# Patient Record
Sex: FEMALE | Race: WHITE | NOT HISPANIC OR LATINO | Employment: OTHER | URBAN - METROPOLITAN AREA
[De-identification: names, ages, dates, MRNs, and addresses within clinical notes are randomized per-mention and may not be internally consistent; named-entity substitution may affect disease eponyms.]

---

## 2017-01-04 ENCOUNTER — INFUSION (OUTPATIENT)
Dept: ONCOLOGY | Facility: HOSPITAL | Age: 82
End: 2017-01-04

## 2017-01-04 VITALS
SYSTOLIC BLOOD PRESSURE: 114 MMHG | WEIGHT: 144 LBS | RESPIRATION RATE: 16 BRPM | BODY MASS INDEX: 24.72 KG/M2 | DIASTOLIC BLOOD PRESSURE: 42 MMHG | HEART RATE: 79 BPM | TEMPERATURE: 98.1 F

## 2017-01-04 DIAGNOSIS — Z51.11 ENCOUNTER FOR ANTINEOPLASTIC CHEMOTHERAPY: ICD-10-CM

## 2017-01-04 PROCEDURE — 36591 DRAW BLOOD OFF VENOUS DEVICE: CPT

## 2017-01-04 PROCEDURE — G0463 HOSPITAL OUTPT CLINIC VISIT: HCPCS

## 2017-01-11 ENCOUNTER — INFUSION (OUTPATIENT)
Dept: ONCOLOGY | Facility: HOSPITAL | Age: 82
End: 2017-01-11

## 2017-01-11 VITALS
BODY MASS INDEX: 23.69 KG/M2 | SYSTOLIC BLOOD PRESSURE: 121 MMHG | WEIGHT: 138 LBS | HEART RATE: 75 BPM | TEMPERATURE: 98.9 F | DIASTOLIC BLOOD PRESSURE: 49 MMHG | RESPIRATION RATE: 16 BRPM

## 2017-01-11 DIAGNOSIS — C83.38 DIFFUSE LARGE B-CELL LYMPHOMA OF LYMPH NODES OF MULTIPLE REGIONS (HCC): ICD-10-CM

## 2017-01-11 DIAGNOSIS — Z51.11 ENCOUNTER FOR ANTINEOPLASTIC CHEMOTHERAPY: ICD-10-CM

## 2017-01-11 PROCEDURE — G0463 HOSPITAL OUTPT CLINIC VISIT: HCPCS

## 2017-01-18 ENCOUNTER — INFUSION (OUTPATIENT)
Dept: ONCOLOGY | Facility: HOSPITAL | Age: 82
End: 2017-01-18

## 2017-01-18 ENCOUNTER — OFFICE VISIT (OUTPATIENT)
Dept: ONCOLOGY | Facility: CLINIC | Age: 82
End: 2017-01-18

## 2017-01-18 VITALS
RESPIRATION RATE: 14 BRPM | WEIGHT: 137 LBS | SYSTOLIC BLOOD PRESSURE: 126 MMHG | BODY MASS INDEX: 23.39 KG/M2 | HEIGHT: 64 IN | TEMPERATURE: 98.2 F | HEART RATE: 88 BPM | DIASTOLIC BLOOD PRESSURE: 59 MMHG

## 2017-01-18 DIAGNOSIS — Z51.11 ENCOUNTER FOR ANTINEOPLASTIC CHEMOTHERAPY: Primary | ICD-10-CM

## 2017-01-18 DIAGNOSIS — Z51.11 ENCOUNTER FOR ANTINEOPLASTIC CHEMOTHERAPY: ICD-10-CM

## 2017-01-18 DIAGNOSIS — C83.38 DIFFUSE LARGE B-CELL LYMPHOMA OF LYMPH NODES OF MULTIPLE REGIONS (HCC): ICD-10-CM

## 2017-01-18 PROCEDURE — 36592 COLLECT BLOOD FROM PICC: CPT

## 2017-01-18 PROCEDURE — 99214 OFFICE O/P EST MOD 30 MIN: CPT | Performed by: INTERNAL MEDICINE

## 2017-01-18 RX ORDER — DIPHENHYDRAMINE HYDROCHLORIDE 50 MG/ML
50 INJECTION INTRAMUSCULAR; INTRAVENOUS AS NEEDED
Status: CANCELLED | OUTPATIENT
Start: 2017-02-09

## 2017-01-18 RX ORDER — PALONOSETRON 0.05 MG/ML
0.25 INJECTION, SOLUTION INTRAVENOUS ONCE
Status: CANCELLED | OUTPATIENT
Start: 2017-02-09

## 2017-01-18 RX ORDER — SODIUM CHLORIDE 9 MG/ML
250 INJECTION, SOLUTION INTRAVENOUS ONCE
Status: CANCELLED | OUTPATIENT
Start: 2017-02-09

## 2017-01-18 RX ORDER — SODIUM CHLORIDE 0.9 % (FLUSH) 0.9 %
10 SYRINGE (ML) INJECTION AS NEEDED
Status: DISCONTINUED | OUTPATIENT
Start: 2017-01-18 | End: 2017-01-18 | Stop reason: HOSPADM

## 2017-01-18 RX ORDER — MEPERIDINE HYDROCHLORIDE 50 MG/ML
25 INJECTION INTRAMUSCULAR; INTRAVENOUS; SUBCUTANEOUS
Status: CANCELLED | OUTPATIENT
Start: 2017-02-09

## 2017-01-18 RX ORDER — SODIUM CHLORIDE 0.9 % (FLUSH) 0.9 %
10 SYRINGE (ML) INJECTION AS NEEDED
Status: CANCELLED | OUTPATIENT
Start: 2017-01-18

## 2017-01-18 RX ORDER — FAMOTIDINE 10 MG/ML
20 INJECTION, SOLUTION INTRAVENOUS AS NEEDED
Status: CANCELLED | OUTPATIENT
Start: 2017-02-09

## 2017-01-18 RX ORDER — DOXORUBICIN HYDROCHLORIDE 2 MG/ML
25 INJECTION, SOLUTION INTRAVENOUS ONCE
Status: CANCELLED | OUTPATIENT
Start: 2017-02-09

## 2017-01-18 RX ORDER — ACETAMINOPHEN 325 MG/1
650 TABLET ORAL ONCE
Status: CANCELLED | OUTPATIENT
Start: 2017-02-09

## 2017-01-18 RX ADMIN — SODIUM CHLORIDE, PRESERVATIVE FREE 10 ML: 5 INJECTION INTRAVENOUS at 11:42

## 2017-01-18 NOTE — PROGRESS NOTES
"PROBLEM LIST:  1. 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 Initiated  CHOP plus Rituxan 10/27/2016  3. Ejection fraction normal prior to his initiating treatment         REASON FOR VISIT: Cycle#5    HISTORY OF PRESENT ILLNESS:   80-year-old lady with stage III diffuse large B cell lymphoma undergoing chemotherapy with CHOP plus Rituxan presents today for cycle #5 .  Doing well currently.  She has minimal nausea.  She does have fatigue midcycle.  She is otherwise is not having any significant issues of infections.  She cycles to diarrhea and constipation.  Otherwise no issues ongoing.    Past medical history, social history and family history was reviewed and unchanged from prior visit.    Review of Systems:    Review of Systems   Constitutional: Negative.    HENT:  Negative.    Eyes: Negative.    Respiratory: Negative.    Cardiovascular: Negative.    Gastrointestinal: Negative.    Endocrine: Negative.    Genitourinary: Negative.     Musculoskeletal: Negative.    Skin: Negative.    Neurological: Negative.    Hematological: Negative.    Psychiatric/Behavioral: Negative.       A comprehensive 14 point review of systems was performed and was negative except as mentioned.      Medications:  The current medication list was reviewed in the EMR    ALLERGIES:    Allergies   Allergen Reactions   • Latex Itching, Dermatitis and Rash   • Penicillins Rash         Physical Exam    VITAL SIGNS:  Visit Vitals   • /59   • Pulse 88   • Temp 98.2 °F (36.8 °C)   • Resp 14   • Ht 64\" (162.6 cm)   • Wt 137 lb (62.1 kg)   • BMI 23.52 kg/m2        Performance Status: 0    General: well appearing, in no acute distress  HEENT: sclera anicteric, oropharynx clear, neck is supple  Lymphatics: no cervical, supraclavicular, or axillary adenopathy  Cardiovascular: regular rate and rhythm, no murmurs, rubs or gallops  Lungs: clear to auscultation bilaterally  Abdomen: soft, nontender, nondistended.  No palpable organomegaly  Extremities: " no lower extremity edema  Skin: no rashes, lesions, bruising, or petechiae  Msk:  Shows no weakness of the large muscle groups  Psych: Mood is stable          Assessment/Plan    Stage III diffuse large B-cell lymphoma.  Near complete response after 2 cycles of chemotherapy.  Continue cycle #5 today with no dose reduction.  She is tolerating CHOP plus Rituxan with minimal side effects.  My plan is to complete 6 cycles after which her PICC line can be removed and plan for PET scan 1 month later.  Return to clinic in 3 weeks for last cycle of chemotherapy.    Mike Dale MD  [facility] Hematology and Oncology    1/18/2017         Please note that portions of this note may have been completed with a voice recognition program. Efforts were made to edit the dictations, but occasionally words are mistranscribed.

## 2017-01-18 NOTE — MR AVS SNAPSHOT
Katie Vaughn   1/18/2017 11:30 AM   Office Visit    Dept Phone:  [phone]   Encounter #:  [id number]    Provider:  Mike Dale MD   Department:  [facility] HEMATOLOGY  AND ONCOLOGY                Your Full Care Plan              Your Updated Medication List          This list is accurate as of: 1/18/17 11:55 AM.  Always use your most recent med list.                dicyclomine 10 MG capsule   Commonly known as:  BENTYL       diphenhydrAMINE 25 mg capsule   Commonly known as:  BENADRYL       doxycycline 100 MG tablet   Commonly known as:  VIBRAMYICN   Take 1 tablet by mouth 2 (Two) Times a Day. For 7 days       HYDROcodone-acetaminophen 5-325 MG per tablet   Commonly known as:  NORCO       levothyroxine 112 MCG tablet   Commonly known as:  SYNTHROID, LEVOTHROID       LORazepam 0.5 MG tablet   Commonly known as:  ATIVAN       metoprolol succinate XL 50 MG 24 hr tablet   Commonly known as:  TOPROL-XL       ondansetron 8 MG tablet   Commonly known as:  ZOFRAN   Take 1 tablet by mouth 3 (Three) Times a Day As Needed for nausea or vomiting.       prochlorperazine 10 MG tablet   Commonly known as:  COMPAZINE   Take 1 tablet by mouth Every 6 (Six) Hours As Needed for nausea or vomiting.       temazepam 15 MG capsule   Commonly known as:  RESTORIL   Take 1 capsule by mouth At Night As Needed for sleep. Pt may increase to 2 caps PO at night if not effective after 2 nights       vitamin B-12 1000 MCG tablet   Commonly known as:  CYANOCOBALAMIN               Instructions     None    Patient Instructions History      MyChart Signup     Our records indicate that you have declined [facility] signup. If you would like to sign up for [facility], please email [email or web address] or call [phone] to obtain an activation code.             Other Info from Your Visit           Your appointments     Date & Time Provider Appointment Department    Jan 19, 2017   "9:30 AM EST  JOSE LUIS FRKFT CHAIR 4 INFUSION [facility] OUTPATIENT ONCOLOGY AT [place]    Jan 25, 2017 11:30 AM EST  JOSE LUIS FRKFT CHAIR 3 DRESSING CHANGE - CENTRAL LINE [facility] OUTPATIENT ONCOLOGY AT [place]    Feb 01, 2017 11:30 AM EST  JOSE LUIS FRKFT CHAIR 2 DRESSING CHANGE - CENTRAL LINE [facility] OUTPATIENT ONCOLOGY AT [place]    Feb 08, 2017 11:45 AM EST Mike Dale MD FOLLOW UP [facility] HEMATOLOGY  AND ONCOLOGY    Feb 08, 2017 12:30 PM EST  JOSE LUIS FRKFT CHAIR 6 DRESSING CHANGE - CENTRAL LINE [facility] OUTPATIENT ONCOLOGY AT [place]    Feb 09, 2017  9:30 AM EST  JOSE LUIS FRKFT CHAIR 4 INFUSION [facility] OUTPATIENT ONCOLOGY AT [facility] OUTPATIENT ONCOLOGY AT [facility]  [address] [place] [id number]  [phone] [facility] HEMATOLOGY  AND ONCOLOGY  [address] [place] [id number]  [phone]          Vital Signs     Blood Pressure Pulse Temperature Respirations Height Weight    126/59 88 98.2 °F (36.8 °C) 14 64\" (162.6 cm) 137 lb (62.1 kg)    Body Mass Index Smoking Status                23.52 kg/m2 Former Smoker          Problems and Diagnoses Noted     Diffuse large B-cell lymphoma of lymph nodes of multiple regions    Patient on antineoplastic chemotherapy regimen        "

## 2017-01-19 ENCOUNTER — INFUSION (OUTPATIENT)
Dept: ONCOLOGY | Facility: HOSPITAL | Age: 82
End: 2017-01-19

## 2017-01-19 VITALS — SYSTOLIC BLOOD PRESSURE: 98 MMHG | DIASTOLIC BLOOD PRESSURE: 44 MMHG | HEART RATE: 74 BPM

## 2017-01-19 DIAGNOSIS — Z51.11 ENCOUNTER FOR ANTINEOPLASTIC CHEMOTHERAPY: Primary | ICD-10-CM

## 2017-01-19 DIAGNOSIS — C83.38 DIFFUSE LARGE B-CELL LYMPHOMA OF LYMPH NODES OF MULTIPLE REGIONS (HCC): ICD-10-CM

## 2017-01-19 PROCEDURE — 25010000002 DEXAMETHASONE PER 1 MG: Performed by: INTERNAL MEDICINE

## 2017-01-19 PROCEDURE — 25010000002 DIPHENHYDRAMINE PER 50 MG: Performed by: INTERNAL MEDICINE

## 2017-01-19 PROCEDURE — 96376 TX/PRO/DX INJ SAME DRUG ADON: CPT

## 2017-01-19 PROCEDURE — 25010000002 DOXORUBICIN PER 10 MG: Performed by: INTERNAL MEDICINE

## 2017-01-19 PROCEDURE — 96413 CHEMO IV INFUSION 1 HR: CPT

## 2017-01-19 PROCEDURE — 25010000002 VINCRISTINE PER 1 MG: Performed by: INTERNAL MEDICINE

## 2017-01-19 PROCEDURE — 96415 CHEMO IV INFUSION ADDL HR: CPT

## 2017-01-19 PROCEDURE — 96417 CHEMO IV INFUS EACH ADDL SEQ: CPT

## 2017-01-19 PROCEDURE — 25010000002 PEGFILGRASTIM 6 MG/0.6ML PREFILLED SYRINGE KIT: Performed by: INTERNAL MEDICINE

## 2017-01-19 PROCEDURE — 25010000002 PALONOSETRON PER 25 MCG: Performed by: INTERNAL MEDICINE

## 2017-01-19 PROCEDURE — 96375 TX/PRO/DX INJ NEW DRUG ADDON: CPT

## 2017-01-19 PROCEDURE — 25010000002 FOSAPREPITANT PER 1 MG: Performed by: INTERNAL MEDICINE

## 2017-01-19 PROCEDURE — 25010000002 CYCLOPHOSPHAMIDE PER 100 MG: Performed by: INTERNAL MEDICINE

## 2017-01-19 PROCEDURE — 25010000002 RITUXIMAB 10 MG/ML SOLUTION 50 ML VIAL: Performed by: INTERNAL MEDICINE

## 2017-01-19 PROCEDURE — 96411 CHEMO IV PUSH ADDL DRUG: CPT

## 2017-01-19 PROCEDURE — 96372 THER/PROPH/DIAG INJ SC/IM: CPT

## 2017-01-19 PROCEDURE — 96377 APPLICATON ON-BODY INJECTOR: CPT

## 2017-01-19 PROCEDURE — 25010000002 RITUXIMAB 10 MG/ML SOLUTION 10 ML VIAL: Performed by: INTERNAL MEDICINE

## 2017-01-19 PROCEDURE — 96367 TX/PROPH/DG ADDL SEQ IV INF: CPT

## 2017-01-19 RX ORDER — SODIUM CHLORIDE 0.9 % (FLUSH) 0.9 %
10 SYRINGE (ML) INJECTION AS NEEDED
Status: DISCONTINUED | OUTPATIENT
Start: 2017-01-19 | End: 2017-01-19 | Stop reason: HOSPADM

## 2017-01-19 RX ORDER — SODIUM CHLORIDE 0.9 % (FLUSH) 0.9 %
10 SYRINGE (ML) INJECTION AS NEEDED
Status: CANCELLED | OUTPATIENT
Start: 2017-01-19

## 2017-01-19 RX ORDER — ACETAMINOPHEN 325 MG/1
650 TABLET ORAL ONCE
Status: COMPLETED | OUTPATIENT
Start: 2017-01-19 | End: 2017-01-19

## 2017-01-19 RX ORDER — DOXORUBICIN HYDROCHLORIDE 2 MG/ML
25 INJECTION, SOLUTION INTRAVENOUS ONCE
Status: COMPLETED | OUTPATIENT
Start: 2017-01-19 | End: 2017-01-19

## 2017-01-19 RX ORDER — SODIUM CHLORIDE 9 MG/ML
250 INJECTION, SOLUTION INTRAVENOUS ONCE
Status: COMPLETED | OUTPATIENT
Start: 2017-01-19 | End: 2017-01-19

## 2017-01-19 RX ORDER — PALONOSETRON 0.05 MG/ML
0.25 INJECTION, SOLUTION INTRAVENOUS ONCE
Status: COMPLETED | OUTPATIENT
Start: 2017-01-19 | End: 2017-01-19

## 2017-01-19 RX ADMIN — PEGFILGRASTIM 6 MG: KIT SUBCUTANEOUS at 13:24

## 2017-01-19 RX ADMIN — SODIUM CHLORIDE, PRESERVATIVE FREE 10 ML: 5 INJECTION INTRAVENOUS at 13:16

## 2017-01-19 RX ADMIN — SODIUM CHLORIDE 250 ML: 9 INJECTION, SOLUTION INTRAVENOUS at 09:18

## 2017-01-19 RX ADMIN — VINCRISTINE SULFATE 1 MG: 1 INJECTION, SOLUTION INTRAVENOUS at 12:32

## 2017-01-19 RX ADMIN — RITUXIMAB 630 MG: 10 INJECTION, SOLUTION INTRAVENOUS at 10:08

## 2017-01-19 RX ADMIN — CYCLOPHOSPHAMIDE 670 MG: 1 INJECTION, POWDER, FOR SOLUTION INTRAVENOUS; ORAL at 12:47

## 2017-01-19 RX ADMIN — PALONOSETRON HYDROCHLORIDE 0.25 MG: 0.25 INJECTION INTRAVENOUS at 09:22

## 2017-01-19 RX ADMIN — DIPHENHYDRAMINE HYDROCHLORIDE 50 MG: 50 INJECTION INTRAMUSCULAR; INTRAVENOUS at 09:23

## 2017-01-19 RX ADMIN — SODIUM CHLORIDE 150 MG: 9 INJECTION, SOLUTION INTRAVENOUS at 09:45

## 2017-01-19 RX ADMIN — DOXORUBICIN HYDROCHLORIDE 42 MG: 2 INJECTION, SOLUTION INTRAVENOUS at 12:24

## 2017-01-19 RX ADMIN — DEXAMETHASONE SODIUM PHOSPHATE 12 MG: 4 INJECTION, SOLUTION INTRA-ARTICULAR; INTRALESIONAL; INTRAMUSCULAR; INTRAVENOUS; SOFT TISSUE at 09:47

## 2017-01-19 RX ADMIN — ACETAMINOPHEN 650 MG: 325 TABLET ORAL at 09:19

## 2017-01-25 ENCOUNTER — INFUSION (OUTPATIENT)
Dept: ONCOLOGY | Facility: HOSPITAL | Age: 82
End: 2017-01-25

## 2017-01-25 DIAGNOSIS — C83.38 DIFFUSE LARGE B-CELL LYMPHOMA OF LYMPH NODES OF MULTIPLE REGIONS (HCC): Primary | ICD-10-CM

## 2017-01-25 PROCEDURE — 36591 DRAW BLOOD OFF VENOUS DEVICE: CPT

## 2017-01-25 PROCEDURE — 25010000002 HEPARIN FLUSH (PORCINE) 100 UNIT/ML SOLUTION: Performed by: INTERNAL MEDICINE

## 2017-01-25 PROCEDURE — G0463 HOSPITAL OUTPT CLINIC VISIT: HCPCS

## 2017-01-25 RX ORDER — SODIUM CHLORIDE 0.9 % (FLUSH) 0.9 %
10 SYRINGE (ML) INJECTION AS NEEDED
Status: CANCELLED | OUTPATIENT
Start: 2017-01-25

## 2017-01-25 RX ADMIN — HEPARIN 500 UNITS: 100 SYRINGE at 11:56

## 2017-02-01 ENCOUNTER — INFUSION (OUTPATIENT)
Dept: ONCOLOGY | Facility: HOSPITAL | Age: 82
End: 2017-02-01

## 2017-02-01 VITALS
WEIGHT: 137.2 LBS | BODY MASS INDEX: 23.55 KG/M2 | SYSTOLIC BLOOD PRESSURE: 123 MMHG | DIASTOLIC BLOOD PRESSURE: 50 MMHG | HEART RATE: 87 BPM | TEMPERATURE: 98.6 F

## 2017-02-01 DIAGNOSIS — Z51.11 ENCOUNTER FOR ANTINEOPLASTIC CHEMOTHERAPY: ICD-10-CM

## 2017-02-01 DIAGNOSIS — C83.38 DIFFUSE LARGE B-CELL LYMPHOMA OF LYMPH NODES OF MULTIPLE REGIONS (HCC): Primary | ICD-10-CM

## 2017-02-01 PROCEDURE — 25010000002 HEPARIN FLUSH (PORCINE) 100 UNIT/ML SOLUTION: Performed by: INTERNAL MEDICINE

## 2017-02-01 PROCEDURE — 96523 IRRIG DRUG DELIVERY DEVICE: CPT

## 2017-02-01 PROCEDURE — G0463 HOSPITAL OUTPT CLINIC VISIT: HCPCS

## 2017-02-01 RX ORDER — SODIUM CHLORIDE 0.9 % (FLUSH) 0.9 %
10 SYRINGE (ML) INJECTION AS NEEDED
Status: CANCELLED | OUTPATIENT
Start: 2017-02-01

## 2017-02-01 RX ORDER — SODIUM CHLORIDE 0.9 % (FLUSH) 0.9 %
10 SYRINGE (ML) INJECTION AS NEEDED
Status: DISCONTINUED | OUTPATIENT
Start: 2017-02-01 | End: 2017-02-01 | Stop reason: HOSPADM

## 2017-02-01 RX ADMIN — HEPARIN 500 UNITS: 100 SYRINGE at 11:40

## 2017-02-01 RX ADMIN — Medication 10 ML: at 11:40

## 2017-02-08 ENCOUNTER — INFUSION (OUTPATIENT)
Dept: ONCOLOGY | Facility: HOSPITAL | Age: 82
End: 2017-02-08

## 2017-02-08 ENCOUNTER — OFFICE VISIT (OUTPATIENT)
Dept: ONCOLOGY | Facility: CLINIC | Age: 82
End: 2017-02-08

## 2017-02-08 VITALS
SYSTOLIC BLOOD PRESSURE: 120 MMHG | RESPIRATION RATE: 16 BRPM | WEIGHT: 136 LBS | HEIGHT: 64 IN | HEART RATE: 79 BPM | TEMPERATURE: 98.4 F | DIASTOLIC BLOOD PRESSURE: 63 MMHG | BODY MASS INDEX: 23.22 KG/M2

## 2017-02-08 DIAGNOSIS — Z51.11 ENCOUNTER FOR ANTINEOPLASTIC CHEMOTHERAPY: ICD-10-CM

## 2017-02-08 DIAGNOSIS — C83.38 DIFFUSE LARGE B-CELL LYMPHOMA OF LYMPH NODES OF MULTIPLE REGIONS (HCC): Primary | ICD-10-CM

## 2017-02-08 PROCEDURE — 99214 OFFICE O/P EST MOD 30 MIN: CPT | Performed by: INTERNAL MEDICINE

## 2017-02-08 PROCEDURE — 36591 DRAW BLOOD OFF VENOUS DEVICE: CPT

## 2017-02-08 PROCEDURE — 25010000002 HEPARIN FLUSH (PORCINE) 100 UNIT/ML SOLUTION: Performed by: INTERNAL MEDICINE

## 2017-02-08 PROCEDURE — 36592 COLLECT BLOOD FROM PICC: CPT

## 2017-02-08 RX ORDER — SODIUM CHLORIDE 0.9 % (FLUSH) 0.9 %
10 SYRINGE (ML) INJECTION AS NEEDED
OUTPATIENT
Start: 2017-02-08

## 2017-02-08 RX ORDER — SODIUM CHLORIDE 0.9 % (FLUSH) 0.9 %
10 SYRINGE (ML) INJECTION AS NEEDED
Status: DISCONTINUED | OUTPATIENT
Start: 2017-02-08 | End: 2017-02-08 | Stop reason: HOSPADM

## 2017-02-08 RX ORDER — HEPARIN SODIUM (PORCINE) LOCK FLUSH IV SOLN 100 UNIT/ML 100 UNIT/ML
500 SOLUTION INTRAVENOUS AS NEEDED
OUTPATIENT
Start: 2017-02-08

## 2017-02-08 RX ADMIN — Medication 10 ML: at 12:00

## 2017-02-08 RX ADMIN — HEPARIN 500 UNITS: 100 SYRINGE at 12:00

## 2017-02-08 NOTE — PROGRESS NOTES
"PROBLEM LIST:  1. 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 Initiated  CHOP plus Rituxan 10/27/2016  3. Ejection fraction normal prior to his initiating treatment  4. Completed 6 cycles of CHOP-R on 2/9/17         REASON FOR VISIT: Cycle#5    HISTORY OF PRESENT ILLNESS:   80-year-old lady with stage III diffuse large B cell lymphoma undergoing chemotherapy with CHOP plus Rituxan presents today for cycle #6 .  Doing well currently.  She has minimal nausea.   She is otherwise is not having any significant issues of infections.    Otherwise no issues ongoing.    Past medical history, social history and family history was reviewed and unchanged from prior visit.    Review of Systems:    Review of Systems   Constitutional: Negative.    HENT:  Negative.    Eyes: Negative.    Respiratory: Negative.    Cardiovascular: Negative.    Gastrointestinal: Negative.    Endocrine: Negative.    Genitourinary: Negative.     Musculoskeletal: Negative.    Skin: Negative.    Neurological: Negative.    Hematological: Negative.    Psychiatric/Behavioral: Negative.       A comprehensive 14 point review of systems was performed and was negative except as mentioned.      Medications:  The current medication list was reviewed in the EMR    ALLERGIES:    Allergies   Allergen Reactions   • Latex Itching, Dermatitis and Rash   • Penicillins Rash         Physical Exam    VITAL SIGNS:  Visit Vitals   • /63   • Pulse 79   • Temp 98.4 °F (36.9 °C)   • Resp 16   • Ht 64\" (162.6 cm)   • Wt 136 lb (61.7 kg)   • BMI 23.34 kg/m2        Performance Status: 0    General: well appearing, in no acute distress  HEENT: sclera anicteric, oropharynx clear, neck is supple  Lymphatics: no cervical, supraclavicular, or axillary adenopathy  Cardiovascular: regular rate and rhythm, no murmurs, rubs or gallops  Lungs: clear to auscultation bilaterally  Abdomen: soft, nontender, nondistended.  No palpable organomegaly  Extremities: no lower extremity " edema  Skin: no rashes, lesions, bruising, or petechiae  Msk:  Shows no weakness of the large muscle groups  Psych: Mood is stable          Assessment/Plan    Stage III diffuse large B-cell lymphoma.  Near complete response after 2 cycles of chemotherapy.  Continue cycle #6 today with no dose reduction.  This will be her last cycle of chemotherapy.  I will have her PICC line removed after this treatment.  Plan to PET scan her in 1 month to see disease response.  Due to her age she would not be a candidate for salvage autologous stem cell transplant if necessary.  So I'm hopeful that this treatment has cured her.  We'll see her back in my clinic in 1 month and prior to that PET scan.      Mike Dale MD  [facility] Hematology and Oncology    2/8/2017         Please note that portions of this note may have been completed with a voice recognition program. Efforts were made to edit the dictations, but occasionally words are mistranscribed.

## 2017-02-09 ENCOUNTER — INFUSION (OUTPATIENT)
Dept: ONCOLOGY | Facility: HOSPITAL | Age: 82
End: 2017-02-09

## 2017-02-09 VITALS
WEIGHT: 136.8 LBS | BODY MASS INDEX: 23.48 KG/M2 | DIASTOLIC BLOOD PRESSURE: 46 MMHG | TEMPERATURE: 98.6 F | HEART RATE: 75 BPM | RESPIRATION RATE: 14 BRPM | SYSTOLIC BLOOD PRESSURE: 100 MMHG

## 2017-02-09 DIAGNOSIS — Z51.11 ENCOUNTER FOR ANTINEOPLASTIC CHEMOTHERAPY: Primary | ICD-10-CM

## 2017-02-09 DIAGNOSIS — C83.38 DIFFUSE LARGE B-CELL LYMPHOMA OF LYMPH NODES OF MULTIPLE REGIONS (HCC): ICD-10-CM

## 2017-02-09 PROCEDURE — 25010000002 RITUXIMAB 10 MG/ML SOLUTION 50 ML VIAL: Performed by: INTERNAL MEDICINE

## 2017-02-09 PROCEDURE — 96417 CHEMO IV INFUS EACH ADDL SEQ: CPT

## 2017-02-09 PROCEDURE — 96413 CHEMO IV INFUSION 1 HR: CPT

## 2017-02-09 PROCEDURE — 25010000002 PEGFILGRASTIM 6 MG/0.6ML PREFILLED SYRINGE KIT: Performed by: INTERNAL MEDICINE

## 2017-02-09 PROCEDURE — 25010000002 VINCRISTINE PER 1 MG: Performed by: INTERNAL MEDICINE

## 2017-02-09 PROCEDURE — 25010000002 DEXAMETHASONE PER 1 MG: Performed by: INTERNAL MEDICINE

## 2017-02-09 PROCEDURE — 96377 APPLICATON ON-BODY INJECTOR: CPT

## 2017-02-09 PROCEDURE — 25010000002 CYCLOPHOSPHAMIDE PER 100 MG: Performed by: INTERNAL MEDICINE

## 2017-02-09 PROCEDURE — 25010000002 RITUXIMAB 10 MG/ML SOLUTION 10 ML VIAL: Performed by: INTERNAL MEDICINE

## 2017-02-09 PROCEDURE — 25010000002 PALONOSETRON PER 25 MCG: Performed by: INTERNAL MEDICINE

## 2017-02-09 PROCEDURE — 96415 CHEMO IV INFUSION ADDL HR: CPT

## 2017-02-09 PROCEDURE — 96375 TX/PRO/DX INJ NEW DRUG ADDON: CPT

## 2017-02-09 PROCEDURE — 25010000002 DOXORUBICIN PER 10 MG: Performed by: INTERNAL MEDICINE

## 2017-02-09 PROCEDURE — 25010000002 FOSAPREPITANT PER 1 MG: Performed by: INTERNAL MEDICINE

## 2017-02-09 PROCEDURE — 96367 TX/PROPH/DG ADDL SEQ IV INF: CPT

## 2017-02-09 PROCEDURE — 25010000002 DIPHENHYDRAMINE PER 50 MG: Performed by: INTERNAL MEDICINE

## 2017-02-09 PROCEDURE — 96411 CHEMO IV PUSH ADDL DRUG: CPT

## 2017-02-09 RX ORDER — SODIUM CHLORIDE 9 MG/ML
250 INJECTION, SOLUTION INTRAVENOUS ONCE
Status: COMPLETED | OUTPATIENT
Start: 2017-02-09 | End: 2017-02-09

## 2017-02-09 RX ORDER — PALONOSETRON 0.05 MG/ML
0.25 INJECTION, SOLUTION INTRAVENOUS ONCE
Status: COMPLETED | OUTPATIENT
Start: 2017-02-09 | End: 2017-02-09

## 2017-02-09 RX ORDER — DOXORUBICIN HYDROCHLORIDE 2 MG/ML
25 INJECTION, SOLUTION INTRAVENOUS ONCE
Status: COMPLETED | OUTPATIENT
Start: 2017-02-09 | End: 2017-02-09

## 2017-02-09 RX ORDER — ACETAMINOPHEN 325 MG/1
650 TABLET ORAL ONCE
Status: COMPLETED | OUTPATIENT
Start: 2017-02-09 | End: 2017-02-09

## 2017-02-09 RX ADMIN — DIPHENHYDRAMINE HYDROCHLORIDE 50 MG: 50 INJECTION INTRAMUSCULAR; INTRAVENOUS at 09:37

## 2017-02-09 RX ADMIN — ACETAMINOPHEN 650 MG: 325 TABLET ORAL at 09:32

## 2017-02-09 RX ADMIN — DEXAMETHASONE SODIUM PHOSPHATE 12 MG: 4 INJECTION, SOLUTION INTRA-ARTICULAR; INTRALESIONAL; INTRAMUSCULAR; INTRAVENOUS; SOFT TISSUE at 09:53

## 2017-02-09 RX ADMIN — SODIUM CHLORIDE 150 MG: 9 INJECTION, SOLUTION INTRAVENOUS at 09:53

## 2017-02-09 RX ADMIN — PALONOSETRON HYDROCHLORIDE 0.25 MG: 0.25 INJECTION INTRAVENOUS at 09:34

## 2017-02-09 RX ADMIN — PEGFILGRASTIM 6 MG: KIT SUBCUTANEOUS at 13:45

## 2017-02-09 RX ADMIN — VINCRISTINE SULFATE 1 MG: 1 INJECTION, SOLUTION INTRAVENOUS at 12:48

## 2017-02-09 RX ADMIN — RITUXIMAB 630 MG: 10 INJECTION, SOLUTION INTRAVENOUS at 10:15

## 2017-02-09 RX ADMIN — CYCLOPHOSPHAMIDE 670 MG: 1 INJECTION, POWDER, FOR SOLUTION INTRAVENOUS; ORAL at 13:01

## 2017-02-09 RX ADMIN — DOXORUBICIN HYDROCHLORIDE 42 MG: 2 INJECTION, SOLUTION INTRAVENOUS at 12:42

## 2017-02-09 RX ADMIN — SODIUM CHLORIDE 250 ML: 9 INJECTION, SOLUTION INTRAVENOUS at 09:31

## 2017-02-17 ENCOUNTER — TELEPHONE (OUTPATIENT)
Dept: ONCOLOGY | Facility: CLINIC | Age: 82
End: 2017-02-17

## 2017-02-17 DIAGNOSIS — C83.38 DIFFUSE LARGE B-CELL LYMPHOMA OF LYMPH NODES OF MULTIPLE REGIONS (HCC): Primary | ICD-10-CM

## 2017-02-17 DIAGNOSIS — Z51.11 ENCOUNTER FOR ANTINEOPLASTIC CHEMOTHERAPY: ICD-10-CM

## 2017-02-17 RX ORDER — FLUCONAZOLE 200 MG/1
200 TABLET ORAL DAILY
Qty: 3 TABLET | Refills: 1 | Status: SHIPPED | OUTPATIENT
Start: 2017-02-17 | End: 2019-04-22 | Stop reason: HOSPADM

## 2017-02-17 NOTE — TELEPHONE ENCOUNTER
----- Message from Alix Salvador sent at 2/17/2017 10:50 AM EST -----  Regarding: kumar/ rash pelvic area  Contact: [phone]  Rash in pelvic area.  Had last chemo.  Please call to discuss.

## 2017-02-20 ENCOUNTER — TELEPHONE (OUTPATIENT)
Dept: ONCOLOGY | Facility: CLINIC | Age: 82
End: 2017-02-20

## 2017-02-20 DIAGNOSIS — Z51.11 ENCOUNTER FOR ANTINEOPLASTIC CHEMOTHERAPY: ICD-10-CM

## 2017-02-20 DIAGNOSIS — C83.38 DIFFUSE LARGE B-CELL LYMPHOMA OF LYMPH NODES OF MULTIPLE REGIONS (HCC): Primary | ICD-10-CM

## 2017-02-20 RX ORDER — NYSTATIN 100000 [USP'U]/G
POWDER TOPICAL 3 TIMES DAILY
Qty: 30 G | Refills: 3 | Status: SHIPPED | OUTPATIENT
Start: 2017-02-20 | End: 2019-04-22 | Stop reason: HOSPADM

## 2017-02-20 NOTE — TELEPHONE ENCOUNTER
----- Message from Fiorella Milton sent at 2/20/2017  9:04 AM EST -----  Regarding: EMANUEL-RASH  Contact: [phone]  Patient still has the rash after finishing medication and it is still spreading please advise.

## 2017-02-21 ENCOUNTER — TELEPHONE (OUTPATIENT)
Dept: NUTRITION | Facility: HOSPITAL | Age: 82
End: 2017-02-21

## 2017-02-21 NOTE — PROGRESS NOTES
Oncology Nutrition Screening    Patient Name:  Katie Vaughn  YOB: 1935  MRN: [id number]  Date:  02/21/17  Physician:  Dr. Pal    Type of Cancer Treatment:   Chemotherapy:  R-CHOP 6/6 cycles completed 2/8/17    Patient Active Problem List   Diagnosis   • Diffuse large B-cell lymphoma of lymph nodes of multiple regions   • Encounter for antineoplastic chemotherapy       Current Outpatient Prescriptions   Medication Sig Dispense Refill   • dicyclomine (BENTYL) 10 MG capsule Take 10 mg by mouth As Needed.     • diphenhydrAMINE (BENADRYL) 25 mg capsule Take 25 mg by mouth Every 6 (Six) Hours As Needed for itching.     • doxycycline (VIBRAMYICN) 100 MG tablet Take 1 tablet by mouth 2 (Two) Times a Day. For 7 days 7 tablet 0   • fluconazole (DIFLUCAN) 200 MG tablet Take 1 tablet by mouth Daily. For 3 days 3 tablet 1   • HYDROcodone-acetaminophen (NORCO) 5-325 MG per tablet      • levothyroxine (SYNTHROID, LEVOTHROID) 112 MCG tablet Take 112 mcg by mouth Daily.     • LORazepam (ATIVAN) 0.5 MG tablet Take 0.5 mg by mouth Daily.     • metoprolol succinate XL (TOPROL-XL) 50 MG 24 hr tablet      • nystatin (MYCOSTATIN) 309206 UNIT/GM powder Apply  topically 3 (Three) Times a Day. 30 g 3   • ondansetron (ZOFRAN) 8 MG tablet Take 1 tablet by mouth 3 (Three) Times a Day As Needed for nausea or vomiting. 30 tablet 5   • prochlorperazine (COMPAZINE) 10 MG tablet Take 1 tablet by mouth Every 6 (Six) Hours As Needed for nausea or vomiting. 90 tablet 5   • temazepam (RESTORIL) 15 MG capsule Take 1 capsule by mouth At Night As Needed for sleep. Pt may increase to 2 caps PO at night if not effective after 2 nights 60 capsule 5   • vitamin B-12 (CYANOCOBALAMIN) 1000 MCG tablet Inject 1,000 mcg as directed Every 30 (Thirty) Days.       No current facility-administered medications for this visit.        Glycemic Risk:   Steroids with treatment     Weight:   Height: 64 inches  Weight: 136 lbs.  Usual Body Weight: ~141 lbs.    BMI: 23.3  Normal  Weight -no significant changes    Oral Food Intake:  Regular Diet - No Restrictions    Hydration Status:   How many 8 ounce glass of water of fluid do you drink per day?  Not assessed    Enteral Feeding:   n/a    Nutrition Symptoms:   Nausea  Fatigue    Activity:   Normal with no limitations     reports that she has quit smoking. She does not have any smokeless tobacco history on file.    Evaluation of Nutritional Risk:   Patient has been identified at mild nutritional risk due to diagnosis and treatment plan.  Spoke with patient via phone call for nutritional assessment.  She reports tolerating her chemotherapy well with her only nutritional complaint being mild nausea.  She does state that her appetite has been slightly decreased but she ate throughout treatment because she knew she needed the nourishment.    She denies nutritional questions at this time and denied the need for written diet materials for cancer survivors.  Encouraged her to continue with her good oral intake.  Also encouraged her to call RD if nutritional questions or concerns arise; she stated she would.  Will follow up as needed.  RD available to assist prn.  Thanks,     Electronically signed by:  Aleisha Dodd RD  1:19 PM

## 2017-03-02 ENCOUNTER — TELEPHONE (OUTPATIENT)
Dept: ONCOLOGY | Facility: CLINIC | Age: 82
End: 2017-03-02

## 2017-03-02 DIAGNOSIS — C83.38 DIFFUSE LARGE B-CELL LYMPHOMA OF LYMPH NODES OF MULTIPLE REGIONS (HCC): Primary | ICD-10-CM

## 2017-03-02 NOTE — TELEPHONE ENCOUNTER
Patient called and reported that she had been to see her PCP, and was given simethacone for gas but hasn't taken medication yet, and feels that her abdomen swelling was related to gas.  Educated to take medication for gas and if no relief to call back on Monday if needed can try to move up PET scan earlier.  Pt was concerned about moving PET because her Son was teaching a class next week and had taken off on Thursday and didn't know that he could take off another day and he was the only one the was able to drive her to appointment.  Pt to call back on Monday is abd swelling continues and would like to move PET up sooner.

## 2017-03-09 ENCOUNTER — HOSPITAL ENCOUNTER (OUTPATIENT)
Dept: PET IMAGING | Facility: HOSPITAL | Age: 82
Discharge: HOME | End: 2017-03-09
Attending: INTERNAL MEDICINE

## 2017-03-09 ENCOUNTER — OFFICE VISIT (OUTPATIENT)
Dept: ONCOLOGY | Facility: CLINIC | Age: 82
End: 2017-03-09

## 2017-03-09 ENCOUNTER — HOSPITAL ENCOUNTER (OUTPATIENT)
Dept: PET IMAGING | Facility: HOSPITAL | Age: 82
Discharge: HOME | End: 2017-03-09
Attending: INTERNAL MEDICINE | Admitting: INTERNAL MEDICINE

## 2017-03-09 VITALS
SYSTOLIC BLOOD PRESSURE: 146 MMHG | HEART RATE: 102 BPM | DIASTOLIC BLOOD PRESSURE: 72 MMHG | TEMPERATURE: 98.1 F | WEIGHT: 134 LBS | RESPIRATION RATE: 16 BRPM | BODY MASS INDEX: 22.88 KG/M2 | HEIGHT: 64 IN

## 2017-03-09 DIAGNOSIS — Z51.11 ENCOUNTER FOR ANTINEOPLASTIC CHEMOTHERAPY: ICD-10-CM

## 2017-03-09 DIAGNOSIS — C83.38 DIFFUSE LARGE B-CELL LYMPHOMA OF LYMPH NODES OF MULTIPLE REGIONS (HCC): Primary | ICD-10-CM

## 2017-03-09 DIAGNOSIS — C83.38 DIFFUSE LARGE B-CELL LYMPHOMA OF LYMPH NODES OF MULTIPLE REGIONS (HCC): ICD-10-CM

## 2017-03-09 LAB — GLUCOSE BLDC GLUCOMTR-MCNC: 87 MG/DL (ref 70–130)

## 2017-03-09 PROCEDURE — 78815 PET IMAGE W/CT SKULL-THIGH: CPT

## 2017-03-09 PROCEDURE — 82962 GLUCOSE BLOOD TEST: CPT

## 2017-03-09 PROCEDURE — A9552 F18 FDG: HCPCS | Performed by: INTERNAL MEDICINE

## 2017-03-09 PROCEDURE — 0 FLUDEOXYGLUCOSE F18 SOLUTION: Performed by: INTERNAL MEDICINE

## 2017-03-09 PROCEDURE — 99214 OFFICE O/P EST MOD 30 MIN: CPT | Performed by: INTERNAL MEDICINE

## 2017-03-09 RX ADMIN — FLUDEOXYGLUCOSE F18 1 DOSE: 300 INJECTION INTRAVENOUS at 10:35

## 2017-03-09 NOTE — PROGRESS NOTES
"PROBLEM LIST:  1. 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 Initiated  CHOP plus Rituxan 10/27/2016  3. Ejection fraction normal prior to his initiating treatment  4. Completed 6 cycles of CHOP-R on 2/9/17         REASON FOR VISIT: Lymphoma    HISTORY OF PRESENT ILLNESS:   80-year-old lady with stage III diffuse large B cell lymphoma recently completed 6 cycles of CHOP plus Rituxan.  Her sixth cycle was complicated by a episode of zoster.  Otherwise doing reasonably well.  She underwent a PET scan to see if she has any recurrent disease.  Clinically otherwise doing well.        Past medical history, social history and family history was reviewed and unchanged from prior visit.    Review of Systems:    Review of Systems   Constitutional: Negative.    HENT:  Negative.    Eyes: Negative.    Respiratory: Negative.    Cardiovascular: Negative.    Gastrointestinal: Negative.    Endocrine: Negative.    Genitourinary: Negative.     Musculoskeletal: Negative.    Skin: Negative.    Neurological: Negative.    Hematological: Negative.    Psychiatric/Behavioral: Negative.       A comprehensive 14 point review of systems was performed and was negative except as mentioned.      Medications:  The current medication list was reviewed in the EMR    ALLERGIES:    Allergies   Allergen Reactions   • Latex Itching, Dermatitis and Rash   • Betadine [Povidone Iodine] Itching     Pt says her skin also blistered   • Penicillins Rash         Physical Exam    VITAL SIGNS:  Visit Vitals   • /72   • Pulse 102   • Temp 98.1 °F (36.7 °C) (Temporal Artery )   • Resp 16   • Ht 64\" (162.6 cm)   • Wt 134 lb (60.8 kg)   • BMI 23 kg/m2        Performance Status: 0    General: well appearing, in no acute distress  HEENT: sclera anicteric, oropharynx clear, neck is supple  Lymphatics: no cervical, supraclavicular, or axillary adenopathy  Cardiovascular: regular rate and rhythm, no murmurs, rubs or gallops  Lungs: clear to auscultation " bilaterally  Abdomen: soft, nontender, nondistended.  No palpable organomegaly  Extremities: no lower extremity edema  Skin: no rashes, lesions, bruising, or petechiae  Msk:  Shows no weakness of the large muscle groups  Psych: Mood is stable          Assessment/Plan    Stage III diffuse large B-cell lymphoma.  I reviewed her PET scan from today which shows complete resolution of her disease.  Clinically otherwise doing well.  My plan is to do only observation at this point.  I can see her back in my clinic in 6 months prior to which we'll do a repeat CAT scans.  I reviewed the imaging with her and her family today and answered all her questions that she had for me today.        Mike Dale MD  [facility] Hematology and Oncology    3/9/2017         Please note that portions of this note may have been completed with a voice recognition program. Efforts were made to edit the dictations, but occasionally words are mistranscribed.

## 2017-03-21 DIAGNOSIS — C83.38 DIFFUSE LARGE B-CELL LYMPHOMA OF LYMPH NODES OF MULTIPLE REGIONS (HCC): Primary | ICD-10-CM

## 2017-03-21 RX ORDER — GABAPENTIN 300 MG/1
300 CAPSULE ORAL 3 TIMES DAILY
Qty: 90 CAPSULE | Refills: 3 | Status: SHIPPED | OUTPATIENT
Start: 2017-03-21 | End: 2019-04-22 | Stop reason: HOSPADM

## 2017-03-21 NOTE — PROGRESS NOTES
Patient called and reported that she continues with burning and tingling in area of shingles.  Dr Demarco woods order for Gabapentin 300 mg TID, Rx sent to pharmacy. Patient educated.

## 2017-09-18 ENCOUNTER — HOSPITAL ENCOUNTER (OUTPATIENT)
Dept: CT IMAGING | Facility: HOSPITAL | Age: 82
Discharge: HOME | End: 2017-09-18
Attending: INTERNAL MEDICINE | Admitting: INTERNAL MEDICINE

## 2017-09-18 ENCOUNTER — OFFICE VISIT (OUTPATIENT)
Dept: ONCOLOGY | Facility: CLINIC | Age: 82
End: 2017-09-18

## 2017-09-18 VITALS
RESPIRATION RATE: 16 BRPM | WEIGHT: 127.9 LBS | SYSTOLIC BLOOD PRESSURE: 134 MMHG | DIASTOLIC BLOOD PRESSURE: 64 MMHG | HEIGHT: 64 IN | TEMPERATURE: 97.8 F | BODY MASS INDEX: 21.83 KG/M2 | HEART RATE: 67 BPM

## 2017-09-18 DIAGNOSIS — C83.38 DIFFUSE LARGE B-CELL LYMPHOMA OF LYMPH NODES OF MULTIPLE REGIONS (HCC): ICD-10-CM

## 2017-09-18 DIAGNOSIS — C83.38 DIFFUSE LARGE B-CELL LYMPHOMA OF LYMPH NODES OF MULTIPLE REGIONS (HCC): Primary | ICD-10-CM

## 2017-09-18 PROCEDURE — 74177 CT ABD & PELVIS W/CONTRAST: CPT

## 2017-09-18 PROCEDURE — 71260 CT THORAX DX C+: CPT

## 2017-09-18 PROCEDURE — A9270 NON-COVERED ITEM OR SERVICE: HCPCS | Performed by: INTERNAL MEDICINE

## 2017-09-18 PROCEDURE — 82565 ASSAY OF CREATININE: CPT

## 2017-09-18 PROCEDURE — 63710000001 BARIUM 2 % SUSPENSION: Performed by: INTERNAL MEDICINE

## 2017-09-18 PROCEDURE — 99213 OFFICE O/P EST LOW 20 MIN: CPT | Performed by: INTERNAL MEDICINE

## 2017-09-18 PROCEDURE — 70491 CT SOFT TISSUE NECK W/DYE: CPT

## 2017-09-18 PROCEDURE — 0 IOPAMIDOL 61 % SOLUTION: Performed by: INTERNAL MEDICINE

## 2017-09-18 RX ADMIN — IOPAMIDOL 95 ML: 612 INJECTION, SOLUTION INTRAVENOUS at 12:55

## 2017-09-18 RX ADMIN — BARIUM SULFATE 450 ML: 21 SUSPENSION ORAL at 12:00

## 2017-09-22 LAB — CREAT BLDA-MCNC: 0.9 MG/DL (ref 0.6–1.3)

## 2018-03-19 ENCOUNTER — OFFICE VISIT (OUTPATIENT)
Dept: ONCOLOGY | Facility: CLINIC | Age: 83
End: 2018-03-19

## 2018-03-19 ENCOUNTER — HOSPITAL ENCOUNTER (OUTPATIENT)
Dept: CT IMAGING | Facility: HOSPITAL | Age: 83
Discharge: HOME | End: 2018-03-19
Attending: INTERNAL MEDICINE | Admitting: INTERNAL MEDICINE

## 2018-03-19 VITALS
TEMPERATURE: 97.5 F | WEIGHT: 125 LBS | SYSTOLIC BLOOD PRESSURE: 134 MMHG | HEART RATE: 76 BPM | DIASTOLIC BLOOD PRESSURE: 64 MMHG | RESPIRATION RATE: 16 BRPM | HEIGHT: 64 IN | BODY MASS INDEX: 21.34 KG/M2

## 2018-03-19 DIAGNOSIS — C83.38 DIFFUSE LARGE B-CELL LYMPHOMA OF LYMPH NODES OF MULTIPLE REGIONS (HCC): ICD-10-CM

## 2018-03-19 DIAGNOSIS — C83.38 DIFFUSE LARGE B-CELL LYMPHOMA OF LYMPH NODES OF MULTIPLE REGIONS (HCC): Primary | ICD-10-CM

## 2018-03-19 PROCEDURE — 71260 CT THORAX DX C+: CPT

## 2018-03-19 PROCEDURE — 82565 ASSAY OF CREATININE: CPT

## 2018-03-19 PROCEDURE — 74177 CT ABD & PELVIS W/CONTRAST: CPT

## 2018-03-19 PROCEDURE — 99214 OFFICE O/P EST MOD 30 MIN: CPT | Performed by: INTERNAL MEDICINE

## 2018-03-19 PROCEDURE — 25010000002 IOPAMIDOL 61 % SOLUTION: Performed by: INTERNAL MEDICINE

## 2018-03-19 PROCEDURE — A9270 NON-COVERED ITEM OR SERVICE: HCPCS | Performed by: INTERNAL MEDICINE

## 2018-03-19 PROCEDURE — 63710000001 BARIUM 2 % SUSPENSION: Performed by: INTERNAL MEDICINE

## 2018-03-19 RX ADMIN — BARIUM SULFATE 450 ML: 21 SUSPENSION ORAL at 11:30

## 2018-03-19 RX ADMIN — IOPAMIDOL 95 ML: 612 INJECTION, SOLUTION INTRAVENOUS at 13:41

## 2018-03-20 LAB — CREAT BLDA-MCNC: 1 MG/DL (ref 0.6–1.3)

## 2018-03-20 NOTE — PROGRESS NOTES
"PROBLEM LIST:  1. 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 Initiated  CHOP plus Rituxan 10/27/2016  3. Ejection fraction normal prior to his initiating treatment  4. Completed 6 cycles of CHOP-R on 2/9/17         REASON FOR VISIT: Lymphoma    HISTORY OF PRESENT ILLNESS:   82-year-old lady with stage III diffuse large B cell lymphoma completed 6 cycles of CHOP plus Rituxan.  She did remarkably well from her treatment.  Clinically otherwise doing well.  No night sweats or fevers no abdominal discomfort.  Presents today after CAT scans.      Past medical history, social history and family history was reviewed and unchanged from prior visit.    Review of Systems:    Review of Systems   Constitutional: Negative.    HENT:  Negative.    Eyes: Negative.    Respiratory: Negative.    Cardiovascular: Negative.    Gastrointestinal: Negative.    Endocrine: Negative.    Genitourinary: Negative.     Musculoskeletal: Negative.    Skin: Negative.    Neurological: Negative.    Hematological: Negative.    Psychiatric/Behavioral: Negative.       A comprehensive 14 point review of systems was performed and was negative except as mentioned.      Medications:  The current medication list was reviewed in the EMR    ALLERGIES:    Allergies   Allergen Reactions   • Latex Itching, Dermatitis and Rash   • Betadine [Povidone Iodine] Itching     Pt says her skin also blistered   • Penicillins Rash         Physical Exam    VITAL SIGNS:  /64 Comment: LUE  Pulse 76   Temp 97.5 °F (36.4 °C) (Oral)   Resp 16   Ht 162.6 cm (64\")   Wt 56.7 kg (125 lb)   BMI 21.46 kg/m²      Performance Status: 0    General: well appearing, in no acute distress  HEENT: sclera anicteric, oropharynx clear, neck is supple  Lymphatics: no cervical, supraclavicular, or axillary adenopathy  Cardiovascular: regular rate and rhythm, no murmurs, rubs or gallops  Lungs: clear to auscultation bilaterally  Abdomen: soft, nontender, nondistended.  No palpable " organomegaly  Extremities: no lower extremity edema  Skin: no rashes, lesions, bruising, or petechiae  Msk:  Shows no weakness of the large muscle groups  Psych: Mood is stable      EXAMINATION: CT CHEST W CONTRAST-, CT ABDOMEN AND PELVIS W CONTRAST-      INDICATION: C83.38-Diffuse large B-cell lymphoma, lymph nodes of  multiple sites; followup.     TECHNIQUE: Multiple axial CT imaging was obtained of the chest, abdomen  and pelvis following the administration of oral and intravenous  contrast.     The radiation dose reduction device was turned on for each scan per the  ALARA (As Low as Reasonably Achievable) protocol.     COMPARISON: 09/18/2017.     FINDINGS: CHEST: Thyroid is homogeneous in appearance. There is wall  thickening identified in the esophagus in which upper endoscopy is  recommended for further evaluation. This overall has not significantly  changed when compared to the prior study. Cardiac chambers are within  normal limits.  No pericardial effusion. No bulky hilar or axillary  lymphadenopathy. No abnormal mass or fluid collection is identified.  There is no parenchymal consolidation, no pulmonary mass or nodule. No  pleural effusion. Some mild nodular density is seen within the right  middle lobe which are stable and unchanged when compared to the prior  study. Continued followup is recommended. There is also some stable mild  bronchiectatic changes centrally extending to the mid and lower lung  fields bilaterally. No pleural effusion or pneumothorax. Degenerative  change is seen within the spine.     Degenerative changes seen within the spine.     ABDOMEN: Liver is homogeneous. Gallbladder has been surgically removed.  The spleen is unremarkable. Kidneys and adrenal glands are within normal  limits. No abdominal or retroperitoneal lymphadenopathy. No free fluid  or free air. No abnormal mass or fluid collection is identified.   Pancreas is homogeneous.     PELVIS: The pelvic organs are  unremarkable. The pelvic portion of the  gastrointestinal tract is within normal limits. No pelvic adenopathy. No  free fluid or free air. No abnormal mass or fluid collection is  identified. The bladder is unremarkable. Degenerative change is seen  within the spine.     IMPRESSION:  There is stable fibronodular densities identified within the  right middle lobe. Findings are stable. No acute intrathoracic  abnormality identified. No evidence of active or recurrent lymphoma. No  acute abdominal or pelvic abnormality present.     D:  03/19/2018  E:  03/20/2018             Assessment/Plan    1.Stage III diffuse large B-cell lymphoma.  I reviewed the CAT scans that she had performed today with her and compared them to the Cat scans from 6 months ago.  She has no sign of recurrence.  At this time watchful waiting is  recommended.  I will see her back my clinic in 6 months repeat CAT scans prior to that.    Spent 25 minutes on the patient's plan and care with more than 50% of the time spent counseling the patient and reviewing her scans with her and her family.    Mike Dale MD  [facility] Hematology and Oncology    3/20/2018         Please note that portions of this note may have been completed with a voice recognition program. Efforts were made to edit the dictations, but occasionally words are mistranscribed.

## 2018-05-07 ENCOUNTER — TELEPHONE (OUTPATIENT)
Dept: ONCOLOGY | Facility: CLINIC | Age: 83
End: 2018-05-07

## 2018-05-07 NOTE — TELEPHONE ENCOUNTER
----- Message from eLelee Culver sent at 5/7/2018  1:03 PM EDT -----  Regarding: EMANUEL - PLACE ON COLLAR   Contact: [phone]  PATIENT CALLED AND SAID SHE HAS A PLACE ON HER RIGHT COLLAR BONE BELOW HER GLASER APPLE.

## 2018-05-10 ENCOUNTER — OFFICE VISIT (OUTPATIENT)
Dept: ONCOLOGY | Facility: CLINIC | Age: 83
End: 2018-05-10

## 2018-05-10 VITALS
WEIGHT: 123 LBS | DIASTOLIC BLOOD PRESSURE: 49 MMHG | HEIGHT: 64 IN | RESPIRATION RATE: 16 BRPM | HEART RATE: 75 BPM | SYSTOLIC BLOOD PRESSURE: 119 MMHG | BODY MASS INDEX: 21 KG/M2 | TEMPERATURE: 98.8 F

## 2018-05-10 DIAGNOSIS — C83.38 DIFFUSE LARGE B-CELL LYMPHOMA OF LYMPH NODES OF MULTIPLE REGIONS (HCC): Primary | ICD-10-CM

## 2018-05-10 PROCEDURE — 99213 OFFICE O/P EST LOW 20 MIN: CPT | Performed by: INTERNAL MEDICINE

## 2018-05-10 NOTE — PROGRESS NOTES
"PROBLEM LIST:  1. 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 Initiated  CHOP plus Rituxan 10/27/2016  3. Ejection fraction normal prior to his initiating treatment  4. Completed 6 cycles of CHOP-R on 2/9/17         REASON FOR VISIT: Thickening on the right clavicular head.    HISTORY OF PRESENT ILLNESS:   82-year-old lady with stage III diffuse large B cell lymphoma.  She presents today because she is concerned about the thickening she noticed on the right clavicular head.  Denies any pain.  She is trying to wear necklaces and she noticed this.      Past medical history, social history and family history was reviewed and unchanged from prior visit.    Review of Systems:    Review of Systems   Constitutional: Negative.    HENT:  Negative.    Eyes: Negative.    Respiratory: Negative.    Cardiovascular: Negative.    Gastrointestinal: Negative.    Endocrine: Negative.    Genitourinary: Negative.     Musculoskeletal: Negative.    Skin: Negative.    Neurological: Negative.    Hematological: Negative.    Psychiatric/Behavioral: Negative.       A comprehensive 14 point review of systems was performed and was negative except as mentioned.      Medications:  The current medication list was reviewed in the EMR    ALLERGIES:    Allergies   Allergen Reactions   • Latex Itching, Dermatitis and Rash   • Betadine [Povidone Iodine] Itching     Pt says her skin also blistered   • Penicillins Rash         Physical Exam    VITAL SIGNS:  /49   Pulse 75   Temp 98.8 °F (37.1 °C)   Resp 16   Ht 162.6 cm (64\")   Wt 55.8 kg (123 lb)   BMI 21.11 kg/m²      Performance Status: 0    General: well appearing, in no acute distress  HEENT: sclera anicteric, oropharynx clear, neck is supple  Lymphatics: no cervical, supraclavicular, or axillary adenopathy.  Mild thickening of the right clavicular head compared to the left  Cardiovascular: regular rate and rhythm, no murmurs, rubs or gallops  Lungs: clear to auscultation " bilaterally  Abdomen: soft, nontender, nondistended.  No palpable organomegaly  Extremities: no lower extremity edema  Skin: no rashes, lesions, bruising, or petechiae  Msk:  Shows no weakness of the large muscle groups  Psych: Mood is stable      EXAMINATION: CT CHEST W CONTRAST-, CT ABDOMEN AND PELVIS W CONTRAST-      INDICATION: C83.38-Diffuse large B-cell lymphoma, lymph nodes of  multiple sites; followup.     TECHNIQUE: Multiple axial CT imaging was obtained of the chest, abdomen  and pelvis following the administration of oral and intravenous  contrast.     The radiation dose reduction device was turned on for each scan per the  ALARA (As Low as Reasonably Achievable) protocol.     COMPARISON: 09/18/2017.     FINDINGS: CHEST: Thyroid is homogeneous in appearance. There is wall  thickening identified in the esophagus in which upper endoscopy is  recommended for further evaluation. This overall has not significantly  changed when compared to the prior study. Cardiac chambers are within  normal limits.  No pericardial effusion. No bulky hilar or axillary  lymphadenopathy. No abnormal mass or fluid collection is identified.  There is no parenchymal consolidation, no pulmonary mass or nodule. No  pleural effusion. Some mild nodular density is seen within the right  middle lobe which are stable and unchanged when compared to the prior  study. Continued followup is recommended. There is also some stable mild  bronchiectatic changes centrally extending to the mid and lower lung  fields bilaterally. No pleural effusion or pneumothorax. Degenerative  change is seen within the spine.     Degenerative changes seen within the spine.     ABDOMEN: Liver is homogeneous. Gallbladder has been surgically removed.  The spleen is unremarkable. Kidneys and adrenal glands are within normal  limits. No abdominal or retroperitoneal lymphadenopathy. No free fluid  or free air. No abnormal mass or fluid collection is identified.    Pancreas is homogeneous.     PELVIS: The pelvic organs are unremarkable. The pelvic portion of the  gastrointestinal tract is within normal limits. No pelvic adenopathy. No  free fluid or free air. No abnormal mass or fluid collection is  identified. The bladder is unremarkable. Degenerative change is seen  within the spine.     IMPRESSION:  There is stable fibronodular densities identified within the  right middle lobe. Findings are stable. No acute intrathoracic  abnormality identified. No evidence of active or recurrent lymphoma. No  acute abdominal or pelvic abnormality present.     D:  03/19/2018  E:  03/20/2018             Assessment/Plan    1.Stage III diffuse large B-cell lymphoma.    I reviewed her original scans and she did have some lymphadenopathy around the region of the right clavicular head.  I suspect she just has some thickening related to treated disease.  Regardless I think it's reasonable to just watch her closely.  I like to keep her schedule of CT scan in September.  However if there is any worsening of her thickening she can keep me a call and I'll see her sooner.    Spent 15 minutes on the patient's plan and care with more than 50% of the time spent counseling the patient and reviewing her scans with her and her family.    Mike Dale MD  [facility] Hematology and Oncology    5/10/2018         Please note that portions of this note may have been completed with a voice recognition program. Efforts were made to edit the dictations, but occasionally words are mistranscribed.

## 2018-09-24 ENCOUNTER — LAB (OUTPATIENT)
Dept: LAB | Facility: HOSPITAL | Age: 83
End: 2018-09-24

## 2018-09-24 ENCOUNTER — HOSPITAL ENCOUNTER (OUTPATIENT)
Dept: CT IMAGING | Facility: HOSPITAL | Age: 83
Discharge: HOME | End: 2018-09-24
Attending: INTERNAL MEDICINE | Admitting: INTERNAL MEDICINE

## 2018-09-24 DIAGNOSIS — C83.38 DIFFUSE LARGE B-CELL LYMPHOMA OF LYMPH NODES OF MULTIPLE REGIONS (HCC): ICD-10-CM

## 2018-09-24 LAB
ALBUMIN SERPL-MCNC: 4.21 G/DL (ref 3.2–4.8)
ALBUMIN/GLOB SERPL: 2.6 G/DL (ref 1.5–2.5)
ALP SERPL-CCNC: 52 U/L (ref 25–100)
ALT SERPL W P-5'-P-CCNC: 18 U/L (ref 7–40)
ANION GAP SERPL CALCULATED.3IONS-SCNC: 5 MMOL/L (ref 3–11)
AST SERPL-CCNC: 25 U/L (ref 0–33)
BASOPHILS # BLD AUTO: 0.03 10*3/MM3 (ref 0–0.2)
BASOPHILS NFR BLD AUTO: 0.4 % (ref 0–1)
BILIRUB SERPL-MCNC: 0.6 MG/DL (ref 0.3–1.2)
BUN BLD-MCNC: 13 MG/DL (ref 9–23)
BUN/CREAT SERPL: 14.6 (ref 7–25)
CALCIUM SPEC-SCNC: 9.2 MG/DL (ref 8.7–10.4)
CHLORIDE SERPL-SCNC: 102 MMOL/L (ref 99–109)
CO2 SERPL-SCNC: 32 MMOL/L (ref 20–31)
CREAT BLD-MCNC: 0.89 MG/DL (ref 0.6–1.3)
DEPRECATED RDW RBC AUTO: 45 FL (ref 37–54)
EOSINOPHIL # BLD AUTO: 0.15 10*3/MM3 (ref 0–0.3)
EOSINOPHIL NFR BLD AUTO: 2.2 % (ref 0–3)
ERYTHROCYTE [DISTWIDTH] IN BLOOD BY AUTOMATED COUNT: 13 % (ref 11.3–14.5)
GFR SERPL CREATININE-BSD FRML MDRD: 61 ML/MIN/1.73
GLOBULIN UR ELPH-MCNC: 1.6 GM/DL
GLUCOSE BLD-MCNC: 83 MG/DL (ref 70–100)
HCT VFR BLD AUTO: 43.2 % (ref 34.5–44)
HGB BLD-MCNC: 14 G/DL (ref 11.5–15.5)
IMM GRANULOCYTES # BLD: 0.01 10*3/MM3 (ref 0–0.03)
IMM GRANULOCYTES NFR BLD: 0.1 % (ref 0–0.6)
LYMPHOCYTES # BLD AUTO: 1.64 10*3/MM3 (ref 0.6–4.8)
LYMPHOCYTES NFR BLD AUTO: 24.4 % (ref 24–44)
MCH RBC QN AUTO: 30.5 PG (ref 27–31)
MCHC RBC AUTO-ENTMCNC: 32.4 G/DL (ref 32–36)
MCV RBC AUTO: 94.1 FL (ref 80–99)
MONOCYTES # BLD AUTO: 0.49 10*3/MM3 (ref 0–1)
MONOCYTES NFR BLD AUTO: 7.3 % (ref 0–12)
NEUTROPHILS # BLD AUTO: 4.4 10*3/MM3 (ref 1.5–8.3)
NEUTROPHILS NFR BLD AUTO: 65.7 % (ref 41–71)
PLATELET # BLD AUTO: 177 10*3/MM3 (ref 150–450)
PMV BLD AUTO: 10.6 FL (ref 6–12)
POTASSIUM BLD-SCNC: 4.7 MMOL/L (ref 3.5–5.5)
PROT SERPL-MCNC: 5.8 G/DL (ref 5.7–8.2)
RBC # BLD AUTO: 4.59 10*6/MM3 (ref 3.89–5.14)
SODIUM BLD-SCNC: 139 MMOL/L (ref 132–146)
WBC NRBC COR # BLD: 6.71 10*3/MM3 (ref 3.5–10.8)

## 2018-09-24 PROCEDURE — 71260 CT THORAX DX C+: CPT

## 2018-09-24 PROCEDURE — 85025 COMPLETE CBC W/AUTO DIFF WBC: CPT

## 2018-09-24 PROCEDURE — 80053 COMPREHEN METABOLIC PANEL: CPT

## 2018-09-24 PROCEDURE — 82565 ASSAY OF CREATININE: CPT

## 2018-09-24 PROCEDURE — 74177 CT ABD & PELVIS W/CONTRAST: CPT

## 2018-09-24 PROCEDURE — 25010000002 IOPAMIDOL 61 % SOLUTION: Performed by: INTERNAL MEDICINE

## 2018-09-24 PROCEDURE — 63710000001 BARIUM 2 % SUSPENSION: Performed by: INTERNAL MEDICINE

## 2018-09-24 PROCEDURE — 36415 COLL VENOUS BLD VENIPUNCTURE: CPT

## 2018-09-24 PROCEDURE — A9270 NON-COVERED ITEM OR SERVICE: HCPCS | Performed by: INTERNAL MEDICINE

## 2018-09-24 RX ADMIN — IOPAMIDOL 60 ML: 612 INJECTION, SOLUTION INTRAVENOUS at 11:40

## 2018-09-24 RX ADMIN — BARIUM SULFATE 450 ML: 21 SUSPENSION ORAL at 10:30

## 2018-09-25 LAB — CREAT BLDA-MCNC: 1 MG/DL (ref 0.6–1.3)

## 2018-09-27 ENCOUNTER — OFFICE VISIT (OUTPATIENT)
Dept: ONCOLOGY | Facility: CLINIC | Age: 83
End: 2018-09-27

## 2018-09-27 VITALS
BODY MASS INDEX: 22.02 KG/M2 | DIASTOLIC BLOOD PRESSURE: 55 MMHG | WEIGHT: 129 LBS | HEART RATE: 75 BPM | SYSTOLIC BLOOD PRESSURE: 113 MMHG | HEIGHT: 64 IN | RESPIRATION RATE: 16 BRPM | TEMPERATURE: 97.8 F | OXYGEN SATURATION: 98 %

## 2018-09-27 DIAGNOSIS — C83.38 DIFFUSE LARGE B-CELL LYMPHOMA OF LYMPH NODES OF MULTIPLE REGIONS (HCC): Primary | ICD-10-CM

## 2018-09-27 PROCEDURE — 99213 OFFICE O/P EST LOW 20 MIN: CPT | Performed by: INTERNAL MEDICINE

## 2018-09-27 NOTE — PROGRESS NOTES
"PROBLEM LIST:  1. 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 Initiated  CHOP plus Rituxan 10/27/2016  3. Ejection fraction normal prior to his initiating treatment  4. Completed 6 cycles of CHOP-R on 2/9/17         REASON FOR VISIT: Thickening on the right clavicular head.    HISTORY OF PRESENT ILLNESS:   82-year-old lady with stage III diffuse large B cell lymphoma.  No major issue since I last saw her.  Her thickening right clavicular head still is present.  No significant changes there.  No night sweats.  No infections.      Past medical history, social history and family history was reviewed and unchanged from prior visit.    Review of Systems:    Review of Systems   Constitutional: Negative.    HENT:  Negative.    Eyes: Negative.    Respiratory: Negative.    Cardiovascular: Negative.    Gastrointestinal: Negative.    Endocrine: Negative.    Genitourinary: Negative.     Musculoskeletal: Negative.    Skin: Negative.    Neurological: Negative.    Hematological: Negative.    Psychiatric/Behavioral: Negative.       A comprehensive 14 point review of systems was performed and was negative except as mentioned.      Medications:  The current medication list was reviewed in the EMR    ALLERGIES:    Allergies   Allergen Reactions   • Latex Itching, Dermatitis and Rash   • Betadine [Povidone Iodine] Itching     Pt says her skin also blistered   • Penicillins Rash         Physical Exam    VITAL SIGNS:  /55   Pulse 75   Temp 97.8 °F (36.6 °C)   Resp 16   Ht 162.6 cm (64\")   Wt 58.5 kg (129 lb)   SpO2 98%   BMI 22.14 kg/m²      Performance Status: 0    General: well appearing, in no acute distress  HEENT: sclera anicteric, oropharynx clear, neck is supple  Lymphatics: no cervical, supraclavicular, or axillary adenopathy.  Mild thickening of the right clavicular head compared to the left  Cardiovascular: regular rate and rhythm, no murmurs, rubs or gallops  Lungs: clear to auscultation bilaterally  Abdomen: " soft, nontender, nondistended.  No palpable organomegaly  Extremities: no lower extremity edema  Skin: no rashes, lesions, bruising, or petechiae  Msk:  Shows no weakness of the large muscle groups  Psych: Mood is stable    Ct Chest W Contrast    Result Date: 9/24/2018  Stable examination with no evidence of metastatic or recurrent disease. There is a fibronodular density again seen within the right middle lobe measuring 1 cm in size which is stable. No new findings.  D:  09/24/2018 E:  09/24/2018    This report was finalized on 9/24/2018 3:43 PM by Dr. Aleisha Cabrera MD.      Ct Abdomen Pelvis W Contrast    Result Date: 9/24/2018  Stable examination with no evidence of metastatic or recurrent disease. There is a fibronodular density again seen within the right middle lobe measuring 1 cm in size which is stable. No new findings.  D:  09/24/2018 E:  09/24/2018    This report was finalized on 9/24/2018 3:43 PM by Dr. Aleisha Cabrera MD.      Lab Results   Component Value Date    HGB 14.0 09/24/2018    HCT 43.2 09/24/2018    MCV 94.1 09/24/2018     09/24/2018    WBC 6.71 09/24/2018    NEUTROABS 4.40 09/24/2018    LYMPHSABS 1.64 09/24/2018    MONOSABS 0.49 09/24/2018    EOSABS 0.15 09/24/2018    BASOSABS 0.03 09/24/2018       Lab Results   Component Value Date    GLUCOSE 83 09/24/2018    BUN 13 09/24/2018    CREATININE 0.89 09/24/2018     09/24/2018    K 4.7 09/24/2018     09/24/2018    CO2 32.0 (H) 09/24/2018    CALCIUM 9.2 09/24/2018    PROTEINTOT 5.8 09/24/2018    ALBUMIN 4.21 09/24/2018    BILITOT 0.6 09/24/2018    ALKPHOS 52 09/24/2018    AST 25 09/24/2018    ALT 18 09/24/2018             Assessment/Plan    1.Stage III diffuse large B-cell lymphoma.  No sign of recurrence.  She is almost 2 years out from her diagnosis.  Her next scan will be in 6 months which will be her last one.  If she has any symptoms in the near future she can always give me a call.        Spent 15 minutes on the  patient's plan and care with more than 50% of the time spent counseling the patient and reviewing her scans with her and her     Mike Dale MD  [facility] Hematology and Oncology    9/27/2018         Please note that portions of this note may have been completed with a voice recognition program. Efforts were made to edit the dictations, but occasionally words are mistranscribed.

## 2019-04-22 ENCOUNTER — LAB (OUTPATIENT)
Dept: LAB | Facility: HOSPITAL | Age: 84
End: 2019-04-22

## 2019-04-22 ENCOUNTER — HOSPITAL ENCOUNTER (OUTPATIENT)
Dept: CT IMAGING | Facility: HOSPITAL | Age: 84
Discharge: HOME | End: 2019-04-22
Attending: INTERNAL MEDICINE | Admitting: INTERNAL MEDICINE

## 2019-04-22 ENCOUNTER — OFFICE VISIT (OUTPATIENT)
Dept: ONCOLOGY | Facility: CLINIC | Age: 84
End: 2019-04-22

## 2019-04-22 VITALS
DIASTOLIC BLOOD PRESSURE: 58 MMHG | WEIGHT: 137 LBS | BODY MASS INDEX: 23.39 KG/M2 | HEIGHT: 64 IN | TEMPERATURE: 97.8 F | HEART RATE: 80 BPM | RESPIRATION RATE: 16 BRPM | SYSTOLIC BLOOD PRESSURE: 125 MMHG

## 2019-04-22 DIAGNOSIS — C83.38 DIFFUSE LARGE B-CELL LYMPHOMA OF LYMPH NODES OF MULTIPLE REGIONS (HCC): ICD-10-CM

## 2019-04-22 DIAGNOSIS — C83.38 DIFFUSE LARGE B-CELL LYMPHOMA OF LYMPH NODES OF MULTIPLE REGIONS (HCC): Primary | ICD-10-CM

## 2019-04-22 LAB
ALBUMIN SERPL-MCNC: 4.2 G/DL (ref 3.5–5.2)
ALBUMIN/GLOB SERPL: 2.1 G/DL
ALP SERPL-CCNC: 50 U/L (ref 39–117)
ALT SERPL W P-5'-P-CCNC: 12 U/L (ref 1–33)
ANION GAP SERPL CALCULATED.3IONS-SCNC: 12 MMOL/L
AST SERPL-CCNC: 17 U/L (ref 1–32)
BASOPHILS # BLD AUTO: 0.02 10*3/MM3 (ref 0–0.2)
BASOPHILS NFR BLD AUTO: 0.4 % (ref 0–1.5)
BILIRUB SERPL-MCNC: 0.5 MG/DL (ref 0.2–1.2)
BUN BLD-MCNC: 10 MG/DL (ref 8–23)
BUN/CREAT SERPL: 10.5 (ref 7–25)
CALCIUM SPEC-SCNC: 9.2 MG/DL (ref 8.6–10.5)
CHLORIDE SERPL-SCNC: 101 MMOL/L (ref 98–107)
CO2 SERPL-SCNC: 26 MMOL/L (ref 22–29)
CREAT BLD-MCNC: 0.95 MG/DL (ref 0.57–1)
DEPRECATED RDW RBC AUTO: 45.6 FL (ref 37–54)
EOSINOPHIL # BLD AUTO: 0.14 10*3/MM3 (ref 0–0.4)
EOSINOPHIL NFR BLD AUTO: 2.6 % (ref 0.3–6.2)
ERYTHROCYTE [DISTWIDTH] IN BLOOD BY AUTOMATED COUNT: 13.2 % (ref 12.3–15.4)
GFR SERPL CREATININE-BSD FRML MDRD: 56 ML/MIN/1.73
GLOBULIN UR ELPH-MCNC: 2 GM/DL
GLUCOSE BLD-MCNC: 107 MG/DL (ref 65–99)
HCT VFR BLD AUTO: 40.3 % (ref 34–46.6)
HGB BLD-MCNC: 13 G/DL (ref 12–15.9)
IMM GRANULOCYTES # BLD AUTO: 0.01 10*3/MM3 (ref 0–0.05)
IMM GRANULOCYTES NFR BLD AUTO: 0.2 % (ref 0–0.5)
LDH SERPL-CCNC: 126 U/L (ref 135–214)
LYMPHOCYTES # BLD AUTO: 1.48 10*3/MM3 (ref 0.7–3.1)
LYMPHOCYTES NFR BLD AUTO: 27.6 % (ref 19.6–45.3)
MCH RBC QN AUTO: 30.7 PG (ref 26.6–33)
MCHC RBC AUTO-ENTMCNC: 32.3 G/DL (ref 31.5–35.7)
MCV RBC AUTO: 95.3 FL (ref 79–97)
MONOCYTES # BLD AUTO: 0.43 10*3/MM3 (ref 0.1–0.9)
MONOCYTES NFR BLD AUTO: 8 % (ref 5–12)
NEUTROPHILS # BLD AUTO: 3.3 10*3/MM3 (ref 1.7–7)
NEUTROPHILS NFR BLD AUTO: 61.4 % (ref 42.7–76)
PLATELET # BLD AUTO: 178 10*3/MM3 (ref 140–450)
PMV BLD AUTO: 10.4 FL (ref 6–12)
POTASSIUM BLD-SCNC: 4.1 MMOL/L (ref 3.5–5.2)
PROT SERPL-MCNC: 6.2 G/DL (ref 6–8.5)
RBC # BLD AUTO: 4.23 10*6/MM3 (ref 3.77–5.28)
SODIUM BLD-SCNC: 139 MMOL/L (ref 136–145)
WBC NRBC COR # BLD: 5.37 10*3/MM3 (ref 3.4–10.8)

## 2019-04-22 PROCEDURE — 80053 COMPREHEN METABOLIC PANEL: CPT

## 2019-04-22 PROCEDURE — 71260 CT THORAX DX C+: CPT

## 2019-04-22 PROCEDURE — 82565 ASSAY OF CREATININE: CPT

## 2019-04-22 PROCEDURE — 74177 CT ABD & PELVIS W/CONTRAST: CPT

## 2019-04-22 PROCEDURE — 25010000002 IOPAMIDOL 61 % SOLUTION: Performed by: INTERNAL MEDICINE

## 2019-04-22 PROCEDURE — A9270 NON-COVERED ITEM OR SERVICE: HCPCS | Performed by: INTERNAL MEDICINE

## 2019-04-22 PROCEDURE — 85025 COMPLETE CBC W/AUTO DIFF WBC: CPT

## 2019-04-22 PROCEDURE — 36415 COLL VENOUS BLD VENIPUNCTURE: CPT

## 2019-04-22 PROCEDURE — 99213 OFFICE O/P EST LOW 20 MIN: CPT | Performed by: INTERNAL MEDICINE

## 2019-04-22 PROCEDURE — 63710000001 BARIUM 2 % SUSPENSION: Performed by: INTERNAL MEDICINE

## 2019-04-22 PROCEDURE — 83615 LACTATE (LD) (LDH) ENZYME: CPT

## 2019-04-22 RX ADMIN — BARIUM SULFATE 450 ML: 21 SUSPENSION ORAL at 10:30

## 2019-04-22 RX ADMIN — IOPAMIDOL 95 ML: 612 INJECTION, SOLUTION INTRAVENOUS at 11:36

## 2019-04-22 NOTE — PROGRESS NOTES
"PROBLEM LIST:  1. 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 Initiated  CHOP plus Rituxan 10/27/2016  3. Ejection fraction normal prior to his initiating treatment  4. Completed 6 cycles of CHOP-R on 2/9/17         REASON FOR VISIT: Thickening on the right clavicular head.    HISTORY OF PRESENT ILLNESS:   83 y.o.  lady with stage III diffuse large B cell lymphoma.  No major issue since I last saw her.  Her thickening right clavicular head still is present though better.  No significant changes there.  No night sweats.  No infections.      Past medical history, social history and family history was reviewed and unchanged from prior visit.    Review of Systems:    Review of Systems   Constitutional: Negative.    HENT:  Negative.    Eyes: Negative.    Respiratory: Negative.    Cardiovascular: Negative.    Gastrointestinal: Negative.    Endocrine: Negative.    Genitourinary: Negative.     Musculoskeletal: Negative.    Skin: Negative.    Neurological: Negative.    Hematological: Negative.    Psychiatric/Behavioral: Negative.       A comprehensive 14 point review of systems was performed and was negative except as mentioned.      Medications:  The current medication list was reviewed in the EMR    ALLERGIES:    Allergies   Allergen Reactions   • Latex Itching, Dermatitis and Rash   • Betadine [Povidone Iodine] Itching     Pt says her skin also blistered   • Penicillins Rash         Physical Exam    VITAL SIGNS:  /58   Pulse 80   Temp 97.8 °F (36.6 °C) (Temporal)   Resp 16   Ht 162.6 cm (64\")   Wt 62.1 kg (137 lb)   BMI 23.52 kg/m²      Performance Status: 0    General: well appearing, in no acute distress  HEENT: sclera anicteric, oropharynx clear, neck is supple  Lymphatics: no cervical, supraclavicular, or axillary adenopathy.  Mild thickening of the right clavicular head compared to the left  Cardiovascular: regular rate and rhythm, no murmurs, rubs or gallops  Lungs: clear to auscultation " bilaterally  Abdomen: soft, nontender, nondistended.  No palpable organomegaly  Extremities: no lower extremity edema  Skin: no rashes, lesions, bruising, or petechiae  Msk:  Shows no weakness of the large muscle groups  Psych: Mood is stable    Ct Chest W Contrast    Result Date: 9/24/2018  Stable examination with no evidence of metastatic or recurrent disease. There is a fibronodular density again seen within the right middle lobe measuring 1 cm in size which is stable. No new findings.  D:  09/24/2018 E:  09/24/2018    This report was finalized on 9/24/2018 3:43 PM by Dr. Aleisha Cabrera MD.      Ct Abdomen Pelvis W Contrast    Result Date: 9/24/2018  Stable examination with no evidence of metastatic or recurrent disease. There is a fibronodular density again seen within the right middle lobe measuring 1 cm in size which is stable. No new findings.  D:  09/24/2018 E:  09/24/2018    This report was finalized on 9/24/2018 3:43 PM by Dr. Aleisha Cabrera MD.      Lab Results   Component Value Date    HGB 13.0 04/22/2019    HCT 40.3 04/22/2019    MCV 95.3 04/22/2019     04/22/2019    WBC 5.37 04/22/2019    NEUTROABS 3.30 04/22/2019    LYMPHSABS 1.48 04/22/2019    MONOSABS 0.43 04/22/2019    EOSABS 0.14 04/22/2019    BASOSABS 0.02 04/22/2019       Lab Results   Component Value Date    GLUCOSE 83 09/24/2018    BUN 13 09/24/2018    CREATININE 0.89 09/24/2018     09/24/2018    K 4.7 09/24/2018     09/24/2018    CO2 32.0 (H) 09/24/2018    CALCIUM 9.2 09/24/2018    PROTEINTOT 5.8 09/24/2018    ALBUMIN 4.21 09/24/2018    BILITOT 0.6 09/24/2018    ALKPHOS 52 09/24/2018    AST 25 09/24/2018    ALT 18 09/24/2018       Ct Chest With Contrast    Result Date: 4/22/2019  Stable examination with no evidence of metastatic or recurrent disease. There are no changes in the interval. No active or recurrent lymphoma.  D:  04/22/2019 E:  04/22/2019      Ct Abdomen Pelvis With Contrast    Result Date:  4/22/2019  Stable examination with no evidence of metastatic or recurrent disease. There are no changes in the interval. No active or recurrent lymphoma.  D:  04/22/2019 E:  04/22/2019            Assessment/Plan    1.Stage III diffuse large B-cell lymphoma.  No sign of recurrence.  She is over 2 years out from her diagnosis.  She has had surveillance scans for 2 years with no sign of recurrence.  At this point she does not need any further scans unless she becomes symptomatic.  Like to see her back in my clinic in 1 year.  If she has any issues in the interim she can give me a call.  Her risk of recurrence at this point is well less than 10%.    I spent a total of 15 minutes in direct patient care, greater than 10 minutes (greater than 50%) were spent in coordination of care, and counseling the patient regarding  Lymphoma . Answered any questions patient had with medication and plan.      Mike Dale MD  [facility] Hematology and Oncology    4/22/2019     Return on: 04/20/20  Return in (Approximately): 1 year    Orders Placed This Encounter   Procedures   • Lactate Dehydrogenase     Standing Status:   Future     Standing Expiration Date:   4/22/2020   • Comprehensive Metabolic Panel     Standing Status:   Future     Standing Expiration Date:   4/22/2020   • CBC & Differential     Standing Status:   Future     Standing Expiration Date:   4/29/2020     Order Specific Question:   Manual Differential     Answer:   No         Please note that portions of this note may have been completed with a voice recognition program. Efforts were made to edit the dictations, but occasionally words are mistranscribed.

## 2019-04-23 LAB — CREAT BLDA-MCNC: 0.9 MG/DL (ref 0.6–1.3)

## 2020-06-22 ENCOUNTER — OFFICE VISIT (OUTPATIENT)
Dept: ONCOLOGY | Facility: CLINIC | Age: 85
End: 2020-06-22

## 2020-06-22 ENCOUNTER — LAB (OUTPATIENT)
Dept: LAB | Facility: HOSPITAL | Age: 85
End: 2020-06-22

## 2020-06-22 VITALS
TEMPERATURE: 97.4 F | RESPIRATION RATE: 16 BRPM | DIASTOLIC BLOOD PRESSURE: 64 MMHG | BODY MASS INDEX: 20.83 KG/M2 | HEIGHT: 64 IN | WEIGHT: 122 LBS | SYSTOLIC BLOOD PRESSURE: 140 MMHG | HEART RATE: 68 BPM | OXYGEN SATURATION: 96 %

## 2020-06-22 DIAGNOSIS — C83.38 DIFFUSE LARGE B-CELL LYMPHOMA OF LYMPH NODES OF MULTIPLE REGIONS (HCC): ICD-10-CM

## 2020-06-22 DIAGNOSIS — C83.38 DIFFUSE LARGE B-CELL LYMPHOMA OF LYMPH NODES OF MULTIPLE REGIONS (HCC): Primary | ICD-10-CM

## 2020-06-22 LAB
ALBUMIN SERPL-MCNC: 4 G/DL (ref 3.5–5.2)
ALBUMIN/GLOB SERPL: 1.9 G/DL
ALP SERPL-CCNC: 46 U/L (ref 39–117)
ALT SERPL W P-5'-P-CCNC: 11 U/L (ref 1–33)
ANION GAP SERPL CALCULATED.3IONS-SCNC: 8 MMOL/L (ref 5–15)
AST SERPL-CCNC: 21 U/L (ref 1–32)
BILIRUB SERPL-MCNC: 0.4 MG/DL (ref 0.2–1.2)
BUN BLD-MCNC: 13 MG/DL (ref 8–23)
BUN/CREAT SERPL: 13.8 (ref 7–25)
CALCIUM SPEC-SCNC: 9 MG/DL (ref 8.6–10.5)
CHLORIDE SERPL-SCNC: 103 MMOL/L (ref 98–107)
CO2 SERPL-SCNC: 30 MMOL/L (ref 22–29)
CREAT BLD-MCNC: 0.94 MG/DL (ref 0.57–1)
ERYTHROCYTE [DISTWIDTH] IN BLOOD BY AUTOMATED COUNT: 13.2 % (ref 12.3–15.4)
GFR SERPL CREATININE-BSD FRML MDRD: 57 ML/MIN/1.73
GLOBULIN UR ELPH-MCNC: 2.1 GM/DL
GLUCOSE BLD-MCNC: 106 MG/DL (ref 65–99)
HCT VFR BLD AUTO: 41.6 % (ref 34–46.6)
HGB BLD-MCNC: 13.1 G/DL (ref 12–15.9)
LDH SERPL-CCNC: 165 U/L (ref 135–214)
LYMPHOCYTES # BLD AUTO: 1.4 10*3/MM3 (ref 0.7–3.1)
LYMPHOCYTES NFR BLD AUTO: 29.4 % (ref 19.6–45.3)
MCH RBC QN AUTO: 29.6 PG (ref 26.6–33)
MCHC RBC AUTO-ENTMCNC: 31.4 G/DL (ref 31.5–35.7)
MCV RBC AUTO: 94.2 FL (ref 79–97)
MONOCYTES # BLD AUTO: 0.3 10*3/MM3 (ref 0.1–0.9)
MONOCYTES NFR BLD AUTO: 6.3 % (ref 5–12)
NEUTROPHILS # BLD AUTO: 3.2 10*3/MM3 (ref 1.7–7)
NEUTROPHILS NFR BLD AUTO: 64.3 % (ref 42.7–76)
PLATELET # BLD AUTO: 153 10*3/MM3 (ref 140–450)
PMV BLD AUTO: 7.2 FL (ref 6–12)
POTASSIUM BLD-SCNC: 4.4 MMOL/L (ref 3.5–5.2)
PROT SERPL-MCNC: 6.1 G/DL (ref 6–8.5)
RBC # BLD AUTO: 4.41 10*6/MM3 (ref 3.77–5.28)
SODIUM BLD-SCNC: 141 MMOL/L (ref 136–145)
WBC NRBC COR # BLD: 4.9 10*3/MM3 (ref 3.4–10.8)

## 2020-06-22 PROCEDURE — 99213 OFFICE O/P EST LOW 20 MIN: CPT | Performed by: NURSE PRACTITIONER

## 2020-06-22 PROCEDURE — 80053 COMPREHEN METABOLIC PANEL: CPT

## 2020-06-22 PROCEDURE — 36415 COLL VENOUS BLD VENIPUNCTURE: CPT

## 2020-06-22 PROCEDURE — 85025 COMPLETE CBC W/AUTO DIFF WBC: CPT

## 2020-06-22 PROCEDURE — 83615 LACTATE (LD) (LDH) ENZYME: CPT

## 2020-06-22 RX ORDER — LEVOTHYROXINE SODIUM 100 MCG
100 TABLET ORAL EVERY MORNING
COMMUNITY
Start: 2020-05-11 | End: 2022-09-26

## 2020-06-22 NOTE — PROGRESS NOTES
"PROBLEM LIST:  1. 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 Initiated  CHOP plus Rituxan 10/27/2016  3. Ejection fraction normal prior to his initiating treatment  4. Completed 6 cycles of CHOP-R on 2/9/17         REASON FOR VISIT: Thickening on the right clavicular head.    HISTORY OF PRESENT ILLNESS:   84 y.o.  lady with stage III diffuse large B cell lymphoma.  Clinically doing well without any new issues or concerns.  She continues to have some intermittent burning pain from shingles outbreak a few years ago.  No night sweats or recurrent infections.      Past medical history, social history and family history was reviewed and unchanged from prior visit.    Review of Systems:    Review of Systems   Constitutional: Negative.    HENT:  Negative.    Eyes: Negative.    Respiratory: Negative.    Cardiovascular: Negative.    Gastrointestinal: Negative.    Endocrine: Negative.    Genitourinary: Negative.     Musculoskeletal: Negative.    Skin: Negative.    Neurological: Negative.    Hematological: Negative.    Psychiatric/Behavioral: Negative.       A comprehensive 14 point review of systems was performed and was negative except as mentioned.      Medications:  The current medication list was reviewed in the EMR    ALLERGIES:    Allergies   Allergen Reactions   • Latex Itching, Dermatitis and Rash   • Betadine [Povidone Iodine] Itching     Pt says her skin also blistered   • Sulfa Antibiotics Swelling   • Penicillins Rash         Physical Exam    VITAL SIGNS:  /64 Comment: RUE  Pulse 68   Temp 97.4 °F (36.3 °C) (Temporal)   Resp 16   Ht 162.6 cm (64\")   Wt 55.3 kg (122 lb)   SpO2 96% Comment: RA  BMI 20.94 kg/m²    Pain Score    06/22/20 1329   PainSc: 0-No pain        Performance Status: 0    General: well appearing, in no acute distress  HEENT: sclera anicteric, oropharynx clear, neck is supple  Lymphatics: no cervical, supraclavicular, or axillary adenopathy.  Mild thickening of the right clavicular " head compared to the left  Cardiovascular: regular rate and rhythm, no murmurs, rubs or gallops  Lungs: clear to auscultation bilaterally  Abdomen: soft, nontender, nondistended.  No palpable organomegaly  Extremities: no lower extremity edema  Skin: no rashes, lesions, bruising, or petechiae  Msk:  Shows no weakness of the large muscle groups  Psych: Mood is stable    Ct Chest With Contrast    Result Date: 4/22/2019  Stable examination with no evidence of metastatic or recurrent disease. There are no changes in the interval. No active or recurrent lymphoma.  D:  04/22/2019 E:  04/22/2019      Ct Abdomen Pelvis With Contrast    Result Date: 4/22/2019  Stable examination with no evidence of metastatic or recurrent disease. There are no changes in the interval. No active or recurrent lymphoma.  D:  04/22/2019 E:  04/22/2019      Lab Results   Component Value Date    HGB 13.1 06/22/2020    HCT 41.6 06/22/2020    MCV 94.2 06/22/2020     06/22/2020    WBC 4.90 06/22/2020    NEUTROABS 3.20 06/22/2020    LYMPHSABS 1.40 06/22/2020    MONOSABS 0.30 06/22/2020    EOSABS 0.14 04/22/2019    BASOSABS 0.02 04/22/2019     Lab Results   Component Value Date    GLUCOSE 106 (H) 06/22/2020    BUN 13 06/22/2020    CREATININE 0.94 06/22/2020     06/22/2020    K 4.4 06/22/2020     06/22/2020    CO2 30.0 (H) 06/22/2020    CALCIUM 9.0 06/22/2020    PROTEINTOT 6.1 06/22/2020    ALBUMIN 4.00 06/22/2020    BILITOT 0.4 06/22/2020    ALKPHOS 46 06/22/2020    AST 21 06/22/2020    ALT 11 06/22/2020       Lab Results   Component Value Date     06/22/2020     (L) 04/22/2019     10/27/2016         Assessment/Plan    1.  Stage III diffuse large B-cell lymphoma.  No clinical sign of recurrence.  She is over 3 years out from her diagnosis.  At this point she does not need any further scans unless she becomes symptomatic.  We will check labs today and notify her of any significant findings.  We will see her back  in 1 year.  If she has any issues in the interim she will notify us to be seen sooner.      I spent a total of 15 minutes in direct patient care, greater than 10 minutes (greater than 50%) were spent in coordination of care, and counseling the patient regarding Lymphoma . Answered any questions patient had with medication and plan.      Ledy Flores [facility] Hematology and Oncology    6/22/2020     Please note that portions of this note may have been completed with a voice recognition program. Efforts were made to edit the dictations, but occasionally words are mistranscribed.

## 2020-08-19 ENCOUNTER — TELEPHONE (OUTPATIENT)
Dept: ONCOLOGY | Facility: CLINIC | Age: 85
End: 2020-08-19

## 2020-08-19 NOTE — TELEPHONE ENCOUNTER
PT called to have her most recent lab work axed over to her PCP @fax# [phone] and mail the pt a copy if possible

## 2021-06-23 ENCOUNTER — LAB (OUTPATIENT)
Dept: LAB | Facility: HOSPITAL | Age: 86
End: 2021-06-23

## 2021-06-23 ENCOUNTER — OFFICE VISIT (OUTPATIENT)
Dept: ONCOLOGY | Facility: CLINIC | Age: 86
End: 2021-06-23

## 2021-06-23 VITALS
TEMPERATURE: 98.6 F | SYSTOLIC BLOOD PRESSURE: 131 MMHG | HEIGHT: 64 IN | BODY MASS INDEX: 20.45 KG/M2 | RESPIRATION RATE: 16 BRPM | HEART RATE: 72 BPM | WEIGHT: 119.8 LBS | DIASTOLIC BLOOD PRESSURE: 73 MMHG | OXYGEN SATURATION: 98 %

## 2021-06-23 DIAGNOSIS — C83.38 DIFFUSE LARGE B-CELL LYMPHOMA OF LYMPH NODES OF MULTIPLE REGIONS (HCC): ICD-10-CM

## 2021-06-23 DIAGNOSIS — C83.38 DIFFUSE LARGE B-CELL LYMPHOMA OF LYMPH NODES OF MULTIPLE REGIONS (HCC): Primary | ICD-10-CM

## 2021-06-23 LAB
ALBUMIN SERPL-MCNC: 4.3 G/DL (ref 3.5–5.2)
ALBUMIN/GLOB SERPL: 2 G/DL
ALP SERPL-CCNC: 58 U/L (ref 39–117)
ALT SERPL W P-5'-P-CCNC: 12 U/L (ref 1–33)
ANION GAP SERPL CALCULATED.3IONS-SCNC: 8 MMOL/L (ref 5–15)
AST SERPL-CCNC: 19 U/L (ref 1–32)
BILIRUB SERPL-MCNC: 0.3 MG/DL (ref 0–1.2)
BUN SERPL-MCNC: 16 MG/DL (ref 8–23)
BUN/CREAT SERPL: 18.4 (ref 7–25)
CALCIUM SPEC-SCNC: 9.9 MG/DL (ref 8.6–10.5)
CHLORIDE SERPL-SCNC: 103 MMOL/L (ref 98–107)
CO2 SERPL-SCNC: 30 MMOL/L (ref 22–29)
CREAT SERPL-MCNC: 0.87 MG/DL (ref 0.57–1)
ERYTHROCYTE [DISTWIDTH] IN BLOOD BY AUTOMATED COUNT: 12.6 % (ref 12.3–15.4)
GFR SERPL CREATININE-BSD FRML MDRD: 62 ML/MIN/1.73
GLOBULIN UR ELPH-MCNC: 2.1 GM/DL
GLUCOSE SERPL-MCNC: 95 MG/DL (ref 65–99)
HCT VFR BLD AUTO: 42.7 % (ref 34–46.6)
HGB BLD-MCNC: 13.9 G/DL (ref 12–15.9)
LDH SERPL-CCNC: 130 U/L (ref 135–214)
LYMPHOCYTES # BLD AUTO: 1.1 10*3/MM3 (ref 0.7–3.1)
LYMPHOCYTES NFR BLD AUTO: 17.9 % (ref 19.6–45.3)
MCH RBC QN AUTO: 30 PG (ref 26.6–33)
MCHC RBC AUTO-ENTMCNC: 32.5 G/DL (ref 31.5–35.7)
MCV RBC AUTO: 92.2 FL (ref 79–97)
MONOCYTES # BLD AUTO: 0.5 10*3/MM3 (ref 0.1–0.9)
MONOCYTES NFR BLD AUTO: 7.8 % (ref 5–12)
NEUTROPHILS NFR BLD AUTO: 4.8 10*3/MM3 (ref 1.7–7)
NEUTROPHILS NFR BLD AUTO: 74.3 % (ref 42.7–76)
PLATELET # BLD AUTO: 181 10*3/MM3 (ref 140–450)
PMV BLD AUTO: 7.4 FL (ref 6–12)
POTASSIUM SERPL-SCNC: 4.8 MMOL/L (ref 3.5–5.2)
PROT SERPL-MCNC: 6.4 G/DL (ref 6–8.5)
RBC # BLD AUTO: 4.62 10*6/MM3 (ref 3.77–5.28)
SODIUM SERPL-SCNC: 141 MMOL/L (ref 136–145)
WBC # BLD AUTO: 6.4 10*3/MM3 (ref 3.4–10.8)

## 2021-06-23 PROCEDURE — 36415 COLL VENOUS BLD VENIPUNCTURE: CPT

## 2021-06-23 PROCEDURE — 85025 COMPLETE CBC W/AUTO DIFF WBC: CPT

## 2021-06-23 PROCEDURE — 83615 LACTATE (LD) (LDH) ENZYME: CPT

## 2021-06-23 PROCEDURE — 99213 OFFICE O/P EST LOW 20 MIN: CPT | Performed by: NURSE PRACTITIONER

## 2021-06-23 PROCEDURE — 80053 COMPREHEN METABOLIC PANEL: CPT

## 2021-06-23 NOTE — PROGRESS NOTES
"PROBLEM LIST:  1. 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 Initiated  CHOP plus Rituxan 10/27/2016  3. Ejection fraction normal prior to his initiating treatment  4. Completed 6 cycles of CHOP-R on 2/9/17       REASON FOR VISIT: follow up diffuse large B cell lymphoma    HISTORY OF PRESENT ILLNESS:   85 y.o.  lady with stage III diffuse large B cell lymphoma.  Clinically doing well without any new issues or concerns.  No night sweats or recurrent infections.  Weight is stable.  She has trouble sleeping and just started taking melatonin.      Past medical history, social history and family history was reviewed and unchanged from prior visit.    Review of Systems:    Review of Systems   Constitutional: Negative.    HENT:  Negative.    Eyes: Negative.    Respiratory: Negative.    Cardiovascular: Negative.    Gastrointestinal: Negative.    Endocrine: Negative.    Genitourinary: Negative.     Musculoskeletal: Negative.    Skin: Negative.    Neurological: Negative.    Hematological: Negative.    Psychiatric/Behavioral: Positive for sleep disturbance.      A comprehensive 14 point review of systems was performed and was negative except as mentioned.      Medications:  The current medication list was reviewed in the EMR    ALLERGIES:    Allergies   Allergen Reactions   • Latex Itching, Dermatitis and Rash   • Betadine [Povidone Iodine] Itching     Pt says her skin also blistered   • Sulfa Antibiotics Swelling   • Penicillins Rash       Physical Exam    VITAL SIGNS:  /73   Pulse 72   Temp 98.6 °F (37 °C) (Temporal)   Resp 16   Ht 162.6 cm (64\")   Wt 54.3 kg (119 lb 12.8 oz)   SpO2 98%   BMI 20.56 kg/m²    Pain Score    06/23/21 1400   PainSc: 0-No pain        Performance Status: 0    General: well appearing, in no acute distress  Lymphatics: no cervical, supraclavicular, or axillary adenopathy.  Mild thickening of the right clavicular head compared to the left, no pain  Cardiovascular: regular rate and " rhythm, no murmurs, rubs or gallops  Lungs: clear to auscultation bilaterally  Abdomen: soft, nontender, nondistended.   Extremities: no lower extremity edema  Skin: no rashes, lesions, bruising, or petechiae  Msk:  Shows no weakness of the large muscle groups  Psych: Mood is stable    Ct Chest With Contrast    Result Date: 4/22/2019  Stable examination with no evidence of metastatic or recurrent disease. There are no changes in the interval. No active or recurrent lymphoma.  D:  04/22/2019 E:  04/22/2019      Ct Abdomen Pelvis With Contrast    Result Date: 4/22/2019  Stable examination with no evidence of metastatic or recurrent disease. There are no changes in the interval. No active or recurrent lymphoma.  D:  04/22/2019 E:  04/22/2019      Lab Results   Component Value Date    HGB 13.9 06/23/2021    HCT 42.7 06/23/2021    MCV 92.2 06/23/2021     06/23/2021    WBC 6.40 06/23/2021    NEUTROABS 4.80 06/23/2021    LYMPHSABS 1.10 06/23/2021    MONOSABS 0.50 06/23/2021    EOSABS 0.14 04/22/2019    BASOSABS 0.02 04/22/2019     Lab Results   Component Value Date    GLUCOSE 95 06/23/2021    BUN 16 06/23/2021    CREATININE 0.87 06/23/2021     06/23/2021    K 4.8 06/23/2021     06/23/2021    CO2 30.0 (H) 06/23/2021    CALCIUM 9.9 06/23/2021    PROTEINTOT 6.4 06/23/2021    ALBUMIN 4.30 06/23/2021    BILITOT 0.3 06/23/2021    ALKPHOS 58 06/23/2021    AST 19 06/23/2021    ALT 12 06/23/2021       Lab Results   Component Value Date     (L) 06/23/2021     06/22/2020     (L) 04/22/2019     10/27/2016         Assessment/Plan    1.  Stage III diffuse large B-cell lymphoma.  No clinical sign of recurrence.  She is almost 5 years out from diagnosis.  She last had scans 4/2019 that showed no evidence of recurrence.  Labs obtained today were unremarkable.  At this point, we will see patient on an as needed basis.  She will notify us with any issues or concerns otherwise she will continue  close follow up with her PCP.    I spent 20 minutes caring for Katie on this date of service. This time includes time spent by me in the following activities: preparing for the visit, performing a medically appropriate examination and/or evaluation, counseling and educating the patient/family/caregiver and documenting information in the medical record.         TOÑO Philippe  [facility]    6/23/2021     Please note that portions of this note may have been completed with a voice recognition program. Efforts were made to edit the dictations, but occasionally words are mistranscribed.

## 2022-04-20 ENCOUNTER — TELEPHONE (OUTPATIENT)
Dept: FAMILY MEDICINE CLINIC | Facility: CLINIC | Age: 87
End: 2022-04-20

## 2022-07-09 RX ORDER — METOPROLOL SUCCINATE 50 MG/1
50 TABLET, EXTENDED RELEASE ORAL DAILY
Qty: 90 TABLET | Refills: 0 | Status: SHIPPED | OUTPATIENT
Start: 2022-07-09 | End: 2022-09-26 | Stop reason: SDUPTHER

## 2022-08-09 ENCOUNTER — TELEPHONE (OUTPATIENT)
Dept: FAMILY MEDICINE CLINIC | Facility: CLINIC | Age: 87
End: 2022-08-09

## 2022-08-09 DIAGNOSIS — E55.9 VITAMIN D DEFICIENCY: ICD-10-CM

## 2022-08-09 DIAGNOSIS — I10 PRIMARY HYPERTENSION: Primary | ICD-10-CM

## 2022-08-09 NOTE — TELEPHONE ENCOUNTER
Patient is scheduled for a 6 month follow up, she wanted labs prior to appointment. Please place lab orders

## 2022-09-19 ENCOUNTER — LAB (OUTPATIENT)
Dept: FAMILY MEDICINE CLINIC | Facility: CLINIC | Age: 87
End: 2022-09-19

## 2022-09-19 DIAGNOSIS — E55.9 VITAMIN D DEFICIENCY: ICD-10-CM

## 2022-09-19 DIAGNOSIS — I10 PRIMARY HYPERTENSION: ICD-10-CM

## 2022-09-19 PROCEDURE — 36415 COLL VENOUS BLD VENIPUNCTURE: CPT | Performed by: FAMILY MEDICINE

## 2022-09-20 LAB
25(OH)D3+25(OH)D2 SERPL-MCNC: 31.9 NG/ML (ref 30–100)
ALBUMIN SERPL-MCNC: 4.2 G/DL (ref 3.6–4.6)
ALBUMIN/GLOB SERPL: 2 {RATIO} (ref 1.2–2.2)
ALP SERPL-CCNC: 57 IU/L (ref 44–121)
ALT SERPL-CCNC: 13 IU/L (ref 0–32)
AST SERPL-CCNC: 23 IU/L (ref 0–40)
BASOPHILS # BLD AUTO: 0 X10E3/UL (ref 0–0.2)
BASOPHILS NFR BLD AUTO: 1 %
BILIRUB SERPL-MCNC: 0.4 MG/DL (ref 0–1.2)
BUN SERPL-MCNC: 18 MG/DL (ref 8–27)
BUN/CREAT SERPL: 18 (ref 12–28)
CALCIUM SERPL-MCNC: 9.1 MG/DL (ref 8.7–10.3)
CHLORIDE SERPL-SCNC: 103 MMOL/L (ref 96–106)
CHOLEST SERPL-MCNC: 179 MG/DL (ref 100–199)
CO2 SERPL-SCNC: 26 MMOL/L (ref 20–29)
CREAT SERPL-MCNC: 0.98 MG/DL (ref 0.57–1)
EGFRCR SERPLBLD CKD-EPI 2021: 57 ML/MIN/1.73
EOSINOPHIL # BLD AUTO: 0.2 X10E3/UL (ref 0–0.4)
EOSINOPHIL NFR BLD AUTO: 4 %
ERYTHROCYTE [DISTWIDTH] IN BLOOD BY AUTOMATED COUNT: 12.7 % (ref 11.7–15.4)
GLOBULIN SER CALC-MCNC: 2.1 G/DL (ref 1.5–4.5)
GLUCOSE SERPL-MCNC: 93 MG/DL (ref 65–99)
HCT VFR BLD AUTO: 41.4 % (ref 34–46.6)
HDLC SERPL-MCNC: 56 MG/DL
HGB BLD-MCNC: 13.4 G/DL (ref 11.1–15.9)
IMM GRANULOCYTES # BLD AUTO: 0 X10E3/UL (ref 0–0.1)
IMM GRANULOCYTES NFR BLD AUTO: 0 %
LDLC SERPL CALC-MCNC: 111 MG/DL (ref 0–99)
LYMPHOCYTES # BLD AUTO: 1.3 X10E3/UL (ref 0.7–3.1)
LYMPHOCYTES NFR BLD AUTO: 24 %
MCH RBC QN AUTO: 29.8 PG (ref 26.6–33)
MCHC RBC AUTO-ENTMCNC: 32.4 G/DL (ref 31.5–35.7)
MCV RBC AUTO: 92 FL (ref 79–97)
MONOCYTES # BLD AUTO: 0.4 X10E3/UL (ref 0.1–0.9)
MONOCYTES NFR BLD AUTO: 8 %
NEUTROPHILS # BLD AUTO: 3.6 X10E3/UL (ref 1.4–7)
NEUTROPHILS NFR BLD AUTO: 63 %
PLATELET # BLD AUTO: 178 X10E3/UL (ref 150–450)
POTASSIUM SERPL-SCNC: 4.4 MMOL/L (ref 3.5–5.2)
PROT SERPL-MCNC: 6.3 G/DL (ref 6–8.5)
RBC # BLD AUTO: 4.49 X10E6/UL (ref 3.77–5.28)
SODIUM SERPL-SCNC: 142 MMOL/L (ref 134–144)
TRIGL SERPL-MCNC: 64 MG/DL (ref 0–149)
TSH SERPL DL<=0.005 MIU/L-ACNC: 7.01 UIU/ML (ref 0.45–4.5)
VLDLC SERPL CALC-MCNC: 12 MG/DL (ref 5–40)
WBC # BLD AUTO: 5.5 X10E3/UL (ref 3.4–10.8)

## 2022-09-26 ENCOUNTER — OFFICE VISIT (OUTPATIENT)
Dept: FAMILY MEDICINE CLINIC | Facility: CLINIC | Age: 87
End: 2022-09-26

## 2022-09-26 VITALS
HEART RATE: 90 BPM | HEIGHT: 63 IN | BODY MASS INDEX: 23.74 KG/M2 | DIASTOLIC BLOOD PRESSURE: 76 MMHG | SYSTOLIC BLOOD PRESSURE: 142 MMHG | WEIGHT: 134 LBS

## 2022-09-26 DIAGNOSIS — I10 PRIMARY HYPERTENSION: ICD-10-CM

## 2022-09-26 DIAGNOSIS — G25.81 RESTLESS LEG SYNDROME: ICD-10-CM

## 2022-09-26 DIAGNOSIS — E03.9 ACQUIRED HYPOTHYROIDISM: Primary | ICD-10-CM

## 2022-09-26 DIAGNOSIS — F51.01 PRIMARY INSOMNIA: ICD-10-CM

## 2022-09-26 PROCEDURE — 99214 OFFICE O/P EST MOD 30 MIN: CPT | Performed by: FAMILY MEDICINE

## 2022-09-26 RX ORDER — LEVOTHYROXINE SODIUM 88 MCG
88 TABLET ORAL DAILY
Qty: 60 TABLET | Refills: 0 | Status: SHIPPED | OUTPATIENT
Start: 2022-09-26 | End: 2022-11-16 | Stop reason: SDUPTHER

## 2022-09-26 RX ORDER — LEVOTHYROXINE SODIUM 75 MCG
75 TABLET ORAL EVERY MORNING
COMMUNITY
Start: 2022-06-21 | End: 2022-09-26

## 2022-09-26 RX ORDER — METOPROLOL SUCCINATE 50 MG/1
50 TABLET, EXTENDED RELEASE ORAL DAILY
Qty: 90 TABLET | Refills: 1 | Status: SHIPPED | OUTPATIENT
Start: 2022-09-26 | End: 2023-03-27 | Stop reason: SDUPTHER

## 2022-09-26 RX ORDER — DOXEPIN HYDROCHLORIDE 10 MG/1
CAPSULE ORAL
Qty: 180 CAPSULE | Refills: 1 | Status: SHIPPED | OUTPATIENT
Start: 2022-09-26

## 2022-09-26 RX ORDER — DOXEPIN HYDROCHLORIDE 10 MG/1
CAPSULE ORAL
COMMUNITY
Start: 2022-08-01 | End: 2022-09-26 | Stop reason: SDUPTHER

## 2022-09-27 NOTE — PROGRESS NOTES
Follow Up Office Visit      Date of Visit:  2022   Patient Name: Katie Vaughn  : 1935   MRN: [id number]     Chief Complaint:    Chief Complaint   Patient presents with   • Hypothyroidism   • Med Refill     Pt is here for a medication recheck.    • Hypertension   • Leg Pain     C/o leg pain worse at night.        History of Present Illness: Katie Vaughn is a 86 y.o. female who is here today for follow up.  Patient comes in today for review of her chronic medical conditions.  Overall patient doing well.  Blood work reviewed and was normal.  She is having some restless leg symptoms.  We talked about medications.        Subjective      Review of Systems:   Review of Systems   Constitutional: Negative for fatigue and fever.   HENT: Negative for congestion and ear pain.    Respiratory: Negative for apnea, cough, chest tightness and shortness of breath.    Cardiovascular: Negative for chest pain.   Gastrointestinal: Negative for abdominal pain, constipation, diarrhea and nausea.   Musculoskeletal: Negative for arthralgias.   Psychiatric/Behavioral: Negative for depressed mood and stress.       Past Medical History:   Past Medical History:   Diagnosis Date   • Anxiety    • Cataract    • Disease of thyroid gland    • Diverticulitis    • Ovarian cancer (HCC)    • Positive TB test        Past Surgical History:   Past Surgical History:   Procedure Laterality Date   • APPENDECTOMY     • CHOLECYSTECTOMY     • HYSTERECTOMY         Family History:   Family History   Problem Relation Age of Onset   • Lung cancer Mother    • Colon cancer Maternal Uncle    • Colon cancer Paternal Aunt        Social History:   Social History     Socioeconomic History   • Marital status:    Tobacco Use   • Smoking status: Former Smoker     Packs/day: 1.00     Years: 15.00     Pack years: 15.00     Types: Cigarettes     Quit date:      Years since quittin.7   • Smokeless tobacco: Never Used   Vaping Use   • Vaping  "Use: Never used   Substance and Sexual Activity   • Alcohol use: No   • Drug use: No   • Sexual activity: Defer       Medications:     Current Outpatient Medications:   •  dicyclomine (BENTYL) 10 MG capsule, Take 10 mg by mouth As Needed., Disp: , Rfl:   •  diphenhydrAMINE (BENADRYL) 25 mg capsule, Take 25 mg by mouth Every 6 (Six) Hours As Needed for itching., Disp: , Rfl:   •  doxepin (SINEquan) 10 MG capsule, 1-2 po qhs prn, Disp: 180 capsule, Rfl: 1  •  metoprolol succinate XL (TOPROL-XL) 50 MG 24 hr tablet, Take 1 tablet by mouth Daily. Leave on file, Disp: 90 tablet, Rfl: 1  •  LORazepam (ATIVAN) 0.5 MG tablet, Take 0.5 mg by mouth Daily., Disp: , Rfl:   •  Synthroid 88 MCG tablet, Take 1 tablet by mouth Daily. Give name brand, Disp: 60 tablet, Rfl: 0    Allergies:   Allergies   Allergen Reactions   • Latex Itching, Dermatitis and Rash   • Betadine [Povidone Iodine] Itching     Pt says her skin also blistered   • Sulfa Antibiotics Swelling   • Penicillins Rash       Objective     Physical Exam:  Vital Signs:   Vitals:    09/26/22 1451   BP: 142/76   Pulse: 90   Weight: 60.8 kg (134 lb)   Height: 160 cm (63\")     Body mass index is 23.74 kg/m².     Physical Exam  Vitals and nursing note reviewed.   Constitutional:       General: She is not in acute distress.     Appearance: Normal appearance. She is not ill-appearing.   HENT:      Head: Normocephalic and atraumatic.      Right Ear: Tympanic membrane and ear canal normal.      Left Ear: Tympanic membrane and ear canal normal.      Nose: Nose normal.   Cardiovascular:      Rate and Rhythm: Normal rate and regular rhythm.      Heart sounds: Normal heart sounds.   Pulmonary:      Effort: Pulmonary effort is normal.      Breath sounds: Normal breath sounds.   Neurological:      Mental Status: She is alert and oriented to person, place, and time. Mental status is at baseline.   Psychiatric:         Mood and Affect: Mood normal.         Procedures      Assessment / " Plan      Assessment/Plan:   Diagnoses and all orders for this visit:    1. Acquired hypothyroidism (Primary)  -     TSH; Future    2. Primary insomnia    3. Primary hypertension    4. Restless leg syndrome    Other orders  -     Synthroid 88 MCG tablet; Take 1 tablet by mouth Daily. Give name brand  Dispense: 60 tablet; Refill: 0  -     doxepin (SINEquan) 10 MG capsule; 1-2 po qhs prn  Dispense: 180 capsule; Refill: 1  -     metoprolol succinate XL (TOPROL-XL) 50 MG 24 hr tablet; Take 1 tablet by mouth Daily. Leave on file  Dispense: 90 tablet; Refill: 1         Continue all current medications.  Overall doing well.  Had labs with patient today.  No changes in current treatment.  She does not want to use medication for the restless leg syndrome currently.    Follow Up:   Return in about 6 months (around 3/26/2023) for Recheck.    Dmitry Nicole  [facility] [place]

## 2022-11-01 ENCOUNTER — LAB (OUTPATIENT)
Dept: FAMILY MEDICINE CLINIC | Facility: CLINIC | Age: 87
End: 2022-11-01

## 2022-11-01 DIAGNOSIS — E03.9 ACQUIRED HYPOTHYROIDISM: ICD-10-CM

## 2022-11-01 PROCEDURE — 36415 COLL VENOUS BLD VENIPUNCTURE: CPT | Performed by: FAMILY MEDICINE

## 2022-11-02 LAB — TSH SERPL DL<=0.005 MIU/L-ACNC: 2.06 UIU/ML (ref 0.45–4.5)

## 2022-11-16 ENCOUNTER — TELEPHONE (OUTPATIENT)
Dept: PEDIATRICS | Facility: OTHER | Age: 87
End: 2022-11-16

## 2022-11-16 RX ORDER — LEVOTHYROXINE SODIUM 88 MCG
88 TABLET ORAL DAILY
Qty: 90 TABLET | Refills: 1 | Status: SHIPPED | OUTPATIENT
Start: 2022-11-16 | End: 2023-03-27 | Stop reason: SDUPTHER

## 2022-11-16 NOTE — TELEPHONE ENCOUNTER
Caller: Katie Vaughn    Relationship: Self    Best call back number: [phone]    Caller requesting test results: PATIENT     What test was performed: BLOOD WORK FOR THYROID     When was the test performed: 11/1/22    Where was the test performed: IN OFFICE     Additional notes: PATIENT IS REQUESTING A CALLBACK WITH TEST RESULTS.

## 2022-11-16 NOTE — TELEPHONE ENCOUNTER
HUB TO READ     Left VM to return call. Please let patient know that her thyroid test result was normal.

## 2023-03-21 ENCOUNTER — LAB (OUTPATIENT)
Dept: FAMILY MEDICINE CLINIC | Facility: CLINIC | Age: 88
End: 2023-03-21
Payer: MEDICARE

## 2023-03-21 DIAGNOSIS — E03.9 ACQUIRED HYPOTHYROIDISM: ICD-10-CM

## 2023-03-21 DIAGNOSIS — E03.9 ACQUIRED HYPOTHYROIDISM: Primary | ICD-10-CM

## 2023-03-21 DIAGNOSIS — E55.9 VITAMIN D DEFICIENCY: ICD-10-CM

## 2023-03-21 DIAGNOSIS — I10 PRIMARY HYPERTENSION: ICD-10-CM

## 2023-03-21 PROCEDURE — 36415 COLL VENOUS BLD VENIPUNCTURE: CPT | Performed by: FAMILY MEDICINE

## 2023-03-22 LAB
25(OH)D3+25(OH)D2 SERPL-MCNC: 25.7 NG/ML (ref 30–100)
ALBUMIN SERPL-MCNC: 4.2 G/DL (ref 3.6–4.6)
ALBUMIN/GLOB SERPL: 2.1 {RATIO} (ref 1.2–2.2)
ALP SERPL-CCNC: 56 IU/L (ref 44–121)
ALT SERPL-CCNC: 9 IU/L (ref 0–32)
AST SERPL-CCNC: 18 IU/L (ref 0–40)
BASOPHILS # BLD AUTO: 0 X10E3/UL (ref 0–0.2)
BASOPHILS NFR BLD AUTO: 1 %
BILIRUB SERPL-MCNC: 0.6 MG/DL (ref 0–1.2)
BUN SERPL-MCNC: 17 MG/DL (ref 8–27)
BUN/CREAT SERPL: 15 (ref 12–28)
CALCIUM SERPL-MCNC: 9 MG/DL (ref 8.7–10.3)
CHLORIDE SERPL-SCNC: 104 MMOL/L (ref 96–106)
CHOLEST SERPL-MCNC: 172 MG/DL (ref 100–199)
CO2 SERPL-SCNC: 26 MMOL/L (ref 20–29)
CREAT SERPL-MCNC: 1.11 MG/DL (ref 0.57–1)
EGFRCR SERPLBLD CKD-EPI 2021: 48 ML/MIN/1.73
EOSINOPHIL # BLD AUTO: 0.2 X10E3/UL (ref 0–0.4)
EOSINOPHIL NFR BLD AUTO: 4 %
ERYTHROCYTE [DISTWIDTH] IN BLOOD BY AUTOMATED COUNT: 12.7 % (ref 11.7–15.4)
GLOBULIN SER CALC-MCNC: 2 G/DL (ref 1.5–4.5)
GLUCOSE SERPL-MCNC: 96 MG/DL (ref 70–99)
HCT VFR BLD AUTO: 42 % (ref 34–46.6)
HDLC SERPL-MCNC: 50 MG/DL
HGB BLD-MCNC: 14 G/DL (ref 11.1–15.9)
IMM GRANULOCYTES # BLD AUTO: 0 X10E3/UL (ref 0–0.1)
IMM GRANULOCYTES NFR BLD AUTO: 1 %
LDLC SERPL CALC-MCNC: 110 MG/DL (ref 0–99)
LYMPHOCYTES # BLD AUTO: 1 X10E3/UL (ref 0.7–3.1)
LYMPHOCYTES NFR BLD AUTO: 21 %
MCH RBC QN AUTO: 29.9 PG (ref 26.6–33)
MCHC RBC AUTO-ENTMCNC: 33.3 G/DL (ref 31.5–35.7)
MCV RBC AUTO: 90 FL (ref 79–97)
MONOCYTES # BLD AUTO: 0.4 X10E3/UL (ref 0.1–0.9)
MONOCYTES NFR BLD AUTO: 8 %
NEUTROPHILS # BLD AUTO: 3.2 X10E3/UL (ref 1.4–7)
NEUTROPHILS NFR BLD AUTO: 65 %
PLATELET # BLD AUTO: 180 X10E3/UL (ref 150–450)
POTASSIUM SERPL-SCNC: 4.3 MMOL/L (ref 3.5–5.2)
PROT SERPL-MCNC: 6.2 G/DL (ref 6–8.5)
RBC # BLD AUTO: 4.69 X10E6/UL (ref 3.77–5.28)
SODIUM SERPL-SCNC: 143 MMOL/L (ref 134–144)
TRIGL SERPL-MCNC: 62 MG/DL (ref 0–149)
TSH SERPL DL<=0.005 MIU/L-ACNC: 0.95 UIU/ML (ref 0.45–4.5)
VLDLC SERPL CALC-MCNC: 12 MG/DL (ref 5–40)
WBC # BLD AUTO: 4.9 X10E3/UL (ref 3.4–10.8)

## 2023-03-27 ENCOUNTER — OFFICE VISIT (OUTPATIENT)
Dept: FAMILY MEDICINE CLINIC | Facility: CLINIC | Age: 88
End: 2023-03-27
Payer: MEDICARE

## 2023-03-27 VITALS
DIASTOLIC BLOOD PRESSURE: 76 MMHG | HEART RATE: 80 BPM | OXYGEN SATURATION: 96 % | SYSTOLIC BLOOD PRESSURE: 130 MMHG | WEIGHT: 131 LBS | HEIGHT: 63 IN | BODY MASS INDEX: 23.21 KG/M2

## 2023-03-27 DIAGNOSIS — E03.9 ACQUIRED HYPOTHYROIDISM: Primary | ICD-10-CM

## 2023-03-27 DIAGNOSIS — I10 PRIMARY HYPERTENSION: ICD-10-CM

## 2023-03-27 DIAGNOSIS — N39.41 URGE INCONTINENCE: ICD-10-CM

## 2023-03-27 DIAGNOSIS — K58.9 IRRITABLE BOWEL SYNDROME, UNSPECIFIED TYPE: ICD-10-CM

## 2023-03-27 DIAGNOSIS — F51.01 PRIMARY INSOMNIA: ICD-10-CM

## 2023-03-27 LAB
BILIRUB BLD-MCNC: NEGATIVE MG/DL
CLARITY, POC: CLEAR
COLOR UR: YELLOW
EXPIRATION DATE: ABNORMAL
GLUCOSE UR STRIP-MCNC: NEGATIVE MG/DL
KETONES UR QL: NEGATIVE
LEUKOCYTE EST, POC: NEGATIVE
Lab: ABNORMAL
NITRITE UR-MCNC: NEGATIVE MG/ML
PH UR: 6 [PH] (ref 5–8)
PROT UR STRIP-MCNC: NEGATIVE MG/DL
RBC # UR STRIP: ABNORMAL /UL
SP GR UR: 1.01 (ref 1–1.03)
UROBILINOGEN UR QL: NORMAL

## 2023-03-27 PROCEDURE — 99214 OFFICE O/P EST MOD 30 MIN: CPT | Performed by: FAMILY MEDICINE

## 2023-03-27 PROCEDURE — 81003 URINALYSIS AUTO W/O SCOPE: CPT | Performed by: FAMILY MEDICINE

## 2023-03-27 RX ORDER — DICYCLOMINE HYDROCHLORIDE 10 MG/1
10 CAPSULE ORAL
Qty: 90 CAPSULE | Refills: 1 | Status: SHIPPED | OUTPATIENT
Start: 2023-03-27

## 2023-03-27 RX ORDER — METOPROLOL SUCCINATE 50 MG/1
50 TABLET, EXTENDED RELEASE ORAL DAILY
Qty: 90 TABLET | Refills: 1 | Status: SHIPPED | OUTPATIENT
Start: 2023-03-27

## 2023-03-27 RX ORDER — LEVOTHYROXINE SODIUM 88 MCG
88 TABLET ORAL DAILY
Qty: 90 TABLET | Refills: 1 | Status: SHIPPED | OUTPATIENT
Start: 2023-03-27

## 2023-03-28 NOTE — PROGRESS NOTES
Follow Up Office Visit      Date of Visit:  2023   Patient Name: Katie Vaughn  : 1935   MRN: [id number]     Chief Complaint:    Chief Complaint   Patient presents with   • go over labs   • Urinary Tract Infection       History of Present Illness: Katie Vaughn is a 87 y.o. female who is here today for follow up.  Patient comes in today to follow-up on her hypertension hypothyroidism insomnia and IBS.  Conditions overall controlled.  Needing refills on medications.        Subjective      Review of Systems:   Review of Systems   Constitutional: Negative for fatigue and fever.   HENT: Negative for congestion and ear pain.    Respiratory: Negative for apnea, cough, chest tightness and shortness of breath.    Cardiovascular: Negative for chest pain.   Gastrointestinal: Negative for abdominal pain, constipation, diarrhea and nausea.   Musculoskeletal: Negative for arthralgias.   Psychiatric/Behavioral: Negative for depressed mood and stress.       Past Medical History:   Past Medical History:   Diagnosis Date   • Anxiety    • Cataract    • Disease of thyroid gland    • Diverticulitis    • Ovarian cancer (HCC)    • Positive TB test        Past Surgical History:   Past Surgical History:   Procedure Laterality Date   • APPENDECTOMY     • CHOLECYSTECTOMY     • HYSTERECTOMY         Family History:   Family History   Problem Relation Age of Onset   • Lung cancer Mother    • Colon cancer Maternal Uncle    • Colon cancer Paternal Aunt        Social History:   Social History     Socioeconomic History   • Marital status:    Tobacco Use   • Smoking status: Former     Packs/day: 1.00     Years: 15.00     Pack years: 15.00     Types: Cigarettes     Quit date:      Years since quittin.2   • Smokeless tobacco: Never   Vaping Use   • Vaping Use: Never used   Substance and Sexual Activity   • Alcohol use: No   • Drug use: No   • Sexual activity: Defer       Medications:     Current Outpatient  "Medications:   •  dicyclomine (BENTYL) 10 MG capsule, Take 1 capsule by mouth 3 (Three) Times a Day Before Meals., Disp: 90 capsule, Rfl: 1  •  metoprolol succinate XL (TOPROL-XL) 50 MG 24 hr tablet, Take 1 tablet by mouth Daily. Leave on file, Disp: 90 tablet, Rfl: 1  •  Synthroid 88 MCG tablet, Take 1 tablet by mouth Daily. Give name brand, Disp: 90 tablet, Rfl: 1  •  diphenhydrAMINE (BENADRYL) 25 mg capsule, Take 25 mg by mouth Every 6 (Six) Hours As Needed for itching., Disp: , Rfl:   •  doxepin (SINEquan) 10 MG capsule, 1-2 po qhs prn, Disp: 180 capsule, Rfl: 1  •  LORazepam (ATIVAN) 0.5 MG tablet, Take 0.5 mg by mouth Daily., Disp: , Rfl:     Allergies:   Allergies   Allergen Reactions   • Latex Itching, Dermatitis and Rash   • Betadine [Povidone Iodine] Itching     Pt says her skin also blistered   • Sulfa Antibiotics Swelling   • Penicillins Rash       Objective     Physical Exam:  Vital Signs:   Vitals:    03/27/23 1332   BP: 130/76   Pulse: 80   SpO2: 96%   Weight: 59.4 kg (131 lb)   Height: 160 cm (63\")     Body mass index is 23.21 kg/m².     Physical Exam  Vitals and nursing note reviewed.   Constitutional:       General: She is not in acute distress.     Appearance: Normal appearance. She is not ill-appearing.   HENT:      Head: Normocephalic and atraumatic.      Right Ear: Tympanic membrane and ear canal normal.      Left Ear: Tympanic membrane and ear canal normal.      Nose: Nose normal.   Cardiovascular:      Rate and Rhythm: Normal rate and regular rhythm.      Heart sounds: Normal heart sounds.   Pulmonary:      Effort: Pulmonary effort is normal.      Breath sounds: Normal breath sounds.   Neurological:      Mental Status: She is alert and oriented to person, place, and time. Mental status is at baseline.   Psychiatric:         Mood and Affect: Mood normal.         Procedures      Assessment / Plan      Assessment/Plan:   Diagnoses and all orders for this visit:    1. Acquired hypothyroidism " (Primary)  -     Synthroid 88 MCG tablet; Take 1 tablet by mouth Daily. Give name brand  Dispense: 90 tablet; Refill: 1    2. Primary hypertension  -     metoprolol succinate XL (TOPROL-XL) 50 MG 24 hr tablet; Take 1 tablet by mouth Daily. Leave on file  Dispense: 90 tablet; Refill: 1    3. Urge incontinence  -     POCT urinalysis dipstick, automated  -     Urine Culture - Urine, Urine, Clean Catch    4. Irritable bowel syndrome, unspecified type  -     dicyclomine (BENTYL) 10 MG capsule; Take 1 capsule by mouth 3 (Three) Times a Day Before Meals.  Dispense: 90 capsule; Refill: 1    5. Primary insomnia         Continue current medication for her thyroid.  Refilled medication for IBS.  Start Myrbetriq samples for her urge incontinence.  New current medications for hypertension.    Follow Up:   No follow-ups on file.    Dmitry Nicole  [facility] [place]

## 2023-03-30 ENCOUNTER — TELEPHONE (OUTPATIENT)
Dept: FAMILY MEDICINE CLINIC | Facility: CLINIC | Age: 88
End: 2023-03-30
Payer: MEDICARE

## 2023-03-30 LAB
BACTERIA UR CULT: NORMAL
BACTERIA UR CULT: NORMAL

## 2023-03-30 NOTE — TELEPHONE ENCOUNTER
Caller: Katie Vaughn    Relationship: Self    Best call back number: [phone]    What test was performed: LAB    When was the test performed:MONDAY    Where was the test performed: OFFICE

## 2023-04-02 NOTE — TELEPHONE ENCOUNTER
There is no signs of a urine infection on the urinalysis.  Her blood counts are completely normal.  No anemia.  White blood cell count is not elevated to signify infection.  Vitamin D level is a little bit low.  She can take over-the-counter vitamin D.  Cholesterol numbers are stable.  No issue of concern.  Thyroid normal range.  Liver function and kidney function are stable.  Sugar stable.

## 2023-09-07 ENCOUNTER — TELEPHONE (OUTPATIENT)
Dept: FAMILY MEDICINE CLINIC | Facility: CLINIC | Age: 88
End: 2023-09-07
Payer: MEDICARE

## 2023-09-07 NOTE — TELEPHONE ENCOUNTER
Caller: Katie Vaughn    Relationship to patient: Self    Best call back number: [phone]     Chief complaint:      Type of visit: A WEEK BEFORE 092723    Requested date: before 092723

## 2023-09-20 ENCOUNTER — LAB (OUTPATIENT)
Dept: FAMILY MEDICINE CLINIC | Facility: CLINIC | Age: 88
End: 2023-09-20
Payer: MEDICARE

## 2023-09-27 ENCOUNTER — OFFICE VISIT (OUTPATIENT)
Dept: FAMILY MEDICINE CLINIC | Facility: CLINIC | Age: 88
End: 2023-09-27
Payer: MEDICARE

## 2023-09-27 VITALS
HEART RATE: 86 BPM | DIASTOLIC BLOOD PRESSURE: 70 MMHG | WEIGHT: 125 LBS | HEIGHT: 63 IN | OXYGEN SATURATION: 96 % | BODY MASS INDEX: 22.15 KG/M2 | SYSTOLIC BLOOD PRESSURE: 148 MMHG

## 2023-09-27 DIAGNOSIS — E03.9 ACQUIRED HYPOTHYROIDISM: ICD-10-CM

## 2023-09-27 DIAGNOSIS — R41.89 COGNITIVE DECLINE: Primary | ICD-10-CM

## 2023-09-27 DIAGNOSIS — F41.9 ANXIETY: ICD-10-CM

## 2023-09-27 DIAGNOSIS — Z23 IMMUNIZATION DUE: ICD-10-CM

## 2023-09-27 PROCEDURE — 99214 OFFICE O/P EST MOD 30 MIN: CPT | Performed by: FAMILY MEDICINE

## 2023-09-27 PROCEDURE — 90662 IIV NO PRSV INCREASED AG IM: CPT | Performed by: FAMILY MEDICINE

## 2023-09-27 PROCEDURE — G0008 ADMIN INFLUENZA VIRUS VAC: HCPCS | Performed by: FAMILY MEDICINE

## 2023-09-27 RX ORDER — LEVOTHYROXINE SODIUM 88 MCG
88 TABLET ORAL DAILY
Qty: 90 TABLET | Refills: 1 | Status: SHIPPED | OUTPATIENT
Start: 2023-09-27

## 2023-09-27 RX ORDER — SERTRALINE HYDROCHLORIDE 25 MG/1
25 TABLET, FILM COATED ORAL DAILY
Qty: 30 TABLET | Refills: 2 | Status: SHIPPED | OUTPATIENT
Start: 2023-09-27

## 2023-09-28 ENCOUNTER — TELEPHONE (OUTPATIENT)
Dept: FAMILY MEDICINE CLINIC | Facility: CLINIC | Age: 88
End: 2023-09-28
Payer: MEDICARE

## 2023-09-28 DIAGNOSIS — I10 PRIMARY HYPERTENSION: ICD-10-CM

## 2023-09-28 NOTE — PROGRESS NOTES
Follow Up Office Visit      Date of Visit:  2023   Patient Name: Katie Vaughn  : 1935   MRN: [id number]     Chief Complaint:    Chief Complaint   Patient presents with    discuss meds       History of Present Illness: Katie Vaughn is a 87 y.o. female who is here today for follow up.  Patient here today with  and also son.  Patient and family concerned because of some decreased cognition and concentration.  Overall great increase in anxiousness.  The anxiety seems to be the driving force causing a lot of the issues related to cognition and concentration.  Seems to be a long-term problem worsening more recently.  More stressors with her  being sicker over the last year as well as herself aging.  She was only on some as needed medication she would take for anxiety.  We did also review her most recent labs which were basically stable.  She does need refills on some of her basic medication.        Subjective      Review of Systems:   Review of Systems   Constitutional:  Negative for fatigue and fever.   HENT:  Negative for congestion and ear pain.    Respiratory:  Negative for apnea, cough, chest tightness and shortness of breath.    Cardiovascular:  Negative for chest pain.   Gastrointestinal:  Negative for abdominal pain, constipation, diarrhea and nausea.   Musculoskeletal:  Negative for arthralgias.   Neurological:  Positive for memory problem and confusion.   Psychiatric/Behavioral:  Negative for depressed mood and stress. The patient is nervous/anxious.      Past Medical History:   Past Medical History:   Diagnosis Date    Anxiety     Cataract     Disease of thyroid gland     Diverticulitis     Ovarian cancer     Positive TB test        Past Surgical History:   Past Surgical History:   Procedure Laterality Date    APPENDECTOMY      CHOLECYSTECTOMY      HYSTERECTOMY         Family History:   Family History   Problem Relation Age of Onset    Lung cancer Mother     Colon cancer  "Maternal Uncle     Colon cancer Paternal Aunt        Social History:   Social History     Socioeconomic History    Marital status:    Tobacco Use    Smoking status: Former     Packs/day: 1.00     Years: 15.00     Pack years: 15.00     Types: Cigarettes     Quit date:      Years since quittin.7    Smokeless tobacco: Never   Vaping Use    Vaping Use: Never used   Substance and Sexual Activity    Alcohol use: No    Drug use: No    Sexual activity: Defer       Medications:     Current Outpatient Medications:     dicyclomine (BENTYL) 10 MG capsule, Take 1 capsule by mouth 3 (Three) Times a Day Before Meals., Disp: 90 capsule, Rfl: 1    diphenhydrAMINE (BENADRYL) 25 mg capsule, Take 1 capsule by mouth Every 6 (Six) Hours As Needed for Itching., Disp: , Rfl:     metoprolol succinate XL (TOPROL-XL) 50 MG 24 hr tablet, Take 1 tablet by mouth Daily. Leave on file, Disp: 90 tablet, Rfl: 1    Synthroid 88 MCG tablet, Take 1 tablet by mouth Daily. Give name brand, Disp: 90 tablet, Rfl: 1    LORazepam (ATIVAN) 0.5 MG tablet, Take 0.5 mg by mouth Daily. (Patient not taking: Reported on 2023), Disp: , Rfl:     sertraline (Zoloft) 25 MG tablet, Take 1 tablet by mouth Daily., Disp: 30 tablet, Rfl: 2    Allergies:   Allergies   Allergen Reactions    Latex Itching, Dermatitis and Rash    Betadine [Povidone Iodine] Itching     Pt says her skin also blistered    Sulfa Antibiotics Swelling    Penicillins Rash       Objective     Physical Exam:  Vital Signs:   Vitals:    23 1347   BP: 148/70   Pulse: 86   SpO2: 96%   Weight: 56.7 kg (125 lb)   Height: 160 cm (63\")     Body mass index is 22.14 kg/m².     Physical Exam  Vitals and nursing note reviewed.   Constitutional:       General: She is not in acute distress.     Appearance: Normal appearance. She is not ill-appearing.   HENT:      Head: Normocephalic and atraumatic.      Right Ear: Tympanic membrane and ear canal normal.      Left Ear: Tympanic membrane and " ear canal normal.      Nose: Nose normal.   Cardiovascular:      Rate and Rhythm: Normal rate and regular rhythm.      Heart sounds: Normal heart sounds.   Pulmonary:      Effort: Pulmonary effort is normal.      Breath sounds: Normal breath sounds.   Neurological:      Mental Status: She is alert and oriented to person, place, and time. Mental status is at baseline.   Psychiatric:         Mood and Affect: Mood normal.       Procedures      Assessment / Plan      Assessment/Plan:   Diagnoses and all orders for this visit:    1. Cognitive decline (Primary)    2. Acquired hypothyroidism  -     Synthroid 88 MCG tablet; Take 1 tablet by mouth Daily. Give name brand  Dispense: 90 tablet; Refill: 1    3. Immunization due  -     Fluzone High-Dose 65+yrs ([id number])    4. Anxiety  -     sertraline (Zoloft) 25 MG tablet; Take 1 tablet by mouth Daily.  Dispense: 30 tablet; Refill: 2         Mini-Mental status exam score 29 out of 30.  I do feel her symptoms are more related to anxiety causing her cognition issues and concentration issues.  Start a daily medication for anxiety.  Plan on seeing back in about 3 weeks.  We did refill her other medications reviewed labs and gave her flu shot today.    Follow Up:   No follow-ups on file.    Dmitry Nicole  [facility] [place]

## 2023-09-28 NOTE — TELEPHONE ENCOUNTER
Caller: Katie Vaughn    Relationship: Self    Best call back number: [phone]     What is the best time to reach you: ANYTIME     Who are you requesting to speak with (clinical staff, provider,  specific staff member): CLINICAL     Is it okay if the provider responds through MyChart: NO, PLEASE CALL     PATIENT WAS SEEN YESTERDAY 9.27.23 AND FORGOT TO MENTION TO MD ZELAYA AN ISSUE WITH HER KIDNEYS SHE HAD BEEN EXPERIENCING WITH URINATING TOO MUCH. SHE STATES SHE IS NOW BETTER.     PLEASE CALL PATIENT BACK TO DISCUSS, IF NO ANSWER LEAVE A DETAILED MESSAGE.

## 2023-09-28 NOTE — TELEPHONE ENCOUNTER
Caller: Katie Vaughn    Relationship to patient: Self    Best call back number: [phone]    Patient is needing: PATIENT STATED THAT SHE TOOK MELATONIN 10 MG LAST NIGHT WITH SHAKINESS AND HEART FLUTTERS; PATIENT STATED THAT SHE HAS CALMED DOWN THIS MORNING;  LAST NIGHT COULD NOT SLEEP OR SEEM TO RELAX    PATIENT STATED THAT SHE TOOK ANXIETY SERTRALINE 25 MG ABOUT AN HOUR AGO     PLEASE ADVISE ASAP

## 2023-09-29 ENCOUNTER — TELEPHONE (OUTPATIENT)
Dept: FAMILY MEDICINE CLINIC | Facility: CLINIC | Age: 88
End: 2023-09-29

## 2023-09-29 RX ORDER — METOPROLOL SUCCINATE 50 MG/1
TABLET, EXTENDED RELEASE ORAL
Qty: 90 TABLET | Refills: 0 | Status: SHIPPED | OUTPATIENT
Start: 2023-09-29

## 2023-09-29 NOTE — TELEPHONE ENCOUNTER
Caller: Alejandra Vaughn    Relationship: Self    Best call back number: [phone]     What is the best time to reach you: ANYTIME    Who are you requesting to speak with (clinical staff, provider,  specific staff member): DR. ZELAYA    Do you know the name of the person who called: ALEJANDRA    What was the call regarding: PATIENT CALLED TO SEE IF SHE NEED TO TAKE THE MEDICATION AT NIGHT OR IN THE MORNING,     Is it okay if the provider responds through MyChart: NO PLEASE CALL

## 2023-09-29 NOTE — TELEPHONE ENCOUNTER
Is going to take a little bit of time to get the sertraline in her system.  I think we probably have to give it just a little bit more time to see much of a difference.

## 2023-09-30 ENCOUNTER — DOCUMENTATION (OUTPATIENT)
Dept: FAMILY MEDICINE CLINIC | Facility: CLINIC | Age: 88
End: 2023-09-30
Payer: MEDICARE

## 2023-09-30 RX ORDER — TRAZODONE HYDROCHLORIDE 50 MG/1
25 TABLET ORAL NIGHTLY
Qty: 15 TABLET | Refills: 0 | Status: SHIPPED | OUTPATIENT
Start: 2023-09-30

## 2023-09-30 NOTE — PROGRESS NOTES
Date/time of call: 9/30/23   Name of caller: son  Reason for Call: patient not sleeping, having signficicant nausea since strting zoloft 25 MG several days ago.  Nausea is severe and sleep is worse.  Has only been able to take 2-3 doses  Advice/Action taken: stop zoloft, start trazodone. Keep followup with PCP later this month  Followup: as scheduled

## 2023-10-02 ENCOUNTER — TELEPHONE (OUTPATIENT)
Dept: FAMILY MEDICINE CLINIC | Facility: CLINIC | Age: 88
End: 2023-10-02
Payer: MEDICARE

## 2023-10-02 NOTE — TELEPHONE ENCOUNTER
Caller: GILMA CHANCE    Relationship: Child    Best call back number: [phone]    What medications are you currently taking:   Current Outpatient Medications on File Prior to Visit   Medication Sig Dispense Refill    dicyclomine (BENTYL) 10 MG capsule Take 1 capsule by mouth 3 (Three) Times a Day Before Meals. 90 capsule 1    diphenhydrAMINE (BENADRYL) 25 mg capsule Take 1 capsule by mouth Every 6 (Six) Hours As Needed for Itching.      LORazepam (ATIVAN) 0.5 MG tablet Take 0.5 mg by mouth Daily. (Patient not taking: Reported on 9/27/2023)      metoprolol succinate XL (TOPROL-XL) 50 MG 24 hr tablet Take 1 tablet by mouth once daily 90 tablet 0    sertraline (Zoloft) 25 MG tablet Take 1 tablet by mouth Daily. 30 tablet 2    Synthroid 88 MCG tablet Take 1 tablet by mouth Daily. Give name brand 90 tablet 1    traZODone (DESYREL) 50 MG tablet Take 0.5 tablets by mouth Every Night. 15 tablet 0     No current facility-administered medications on file prior to visit.          When did you start taking these medications: LAST WEEK    Which medication are you concerned about: sertraline (Zoloft) 25 MG tablet     Who prescribed you this medication: KEN ZELAYA    What are your concerns: COMPLAINED OF STOMACH ISSUES    How long have you had these concerns: AFTER 2 DOSES   OF MEDICATION.    PATIENT WAS UNABLE TO SLEEP SO SON CALLED ON CALL , THEY STOPPED THE sertraline (Zoloft) 25 MG tablet AND STARTED HER ON traZODone (DESYREL) 50 MG tablet TO HELP HER SLEEP. HE BELIEVES THIS HAS HELPED BUT CAN'T BE CERTAIN DUE TO HER CONDITION.    WANTS TO KNOW YOUR THOUGHTS, DO YOU NEED TO SEE HER, KEEP HER ON SLEEP MEDICATION AND RESTART OR DISPENSE DIFFERENT ANXIETY MEDICATION? HE IS LOOKING FOR DIRECTION.

## 2023-10-03 ENCOUNTER — TELEPHONE (OUTPATIENT)
Dept: FAMILY MEDICINE CLINIC | Facility: CLINIC | Age: 88
End: 2023-10-03
Payer: MEDICARE

## 2023-10-03 NOTE — TELEPHONE ENCOUNTER
Called son. He said he is most concerned about her insomnia. He said she has not slept at all over the course of a few days. On Saturday they called the on call doctor and it was Dr. Corley. He said Dr. Corley sent in trazodone 50mg, but she did tell them to have her D/C the sertraline while doing the trazodone. He said he is not sure if the trazodone helped with insomnia. He is going to have her try it again tonight. He wants to know if she should continue the trazodone? Can she restart the sertraline with the trazodone?

## 2023-10-03 NOTE — TELEPHONE ENCOUNTER
Called son back and waiting for [facility] response for some reason there is multiple messages about the same issue

## 2023-10-03 NOTE — TELEPHONE ENCOUNTER
Son Contacted he said they D/C the sertraline last Saturday ( Dr Corley did ) and they started her on the trazadone but she is still very restless.     What is the suggestion from here?

## 2023-10-04 NOTE — TELEPHONE ENCOUNTER
There are two encounters regarding this. He spoke with me yesterday. Please transfer to [facility] or [facility] TO RELAY:    Dr. Nicole said he would like her to start taking the sertraline and the trazadone is okay to take at bedtime while on the sertraline.

## 2023-10-04 NOTE — TELEPHONE ENCOUNTER
[facility] staff attempted to follow warm transfer process and was unsuccessful     Caller: JAY CHANCE    Relationship to patient: Child    Best call back number: [phone]     Patient is needing: HE IS RETURNING A CALL FROM THIS MORNING.  HE SPOKE WITH BRITTNEY YESTERDAY BUT IS NOT SURE WHO CALLED THIS MORNING.

## 2023-10-04 NOTE — TELEPHONE ENCOUNTER
Name: SARAIJAY RONDON  Relationship: Child  Best Callback Number: [phone]   HUB PROVIDED THE RELAY MESSAGE FROM THE OFFICE  PATIENT {PATIENT VOICED UNDERSTANDING  ADDITIONAL INFORMATION:

## 2023-10-04 NOTE — TELEPHONE ENCOUNTER
Called and M    [facility] TO RELAY:    Dr. Nicole said he would like her to start taking the sertraline and the trazadone is okay to take at bedtime while on the sertraline.

## 2023-10-06 ENCOUNTER — TELEPHONE (OUTPATIENT)
Dept: FAMILY MEDICINE CLINIC | Facility: CLINIC | Age: 88
End: 2023-10-06
Payer: MEDICARE

## 2023-10-06 NOTE — TELEPHONE ENCOUNTER
Caller: JAY CHANCE    Relationship: Child    Best call back number: [phone]     What medications are you currently taking:   Current Outpatient Medications on File Prior to Visit   Medication Sig Dispense Refill    dicyclomine (BENTYL) 10 MG capsule Take 1 capsule by mouth 3 (Three) Times a Day Before Meals. 90 capsule 1    diphenhydrAMINE (BENADRYL) 25 mg capsule Take 1 capsule by mouth Every 6 (Six) Hours As Needed for Itching.      metoprolol succinate XL (TOPROL-XL) 50 MG 24 hr tablet Take 1 tablet by mouth once daily 90 tablet 0    sertraline (Zoloft) 25 MG tablet Take 1 tablet by mouth Daily. 30 tablet 2    Synthroid 88 MCG tablet Take 1 tablet by mouth Daily. Give name brand 90 tablet 1    traZODone (DESYREL) 50 MG tablet Take 0.5 tablets by mouth Every Night. 15 tablet 0     No current facility-administered medications on file prior to visit.          When did you start taking these medications: 09/27/2023 AND 09/30/2023    Which medication are you concerned about:   sertraline (Zoloft) 25 MG tablet     traZODone (DESYREL) 50 MG tablet       Who prescribed you this medication:   KEN ZELAYA MD     What are your concerns:   PATIENT'S (SON) JAY STATED THAT PATIENT HAS DISCONTINUED THAT MEDICATION (SERTRALINE) DUE TO THIS MEDICATION GIVING PATIENT A TERRIBLE UPSET STOMACH AND PATIENT HAS BEEN OFF THIS MEDICATION FOR THE LAST TWO DAYS    JAY STATED THAT THE MEDICATION (TRAZODONE) IS ONLY A HALF A DOSE AND THIS MEDICATION IS MAKING IT HARD FOR THE PATIENT TO SLEEP PATIENT HAS NOT GOT MUCH SLEEP IN THE LAST WEEK  POSSIBLE ONLY 4 HOURS AND SHE FEELING VERY WEAK, TIRED AND EXHAUSTED    JAY WOULD LIKE A CALL BACK REGARDING THIS INFORMATION AND TO BE INFORMED IF THESE MEDICATION CAN BE CHANGED TO DIFFERENT MEDICATION TO HELP THE PATIENT SLEEP AND HELP WITH PATIENT'S ANXIETY

## 2023-10-07 NOTE — TELEPHONE ENCOUNTER
Pt son is calling and is asking if there is a stronger medication to help her sleep. She has not been able to sleep the last week in a half. He is worried about her not sleeping and has tried the on call doctor, with no luck.

## 2023-10-11 ENCOUNTER — OFFICE VISIT (OUTPATIENT)
Dept: FAMILY MEDICINE CLINIC | Facility: CLINIC | Age: 88
End: 2023-10-11
Payer: MEDICARE

## 2023-10-11 VITALS
SYSTOLIC BLOOD PRESSURE: 138 MMHG | WEIGHT: 120.19 LBS | HEIGHT: 63 IN | BODY MASS INDEX: 21.3 KG/M2 | HEART RATE: 103 BPM | DIASTOLIC BLOOD PRESSURE: 86 MMHG | OXYGEN SATURATION: 97 %

## 2023-10-11 DIAGNOSIS — F41.9 ANXIETY: ICD-10-CM

## 2023-10-11 DIAGNOSIS — R41.82 ALTERED MENTAL STATUS, UNSPECIFIED ALTERED MENTAL STATUS TYPE: Primary | ICD-10-CM

## 2023-10-11 PROCEDURE — 99214 OFFICE O/P EST MOD 30 MIN: CPT | Performed by: FAMILY MEDICINE

## 2023-10-11 RX ORDER — CLONAZEPAM 1 MG/1
1 TABLET ORAL 2 TIMES DAILY PRN
Qty: 60 TABLET | Refills: 1 | Status: SHIPPED | OUTPATIENT
Start: 2023-10-11

## 2023-10-11 NOTE — PROGRESS NOTES
Follow Up Office Visit      Date of Visit:  10/11/2023   Patient Name: Katie Vaughn  : 1935   MRN: [id number]     Chief Complaint:    Chief Complaint   Patient presents with    Anxiety       History of Present Illness: Katie Vaughn is a 87 y.o. female who is here today for follow up.  Patient here to follow-up on anxiety and mental status changes.  Prior labs were negative.  Symptoms felt most likely to be due to anxiety.  Patient had around 29 out of 30 on a Mini-Mental status exam.  Anxiety had worsened over the past few months.  Trial of sertraline was given.  Patient had minimal results and actually perception of a great deal of nausea was associated with 3 times that she tried the actual medication.  They did call the on-call doctor because of some terrible insomnia with very very little sleep and she was given some trazodone.  This does not seem to have helped either.  Today her mental status is greatly different.  I do have some concern over infection.        Subjective      Review of Systems:   Review of Systems   Constitutional:  Negative for fatigue and fever.   HENT:  Negative for congestion and ear pain.    Respiratory:  Negative for apnea, cough, chest tightness and shortness of breath.    Cardiovascular:  Negative for chest pain.   Gastrointestinal:  Negative for abdominal pain, constipation, diarrhea and nausea.   Musculoskeletal:  Negative for arthralgias.   Psychiatric/Behavioral:  Positive for decreased concentration, sleep disturbance and depressed mood. Negative for stress. The patient is nervous/anxious.        Past Medical History:   Past Medical History:   Diagnosis Date    Anxiety     Cataract     Disease of thyroid gland     Diverticulitis     Ovarian cancer 2003    Positive TB test        Past Surgical History:   Past Surgical History:   Procedure Laterality Date    APPENDECTOMY      CHOLECYSTECTOMY      HYSTERECTOMY         Family History:   Family History   Problem Relation  "Age of Onset    Lung cancer Mother     Colon cancer Maternal Uncle     Colon cancer Paternal Aunt        Social History:   Social History     Socioeconomic History    Marital status:    Tobacco Use    Smoking status: Former     Packs/day: 1.00     Years: 15.00     Additional pack years: 0.00     Total pack years: 15.00     Types: Cigarettes     Quit date:      Years since quittin.8    Smokeless tobacco: Never   Vaping Use    Vaping Use: Never used   Substance and Sexual Activity    Alcohol use: No    Drug use: No    Sexual activity: Defer       Medications:     Current Outpatient Medications:     dicyclomine (BENTYL) 10 MG capsule, Take 1 capsule by mouth 3 (Three) Times a Day Before Meals., Disp: 90 capsule, Rfl: 1    diphenhydrAMINE (BENADRYL) 25 mg capsule, Take 1 capsule by mouth Every 6 (Six) Hours As Needed for Itching., Disp: , Rfl:     metoprolol succinate XL (TOPROL-XL) 50 MG 24 hr tablet, Take 1 tablet by mouth once daily, Disp: 90 tablet, Rfl: 0    Synthroid 88 MCG tablet, Take 1 tablet by mouth Daily. Give name brand, Disp: 90 tablet, Rfl: 1    traZODone (DESYREL) 50 MG tablet, Take 0.5 tablets by mouth Every Night., Disp: 15 tablet, Rfl: 0    clonazePAM (KlonoPIN) 1 MG tablet, Take 1 tablet by mouth 2 (Two) Times a Day As Needed for Anxiety., Disp: 60 tablet, Rfl: 1    sertraline (Zoloft) 25 MG tablet, Take 1 tablet by mouth Daily. (Patient not taking: Reported on 10/11/2023), Disp: 30 tablet, Rfl: 2    Allergies:   Allergies   Allergen Reactions    Latex Itching, Dermatitis and Rash    Betadine [Povidone Iodine] Itching     Pt says her skin also blistered    Sulfa Antibiotics Swelling    Penicillins Rash       Objective     Physical Exam:  Vital Signs:   Vitals:    10/11/23 1258   BP: 138/86   Pulse: 103   SpO2: 97%   Weight: 54.5 kg (120 lb 3 oz)   Height: 160 cm (63\")     Body mass index is 21.29 kg/mý.     Physical Exam  Vitals and nursing note reviewed.   Constitutional:       " General: She is not in acute distress.     Appearance: Normal appearance. She is not ill-appearing.   HENT:      Head: Normocephalic and atraumatic.      Right Ear: Tympanic membrane and ear canal normal.      Left Ear: Tympanic membrane and ear canal normal.      Nose: Nose normal.   Cardiovascular:      Rate and Rhythm: Normal rate and regular rhythm.      Heart sounds: Normal heart sounds.   Pulmonary:      Effort: Pulmonary effort is normal.      Breath sounds: Normal breath sounds.   Neurological:      Mental Status: She is alert.      Motor: Weakness present.      Comments: Flat affect.  Withdrawn   Psychiatric:         Mood and Affect: Mood normal.         Procedures      Assessment / Plan      Assessment/Plan:   Diagnoses and all orders for this visit:    1. Altered mental status, unspecified altered mental status type (Primary)  -     Cancel: POCT urinalysis dipstick, automated  -     CBC Auto Differential  -     Comprehensive Metabolic Panel  -     Ambulatory Referral to Neurology  -     Ambulatory Referral to Psychiatry    2. Anxiety  -     Ambulatory Referral to Neurology  -     Ambulatory Referral to Psychiatry  -     clonazePAM (KlonoPIN) 1 MG tablet; Take 1 tablet by mouth 2 (Two) Times a Day As Needed for Anxiety.  Dispense: 60 tablet; Refill: 1    Other orders  -     CBC & Differential         Condition overall worsen.  Checking lab work and urine to see if there is anything physiologic also going on that could have worsened her mental status.  We are also making referral to neurology and psychiatry.  Unsure if this is more of a psychiatric issue or issues related to memory.  Rechecking labs gave Klonopin for the anxiousness.  She did very poorly with the sertraline medication.  That has been stopped.    Follow Up:   No follow-ups on file.    Dmitry Nicole  [facility]

## 2023-10-12 ENCOUNTER — CLINICAL SUPPORT (OUTPATIENT)
Dept: FAMILY MEDICINE CLINIC | Facility: CLINIC | Age: 88
End: 2023-10-12
Payer: MEDICARE

## 2023-10-12 DIAGNOSIS — R41.82 ALTERED MENTAL STATUS, UNSPECIFIED ALTERED MENTAL STATUS TYPE: Primary | ICD-10-CM

## 2023-10-12 LAB
ALBUMIN SERPL-MCNC: 4.8 G/DL (ref 3.7–4.7)
ALBUMIN/GLOB SERPL: 2.5 {RATIO} (ref 1.2–2.2)
ALP SERPL-CCNC: 64 IU/L (ref 44–121)
ALT SERPL-CCNC: 15 IU/L (ref 0–32)
AST SERPL-CCNC: 22 IU/L (ref 0–40)
BASOPHILS # BLD AUTO: 0 X10E3/UL (ref 0–0.2)
BASOPHILS NFR BLD AUTO: 0 %
BILIRUB BLD-MCNC: ABNORMAL MG/DL
BILIRUB SERPL-MCNC: 0.5 MG/DL (ref 0–1.2)
BUN SERPL-MCNC: 14 MG/DL (ref 8–27)
BUN/CREAT SERPL: 14 (ref 12–28)
CALCIUM SERPL-MCNC: 9.7 MG/DL (ref 8.7–10.3)
CHLORIDE SERPL-SCNC: 99 MMOL/L (ref 96–106)
CLARITY, POC: CLEAR
CO2 SERPL-SCNC: 25 MMOL/L (ref 20–29)
COLOR UR: YELLOW
CREAT SERPL-MCNC: 1.02 MG/DL (ref 0.57–1)
EGFRCR SERPLBLD CKD-EPI 2021: 54 ML/MIN/1.73
EOSINOPHIL # BLD AUTO: 0.1 X10E3/UL (ref 0–0.4)
EOSINOPHIL NFR BLD AUTO: 1 %
ERYTHROCYTE [DISTWIDTH] IN BLOOD BY AUTOMATED COUNT: 12.4 % (ref 11.7–15.4)
EXPIRATION DATE: ABNORMAL
GLOBULIN SER CALC-MCNC: 1.9 G/DL (ref 1.5–4.5)
GLUCOSE SERPL-MCNC: 118 MG/DL (ref 70–99)
GLUCOSE UR STRIP-MCNC: NEGATIVE MG/DL
HCT VFR BLD AUTO: 48.7 % (ref 34–46.6)
HGB BLD-MCNC: 16.4 G/DL (ref 11.1–15.9)
IMM GRANULOCYTES # BLD AUTO: 0 X10E3/UL (ref 0–0.1)
IMM GRANULOCYTES NFR BLD AUTO: 0 %
KETONES UR QL: NEGATIVE
LEUKOCYTE EST, POC: NEGATIVE
LYMPHOCYTES # BLD AUTO: 1.6 X10E3/UL (ref 0.7–3.1)
LYMPHOCYTES NFR BLD AUTO: 17 %
Lab: ABNORMAL
MCH RBC QN AUTO: 31 PG (ref 26.6–33)
MCHC RBC AUTO-ENTMCNC: 33.7 G/DL (ref 31.5–35.7)
MCV RBC AUTO: 92 FL (ref 79–97)
MONOCYTES # BLD AUTO: 0.7 X10E3/UL (ref 0.1–0.9)
MONOCYTES NFR BLD AUTO: 8 %
NEUTROPHILS # BLD AUTO: 6.9 X10E3/UL (ref 1.4–7)
NEUTROPHILS NFR BLD AUTO: 74 %
NITRITE UR-MCNC: NEGATIVE MG/ML
PH UR: 6 [PH] (ref 5–8)
PLATELET # BLD AUTO: 244 X10E3/UL (ref 150–450)
POTASSIUM SERPL-SCNC: 4.3 MMOL/L (ref 3.5–5.2)
PROT SERPL-MCNC: 6.7 G/DL (ref 6–8.5)
PROT UR STRIP-MCNC: ABNORMAL MG/DL
RBC # BLD AUTO: 5.29 X10E6/UL (ref 3.77–5.28)
RBC # UR STRIP: NEGATIVE /UL
SODIUM SERPL-SCNC: 142 MMOL/L (ref 134–144)
SP GR UR: 1.02 (ref 1–1.03)
UROBILINOGEN UR QL: NORMAL
WBC # BLD AUTO: 9.4 X10E3/UL (ref 3.4–10.8)

## 2023-10-14 LAB
BACTERIA UR CULT: NORMAL
BACTERIA UR CULT: NORMAL

## 2023-10-25 ENCOUNTER — TELEMEDICINE (OUTPATIENT)
Dept: PSYCHIATRY | Facility: CLINIC | Age: 88
End: 2023-10-25
Payer: MEDICARE

## 2023-10-25 DIAGNOSIS — F41.1 GENERALIZED ANXIETY DISORDER: Primary | Chronic | ICD-10-CM

## 2023-10-25 PROCEDURE — 1160F RVW MEDS BY RX/DR IN RCRD: CPT | Performed by: NURSE PRACTITIONER

## 2023-10-25 PROCEDURE — 1159F MED LIST DOCD IN RCRD: CPT | Performed by: NURSE PRACTITIONER

## 2023-10-25 PROCEDURE — 90792 PSYCH DIAG EVAL W/MED SRVCS: CPT | Performed by: NURSE PRACTITIONER

## 2023-10-25 RX ORDER — ESCITALOPRAM OXALATE 5 MG/1
5 TABLET ORAL DAILY
Qty: 30 TABLET | Refills: 1 | Status: SHIPPED | OUTPATIENT
Start: 2023-10-25

## 2023-10-25 NOTE — TREATMENT PLAN
Multi-Disciplinary Problems (from Behavioral Health Treatment Plan)      Active Problems       Problem: Anxiety  Start Date: 10/25/23      Problem Details: The patient self-scales this problem as a 5 with 10 being the worst.          Goal Priority Start Date Expected End Date End Date    Patient will develop and implement behavioral and cognitive strategies to reduce anxiety and irrational fears. -- 10/25/23 -- --    Goal Details: Progress toward goal:  Not appropriate to rate progress toward goal since this is the initial treatment plan.          Goal Intervention Frequency Start Date End Date    Help patient explore past emotional issues in relation to present anxiety. Q Month 10/25/23 --    Intervention Details: Duration of treatment until until discharged.          Goal Intervention Frequency Start Date End Date    Help patient develop an awareness of their cognitive and physical responses to anxiety. Q Month 10/25/23 --    Intervention Details: Duration of treatment until until discharged.                                 I have discussed and reviewed this treatment plan with the patient and/or guardian.  The patient has verbally agreed with this treatment plan (no signatures are obtained at today's visit as the patient is a telehealth patient and is unable to print and sign this document, therefore verbal agreement is obtained).

## 2023-10-25 NOTE — PROGRESS NOTES
"This provider is completing this appointment through [facility] (through [facility]), [address], [place], [address] using a secure [employer] Video Visit through [employer]. Patient is being seen remotely via telehealth in [place], and stated they are in a secure environment for this session. The patient's condition being diagnosed/treated is appropriate for telemedicine. The provider identified herself as well as her credentials.   The patient, and/or patients guardian, consent to be seen remotely, and when consent is given they understand that the consent allows for patient identifiable information to be sent to a third party as needed.   They may refuse to be seen remotely at any time. The electronic data is encrypted and password protected, and the patient and/or guardian has been advised of the potential risks to privacy not withstanding such measures.    You have chosen to receive care through a telehealth visit.  Do you consent to use a video/audio connection for your medical care today? Yes    Patient identifiers utilized: Name and date of birth.          Subjective   Katie Vaughn is a 87 y.o. female who presents today for initial evaluation     Chief Complaint:  Anxiety    Accompanied by: Pt's oldest son, Gabriel, and his wife, Tiffanie, are present during appointment with pt's verbal permission.     History of Present Illness:   Pt was referred to psychiatry and neurology by her PCP for altered mental status and anxiety. Pt's son explained that in 1989, pt suffered a \"nervous breakdown\", which was brought on by significant stressors in her life, such as baby-sitting a large number of children and conflict in her marriage. She was admitted to the local hospital's psychiatric unit for 7-10 days and made a full recovery. Pt followed up with psychiatry upon discharge for awhile. Pt believes she went without psychotropic medications for years. Per son, in 2005, pt was " "prescribed Ativan until her PCP retired/left the practice and she began seeing her new PCP, Dr. Nicole. Pt reportedly does not handle stress very well. In [id number], pt's  became very ill and the family started making preparations in case he did not survive. Thankfully, he did eventually recover. Pt was on the Ativan at that time, which may account for why she did not suffer any significant mental status changes. The family were concerned that she could due to the severity of that situation given her history. Per pt's son, both her prior PCP and Dr. Nicole did not feel pt should continue on a benzodiazepine; it was discontinued approximately a year ago. Approximately five weeks ago, pt began to display similar symptoms as she had when she had a \"nervous breakdown\" in 1989. They felt it was being triggered by a fraudulent charge on her credit card. The [employer] card company stated they didn't have any proof she didn't make the purchase and insisted she pay the charged amount. Pt's son states she became very emotional and stressed regarding it. She also helps care for her 88 YO  with chronic medical conditions along with mobility and some cognitive impairment. During this time, pt was not sleeping and had bad dreams. She stopped performing her ADL's and required assistance. Pt would lie in bed moaning and saying nonsensical things. She was digging in her head and scratching. Pt's family members would call and pt would not be able to carry on a conversation. Pt knows her son attends [employer] service every Sunday and Wednesday and the times at which he does. He reports she kept calling him while he was at [employer] about the fraudulent charges even after it was resolved because she was still concerned about it. At one point, pt forgot how to operate a phone. The family has had to repeatedly explain things to pt. Pt's son took pt to see her PCP regarding pt's recent changes and he ordered Zoloft 25 mg PO " Daily. Pt only took it for a few days due to significant GI side effects. Due to pt not having slept in a week, the on-call MD ordered pt Trazodone 25 mg PO QHS, which pt's son says was ineffective. Pt was brought back into see Dr. Nicole who prescribed Klonopin. Pt's son states he prescribed it so pt could get rest, but intended on it being temporary. The family considered admitting pt to a psychiatric facility but felt her condition would worsen if they were take her out of her home and away from her  (their father). Pt's son has been assisting pt with her medications. They were administering Klonopin 0.5 mg PO QAM and 1 mg PO QHS, but pt was too drowsy to eat in the mornings. Therefore, they reduced it to 0.5 mg PO BID. For the past six days, pt is showing signs of improvement. She states her thoughts are more together. She is starting to do word puzzles, getting herself ice cream, and doing more for herself. Pt's son feels pt's anxiety is the worst at night. Every night she will ask repetitive questions, especially about her medications. Pt is needing frequent reassurance because she states she wants to make sure she is thinking correctly. Pt was worried about coming to this appointment for fear she may say or do something wrong. She was also afraid she will be sent to a psychiatric facility. Pt worries about not being able to do the things she used to. She doesn't like to ask for help, but is realizing she is now at the age where she needs to. She admits to worrying more about her  than herself. Pt denies frequent sadness. Her concentration is slowly improving and she is beginning to find enjoyment in activities again. Pt's son, Gabriel, lives across the street, and her other son also lives fairly close. She has a strong support system. The patient rates their anxiety on average in the past week at a 4-5/10 on a 0-10 scale, with 10 being the worst. Pt's son would rate pt's anxiety in the morning a  "3/10 and an 8/10 at night on the same rating scale. Pt has an appointment with neurology for altered mental status on 1/16/24.    Current Psychiatric Medications:  Klonopin 0.5 mg PO BID PRN for anxiety    Prior Psychiatric Medications:  Klonopin  Zoloft - GI disturbance, took for a few days  Trazodone - ineffective at 25 mg  Ativan - did well on it in the past   Doxepin - 10-20 mg ineffective for sleep  Elavil    Currently in Counseling or Therapy:  Denies    Prior Psychiatric Outpatient Care:  Prior to her \"nervous breakdown\" in 1989, pt had not previously seen a mental health provider. The \"nervous breakdown\" in 1989 was brought on by significant stressors in her life, such as baby-sitting a large number of children and conflict in her marriage. Pt was 55 YO at the time. She was admitted to the psychiatric unit for 7-10 days and made a full recovery. Pt followed up with psychiatry upon discharge for awhile. In 2005, pt was prescribed Ativan until her PCP retired/left the practice and she began seeing her new PCP, Dr. Nicole. Per pt's son, both her prior PCP and Dr. Nicole did not feel pt should continue on a benzodiazepine. Dr. Nicole recently tried treating pt's anxiety with Zoloft, but pt responded poorly. Pt had not been sleeping for a week and Trazodone was ineffective. Pt's son states Dr. Nicole prescribed Klonopin, so pt could get some rest but intends on it being temporary.     Prior Psychiatric Hospitalizations:  Pt was admitted to a psychiatric unit at a hospital following a \"nervous breakdown\" in 1989, which was thought to be brought on by significant stressors in her life, such as baby-sitting a large number of children and conflict in her marriage. Pt was 55 YO at the time. She was admitted to the psychiatric unit for 7-10 days and made a full recovery.     Previous Suicide Attempts:  Denies    Previous Self-Harming Behavior:  Denies    Any family history of suicide attempts:  Nephew committed " "suicide    Legal History, Arrests, or Incarcerations:  Denies    Violent Tendencies:  Denies    History of Seizures or TBI:  Denies     Highest Level of Education:  10th grade    Employment:  Retired    Social History:  Born: [place]   Marriage status:  x1. They have been  for 69 years. Pt reports their marriage has had it's \"ups and downs\", but it is good overall.   Children: 2 sons. Pt has a good relationship with both children   Lives with: The patient's currently household consists of the patient and     Substance Use History:  Denies    Abuse History:  Psychological: Denies  Physical: Denies  Sexual: Denies  Other: Denies    Patient's Support Network Includes:   and children        The following portions of the patient's history were reviewed and updated as appropriate: allergies, current medications, past family history, past medical history, past social history, past surgical history and problem list.          Past Medical History:  Past Medical History:   Diagnosis Date    Anxiety     Cataract     Disease of thyroid gland     Diverticulitis     Ovarian cancer 2003    Positive TB test        Social History:  Social History     Socioeconomic History    Marital status:    Tobacco Use    Smoking status: Former     Packs/day: 1.00     Years: 15.00     Additional pack years: 0.00     Total pack years: 15.00     Types: Cigarettes     Quit date:      Years since quittin.8    Smokeless tobacco: Never   Vaping Use    Vaping Use: Never used   Substance and Sexual Activity    Alcohol use: No    Drug use: No    Sexual activity: Defer       Family History:  Family History   Problem Relation Age of Onset    Lung cancer Mother     Colon cancer Paternal Aunt     Colon cancer Maternal Uncle     Depression Sister     Suicide Attempts Nephew     Drug abuse Nephew     OCD Grandson        Past Surgical History:  Past Surgical History:   Procedure Laterality Date    APPENDECTOMY      " CHOLECYSTECTOMY      HYSTERECTOMY         Problem List:  Patient Active Problem List   Diagnosis    Diffuse large B-cell lymphoma of lymph nodes of multiple regions    Encounter for antineoplastic chemotherapy       Allergy:   Allergies   Allergen Reactions    Latex Itching, Dermatitis and Rash    Betadine [Povidone Iodine] Itching     Pt says her skin also blistered    Sulfa Antibiotics Swelling    Penicillins Rash        Current Medications:   Current Outpatient Medications   Medication Sig Dispense Refill    clonazePAM (KlonoPIN) 1 MG tablet Take 1 tablet by mouth 2 (Two) Times a Day As Needed for Anxiety. (Patient taking differently: Take 1 tablet by mouth 2 (Two) Times a Day As Needed for Anxiety. Take 1/2 tablet PO BID) 60 tablet 1    dicyclomine (BENTYL) 10 MG capsule Take 1 capsule by mouth 3 (Three) Times a Day Before Meals. (Patient taking differently: Take 1 capsule by mouth 3 (Three) Times a Day Before Meals. PRN) 90 capsule 1    metoprolol succinate XL (TOPROL-XL) 50 MG 24 hr tablet Take 1 tablet by mouth once daily 90 tablet 0    Synthroid 88 MCG tablet Take 1 tablet by mouth Daily. Give name brand 90 tablet 1    escitalopram (Lexapro) 5 MG tablet Take 1 tablet by mouth Daily. 30 tablet 1    MELATONIN PO Take 10 mg by mouth Every Night.       No current facility-administered medications for this visit.       Review of Symptoms:    Review of Systems   Constitutional:  Positive for activity change.   Neurological:  Positive for memory problem.   Psychiatric/Behavioral:  Positive for decreased concentration and stress. The patient is nervous/anxious.          Physical Exam:   Due to the remote nature of this encounter (virtual encounter), vitals were unable to be obtained.  Height stated at 63 inches.  Weight stated at 120 pounds.      Physical Exam  Neurological:      Mental Status: She is alert.   Psychiatric:         Attention and Perception: Attention normal. She does not perceive auditory or visual  hallucinations.         Mood and Affect: Mood is anxious. Affect is blunt.         Behavior: Behavior is slowed. Behavior is cooperative.         Thought Content: Thought content normal. Thought content is not paranoid or delusional. Thought content does not include homicidal or suicidal ideation. Thought content does not include homicidal or suicidal plan.         Cognition and Memory: Cognition is impaired. Memory is impaired.      Comments: Normal speech, but quiet. Pt needed son's assistance with some past history and recent events.           Mental Status Exam:   Hygiene:   good  Cooperation:   Cooperative but slowed  Eye Contact:  Good  Psychomotor Behavior:  Slow  Affect:  Blunted  Mood: anxious  Speech:   Normal but quiet  Thought Process:  Goal directed  Thought Content:  Mood congruent  Suicidal:  None  Homicidal:  None  Hallucinations:  None  Delusion:  None  Memory:  Deficits  Orientation:  Person, Place, Time, and Situation  Reliability:  fair  Insight:  Fair  Judgement:  Fair  Impulse Control:  Fair        PHQ-9 Depression Screening  Little interest or pleasure in doing things? (P) 2-->more than half the days   Feeling down, depressed, or hopeless? (P) 3-->nearly every day   Trouble falling or staying asleep, or sleeping too much? (P) 2-->more than half the days   Feeling tired or having little energy? (P) 3-->nearly every day   Poor appetite or overeating? (P) 1-->several days   Feeling bad about yourself - or that you are a failure or have let yourself or your family down? (P) 2-->more than half the days   Trouble concentrating on things, such as reading the newspaper or watching television? (P) 2-->more than half the days   Moving or speaking so slowly that other people could have noticed? Or the opposite - being so fidgety or restless that you have been moving around a lot more than usual? (P) 3-->nearly every day   Thoughts that you would be better off dead, or of hurting yourself in some way? (P)  0-->not at all   PHQ-9 Total Score (P) 18   If you checked off any problems, how difficult have these problems made it for you to do your work, take care of things at home, or get along with other people? (P) extremely difficult     PHQ-9 Total Score: (P) 18      YASMINE-7  Feeling nervous, anxious or on edge: (P) Nearly every day  Not being able to stop or control worrying: (P) Nearly every day  Worrying too much about different things: (P) Nearly every day  Trouble Relaxing: (P) Nearly every day  Being so restless that it is hard to sit still: (P) Several days  Feeling afraid as if something awful might happen: (P) Nearly every day  Becoming easily annoyed or irritable: (P) Several days  YASMINE 7 Total Score: (P) 17  If you checked any problems, how difficult have these problems made it for you to do your work, take care of things at home, or get along with other people: (P) Extremely difficult      PROMIS scale screening tool that patient filled out virtually prior to encounter beginning reviewed by this APRN at today's encounter.    [facility] request number [id number] reviewed by this APRN at today's encounter.    Previous Provider notes and available records reviewed by this APRN at today's encounter.     Patient screened positive for depression based on a PHQ-9 score of 18 on 10/25/2023. Follow-up recommendations include: Prescribed antidepressant medication treatment. Pt's son states he completed the PHQ-9 for pt based on how she has been feeling over the last two weeks. She has started to show some improvement over the past six days.         Lab Results:   Clinical Support on 10/12/2023   Component Date Value Ref Range Status    Color 10/12/2023 Yellow  Yellow, Straw, Dark Yellow, Delfina Final    Clarity, UA 10/12/2023 Clear  Clear Final    Specific Gravity  10/12/2023 1.023  1.005 - 1.030 Final    pH, Urine 10/12/2023 6.0  5.0 - 8.0 Final    Leukocytes 10/12/2023 Negative  Negative Final    Nitrite, UA 10/12/2023  Negative  Negative Final    Protein, POC 10/12/2023 1+ (A)  Negative mg/dL Final    Glucose, UA 10/12/2023 Negative  Negative mg/dL Final    Ketones, UA 10/12/2023 Negative  Negative Final    Urobilinogen, UA 10/12/2023 Normal  Normal, 0.2 E.U./dL Final    Bilirubin 10/12/2023 1 mg/dL (A)  Negative Final    Blood, UA 10/12/2023 Negative  Negative Final    Lot Number 10/12/2023 [id number]   Final    Expiration Date 10/12/2023 93,024   Final    Urine Culture 10/12/2023 Final report   Final    Result 1 10/12/2023 Comment   Final    Comment: Mixed urogenital dale  50,000-100,000 colony forming units per mL     Office Visit on 10/11/2023   Component Date Value Ref Range Status    Glucose 10/11/2023 118 (H)  70 - 99 mg/dL Final    BUN 10/11/2023 14  8 - 27 mg/dL Final    Creatinine 10/11/2023 1.02 (H)  0.57 - 1.00 mg/dL Final    EGFR Result 10/11/2023 54 (L)  >59 mL/min/1.73 Final    BUN/Creatinine Ratio 10/11/2023 14  12 - 28 Final    Sodium 10/11/2023 142  134 - 144 mmol/L Final    Potassium 10/11/2023 4.3  3.5 - 5.2 mmol/L Final    Chloride 10/11/2023 99  96 - 106 mmol/L Final    Total CO2 10/11/2023 25  20 - 29 mmol/L Final    Calcium 10/11/2023 9.7  8.7 - 10.3 mg/dL Final    Total Protein 10/11/2023 6.7  6.0 - 8.5 g/dL Final    Albumin 10/11/2023 4.8 (H)  3.7 - 4.7 g/dL Final    Globulin 10/11/2023 1.9  1.5 - 4.5 g/dL Final    A/G Ratio 10/11/2023 2.5 (H)  1.2 - 2.2 Final    Total Bilirubin 10/11/2023 0.5  0.0 - 1.2 mg/dL Final    Alkaline Phosphatase 10/11/2023 64  44 - 121 IU/L Final    AST (SGOT) 10/11/2023 22  0 - 40 IU/L Final    ALT (SGPT) 10/11/2023 15  0 - 32 IU/L Final    WBC 10/11/2023 9.4  3.4 - 10.8 x10E3/uL Final    RBC 10/11/2023 5.29 (H)  3.77 - 5.28 x10E6/uL Final    Hemoglobin 10/11/2023 16.4 (H)  11.1 - 15.9 g/dL Final    Hematocrit 10/11/2023 48.7 (H)  34.0 - 46.6 % Final    MCV 10/11/2023 92  79 - 97 fL Final    MCH 10/11/2023 31.0  26.6 - 33.0 pg Final    MCHC 10/11/2023 33.7  31.5 - 35.7 g/dL  Final    RDW 10/11/2023 12.4  11.7 - 15.4 % Final    Platelets 10/11/2023 244  150 - 450 x10E3/uL Final    Neutrophil Rel % 10/11/2023 74  Not Estab. % Final    Lymphocyte Rel % 10/11/2023 17  Not Estab. % Final    Monocyte Rel % 10/11/2023 8  Not Estab. % Final    Eosinophil Rel % 10/11/2023 1  Not Estab. % Final    Basophil Rel % 10/11/2023 0  Not Estab. % Final    Neutrophils Absolute 10/11/2023 6.9  1.4 - 7.0 x10E3/uL Final    Lymphocytes Absolute 10/11/2023 1.6  0.7 - 3.1 x10E3/uL Final    Monocytes Absolute 10/11/2023 0.7  0.1 - 0.9 x10E3/uL Final    Eosinophils Absolute 10/11/2023 0.1  0.0 - 0.4 x10E3/uL Final    Basophils Absolute 10/11/2023 0.0  0.0 - 0.2 x10E3/uL Final    Immature Granulocyte Rel % 10/11/2023 0  Not Estab. % Final    Immature Grans Absolute 10/11/2023 0.0  0.0 - 0.1 x10E3/uL Final         Assessment & Plan   Problems Addressed this Visit    None  Visit Diagnoses       Generalized anxiety disorder  (Chronic)   -  Primary    Relevant Medications    escitalopram (Lexapro) 5 MG tablet          Diagnoses         Codes Comments    Generalized anxiety disorder    -  Primary ICD-10-CM: F41.1  ICD-9-CM: 300.02             Visit Diagnoses:    ICD-10-CM ICD-9-CM   1. Generalized anxiety disorder  F41.1 300.02       Rule out MDD       GOALS:  Short Term Goals: Patient will be compliant with medication, and patient will have no significant medication related side effects.  Patient will be engaged in psychotherapy as indicated.  Patient will report subjective improvement of symptoms.  Long term goals: To stabilize mood and treat/improve subjective symptoms, the patient will stay out of the hospital, the patient will be at an optimal level of functioning, and the patient will take all medications as prescribed.  The patient verbalized understanding and agreement with goals that were mutually set.      TREATMENT PLAN:   Continue supportive psychotherapy efforts and medications as indicated.   -Start  Lexapro 5 mg PO Daily for anxiety  -Pt is prescribed Klonopin 1 mg PO BID PRN for anxiety from PCP. However, this dose was too sedating and pt has been taking Klonopin 0.5 mg PO BID instead. This APRN discussed with patient and her son that benzodiazepines are not generally recommended in the geriatric population as the longer half-lives can accumulate in the body and cause prolonged sedation. They can also cause memory loss and intellectual and cognitive impairments, such as anterograde amnesia, diminished short-term recall, and increased forgetfulness. Benzodiazepines can also increase unsteadiness, disinhibition, psychomotor impairment leading to falls or MVA's, and possibly addiction.     *The goal is to find a SSRI/SNRI to manage pt's daily anxiety. Then, taper pt off Klonopin. If pt is unable to rest without it, another medication will be tried*    Medication and treatment options, both pharmacological and non-pharmacological treatment options, discussed during today's visit, including any off label use of medication. Patient acknowledged and verbally consented with current treatment plan and was educated on the importance of compliance with treatment and follow-up appointments.       MEDICATION ISSUES:    Discussed treatment plan and medication options of prescribed medication as well as the risks, benefits, any black box warnings, and side effects including potential falls, possible impaired driving, and metabolic adversities among others, including any off label use of medication. Patient is agreeable to call the office with any worsening of symptoms or onset of side effects, or if any concerns or questions arise.  The contact information for the office is made available to the patient. They may call the [facility] at [phone]. Patient is agreeable to call 911 or go to the nearest ER should they begin having any SI/HI, or if any urgent concerns arise.        MEDS ORDERED  DURING VISIT:  New Medications Ordered This Visit   Medications    escitalopram (Lexapro) 5 MG tablet     Sig: Take 1 tablet by mouth Daily.     Dispense:  30 tablet     Refill:  1       Return in about 3 weeks (around 11/15/2023), or if symptoms worsen or fail to improve, for Recheck.     Treatment plan completed: 10/25/23    Progress toward goal: Not at goal    Functional Status: Moderate impairment to severe impairment    Prognosis: Fair with Ongoing Treatment       This patient will not be seen for the in person visits due to the following exception(s): It would be a hardship for this patient to see a provider in person.    It is my professional opinion that the patient is clinically stable and an in person visit will risk worsening the patient's condition creating undue hardship.           This document has been electronically signed by TOÑO Anthony  October 25, 2023 18:24 EDT    Some of the data in this electronic note has been brought forward from a previous encounter, any necessary changes have been made, it has been reviewed by this APRN, and it is accurate.    Please note that portions of this note were completed with a voice recognition program. Efforts were made to edit dictation, but occasionally words are mistranscribed.

## 2023-11-16 ENCOUNTER — TELEMEDICINE (OUTPATIENT)
Dept: PSYCHIATRY | Facility: CLINIC | Age: 88
End: 2023-11-16
Payer: MEDICARE

## 2023-11-16 DIAGNOSIS — F41.1 GENERALIZED ANXIETY DISORDER: Primary | Chronic | ICD-10-CM

## 2023-11-16 PROCEDURE — 99213 OFFICE O/P EST LOW 20 MIN: CPT | Performed by: NURSE PRACTITIONER

## 2023-11-16 PROCEDURE — 1160F RVW MEDS BY RX/DR IN RCRD: CPT | Performed by: NURSE PRACTITIONER

## 2023-11-16 PROCEDURE — 1159F MED LIST DOCD IN RCRD: CPT | Performed by: NURSE PRACTITIONER

## 2023-11-16 NOTE — PROGRESS NOTES
This provider is completing this appointment through [facility] (through [facility]), [address], [facility] [place], [address] using a secure [employer] Video Visit through [employer]. Patient is being seen remotely via telehealth in [place], and stated they are in a secure environment for this session. The patient's condition being diagnosed/treated is appropriate for telemedicine. The provider identified herself as well as her credentials.   The patient, and/or patients guardian, consent to be seen remotely, and when consent is given they understand that the consent allows for patient identifiable information to be sent to a third party as needed.   They may refuse to be seen remotely at any time. The electronic data is encrypted and password protected, and the patient and/or guardian has been advised of the potential risks to privacy not withstanding such measures.    You have chosen to receive care through a telehealth visit.  Do you consent to use a video/audio connection for your medical care today? Yes    Patient identifiers utilized: Name and date of birth.        Subjective   Katie Vaughn is a 87 y.o. female who presents today for follow up    Chief Complaint:  Anxiety    Accompanied by: Pt's oldest son, Gabriel, and his wife, Tiffanie, are present during appointment with pt's verbal permission.     History of Present Illness:   Pt states she has been doing better since her last appointment. Pt is getting around more and out of the house. Pt has not been driving. Pt cooked her first meal last night; it was the first time since September. Pt reports she tries not to worry as much. However, her 's cognitive and physical health continue to decline. Pt's son reports that his father mostly has good days, but there are times when he gets angry over little things. Per son, his father is slow and not very mobile. Pt tends to worry about him. Pt's son states pt doesn't vocalize as many  worries or concerns since the Lexapro. Pt continues to ask for reassurance, but not has often. Pt is doing fairly well sleeping, she does wake to use the bathroom x2-3 times during the night. She is able to fall back asleep. Her gait is reportedly steady when she wakes during the night. Pt continues to take the Klonopin 0.5 mg PO BID. She is taking the Lexapro in the morning. Pt wakes up around 7:30 AM, takes her medication around 9-9:30 AM, and will then feel the need to sit down and watch television for 1-1.5 hours due to feeling somewhat tired. She does tend to still get up during that time to do things around the house. Pt denies excessive daytime fatigue. Pt is trying to keep her mind active by reading. Appetite is stable. Pt hasn't been feeling anxious at night time. Pt does find that she isn't as talkative as she was prior to September. The patient denies any new medical problems since last appointment with this facility. The patient reports compliance with current medication regimen. The patient denies any current side effects from their current medication regimen. The patient denies any abnormal muscle movements or tics.  The patient rates their anxiety on average the past week at a 5/10 on a 0-10 scale, with 10 being the worst. The patient denies any suicidal or homicidal ideations, plans, or intent at today's encounter and is convincing. The patient denies any auditory hallucinations or visual hallucinations. The patient does not endorse any significant symptoms consistent with will or psychosis at today's encounter.     *If the patient has any concerns or needs assistance, they may call the [facility] at [phone]*      Prior Psychiatric Medications:  Klonopin  Zoloft - GI disturbance, took for a few days  Trazodone - ineffective at 25 mg  Ativan - did well on it in the past   Doxepin - 10-20 mg ineffective for sleep  Elavil      The following portions of the patient's  history were reviewed and updated as appropriate: allergies, current medications, past family history, past medical history, past social history, past surgical history and problem list.          Past Medical History:  Past Medical History:   Diagnosis Date    Anxiety     Cataract     Disease of thyroid gland     Diverticulitis     Ovarian cancer 2003    Positive TB test        Social History:  Social History     Socioeconomic History    Marital status:    Tobacco Use    Smoking status: Former     Packs/day: 1.00     Years: 15.00     Additional pack years: 0.00     Total pack years: 15.00     Types: Cigarettes     Quit date:      Years since quittin.9    Smokeless tobacco: Never   Vaping Use    Vaping Use: Never used   Substance and Sexual Activity    Alcohol use: No    Drug use: No    Sexual activity: Defer       Family History:  Family History   Problem Relation Age of Onset    Lung cancer Mother     Colon cancer Paternal Aunt     Colon cancer Maternal Uncle     Depression Sister     Suicide Attempts Nephew     Drug abuse Nephew     OCD Grandson        Past Surgical History:  Past Surgical History:   Procedure Laterality Date    APPENDECTOMY      CHOLECYSTECTOMY      HYSTERECTOMY         Problem List:  Patient Active Problem List   Diagnosis    Diffuse large B-cell lymphoma of lymph nodes of multiple regions    Encounter for antineoplastic chemotherapy       Allergy:   Allergies   Allergen Reactions    Latex Itching, Dermatitis and Rash    Betadine [Povidone Iodine] Itching     Pt says her skin also blistered    Sulfa Antibiotics Swelling    Penicillins Rash        Current Medications:   Current Outpatient Medications   Medication Sig Dispense Refill    clonazePAM (KlonoPIN) 1 MG tablet Take 1 tablet by mouth 2 (Two) Times a Day As Needed for Anxiety. (Patient taking differently: Take 1 tablet by mouth 2 (Two) Times a Day As Needed for Anxiety. Take 1/2 tablet PO BID) 60 tablet 1    dicyclomine  (BENTYL) 10 MG capsule Take 1 capsule by mouth 3 (Three) Times a Day Before Meals. (Patient taking differently: Take 1 capsule by mouth 3 (Three) Times a Day Before Meals. PRN) 90 capsule 1    escitalopram (Lexapro) 5 MG tablet Take 1 tablet by mouth Daily. 30 tablet 1    MELATONIN PO Take 10 mg by mouth Every Night.      metoprolol succinate XL (TOPROL-XL) 50 MG 24 hr tablet Take 1 tablet by mouth once daily 90 tablet 0    Synthroid 88 MCG tablet Take 1 tablet by mouth Daily. Give name brand 90 tablet 1     No current facility-administered medications for this visit.       Review of Symptoms:    Review of Systems   Constitutional: Negative.    Psychiatric/Behavioral:  Positive for stress. The patient is nervous/anxious.          Physical Exam:   Due to the remote nature of this encounter (virtual encounter), vitals were unable to be obtained.  Height stated at 63 inches.  Weight stated at 120 pounds.      Physical Exam  Neurological:      Mental Status: She is alert.   Psychiatric:         Attention and Perception: Attention and perception normal. She does not perceive auditory or visual hallucinations.         Mood and Affect: Mood normal.         Behavior: Behavior is slowed. Behavior is cooperative.         Thought Content: Thought content normal. Thought content is not paranoid or delusional. Thought content does not include homicidal or suicidal ideation. Thought content does not include homicidal or suicidal plan.         Cognition and Memory: Cognition normal.      Comments: Normal speech, but quiet. Restricted affect.            Mental Status Exam:   Hygiene:   good  Cooperation:   Cooperative but slowed  Eye Contact:  Good  Psychomotor Behavior:  Slow  Affect:  Restricted  Mood: normal  Speech:   Normal but quiet  Thought Process:  Linear  Thought Content:  Normal  Suicidal:  None  Homicidal:  None  Hallucinations:  None  Delusion:  None  Memory:  Deficits  Orientation:  Person, Place, Time, and  Situation  Reliability:  fair  Insight:  Fair  Judgement:  Fair  Impulse Control:  Fair        PHQ-9 Depression Screening  Little interest or pleasure in doing things? (P) 1-->several days   Feeling down, depressed, or hopeless? (P) 1-->several days   Trouble falling or staying asleep, or sleeping too much? (P) 1-->several days   Feeling tired or having little energy? (P) 1-->several days   Poor appetite or overeating? (P) 0-->not at all   Feeling bad about yourself - or that you are a failure or have let yourself or your family down? (P) 0-->not at all   Trouble concentrating on things, such as reading the newspaper or watching television? (P) 1-->several days   Moving or speaking so slowly that other people could have noticed? Or the opposite - being so fidgety or restless that you have been moving around a lot more than usual? (P) 0-->not at all   Thoughts that you would be better off dead, or of hurting yourself in some way? (P) 0-->not at all   PHQ-9 Total Score (P) 5   If you checked off any problems, how difficult have these problems made it for you to do your work, take care of things at home, or get along with other people? (P) somewhat difficult     PHQ-9 Total Score: (P) 5      YASMINE-7  Feeling nervous, anxious or on edge: (P) Several days  Not being able to stop or control worrying: (P) Several days  Worrying too much about different things: (P) Several days  Trouble Relaxing: (P) Not at all  Being so restless that it is hard to sit still: (P) Not at all  Feeling afraid as if something awful might happen: (P) Not at all  Becoming easily annoyed or irritable: (P) Several days  YASMINE 7 Total Score: (P) 4  If you checked any problems, how difficult have these problems made it for you to do your work, take care of things at home, or get along with other people: (P) Not difficult at all      PROMIS scale screening tool that patient filled out virtually prior to encounter beginning reviewed by this APRN at today's  encounter.    Previous Provider notes and available records reviewed by this APRN at today's encounter.       Lab Results:   No visits with results within 1 Month(s) from this visit.   Latest known visit with results is:   Clinical Support on 10/12/2023   Component Date Value Ref Range Status    Color 10/12/2023 Yellow  Yellow, Straw, Dark Yellow, Delfina Final    Clarity, UA 10/12/2023 Clear  Clear Final    Specific Gravity  10/12/2023 1.023  1.005 - 1.030 Final    pH, Urine 10/12/2023 6.0  5.0 - 8.0 Final    Leukocytes 10/12/2023 Negative  Negative Final    Nitrite, UA 10/12/2023 Negative  Negative Final    Protein, POC 10/12/2023 1+ (A)  Negative mg/dL Final    Glucose, UA 10/12/2023 Negative  Negative mg/dL Final    Ketones, UA 10/12/2023 Negative  Negative Final    Urobilinogen, UA 10/12/2023 Normal  Normal, 0.2 E.U./dL Final    Bilirubin 10/12/2023 1 mg/dL (A)  Negative Final    Blood, UA 10/12/2023 Negative  Negative Final    Lot Number 10/12/2023 [id number]   Final    Expiration Date 10/12/2023 93,024   Final    Urine Culture 10/12/2023 Final report   Final    Result 1 10/12/2023 Comment   Final    Comment: Mixed urogenital dale  50,000-100,000 colony forming units per mL           Assessment & Plan   Problems Addressed this Visit    None  Visit Diagnoses       Generalized anxiety disorder  (Chronic)   -  Primary          Diagnoses         Codes Comments    Generalized anxiety disorder    -  Primary ICD-10-CM: F41.1  ICD-9-CM: 300.02             Visit Diagnoses:    ICD-10-CM ICD-9-CM   1. Generalized anxiety disorder  F41.1 300.02         Rule out MDD       GOALS:  Short Term Goals: Patient will be compliant with medication, and patient will have no significant medication related side effects.  Patient will be engaged in psychotherapy as indicated.  Patient will report subjective improvement of symptoms.  Long term goals: To stabilize mood and treat/improve subjective symptoms, the patient will stay out of the  hospital, the patient will be at an optimal level of functioning, and the patient will take all medications as prescribed.  The patient verbalized understanding and agreement with goals that were mutually set.      TREATMENT PLAN:   Continue supportive psychotherapy efforts and medications as indicated.   -Continue Lexapro 5 mg PO Daily for anxiety  -Decrease the Klonopin to 0.5 mg PO QHS PRN for anxiety that her PCP has prescribed. Discussed with pt and family that this APRN does not prescribe long-term benzodiazepines. Therefore, if Klonopin is a medication she would like to continue, she will have to follow up with another provider.     Medication and treatment options, both pharmacological and non-pharmacological treatment options, discussed during today's visit, including any off label use of medication. Patient acknowledged and verbally consented with current treatment plan and was educated on the importance of compliance with treatment and follow-up appointments.       MEDICATION ISSUES:    Discussed treatment plan and medication options of prescribed medication as well as the risks, benefits, any black box warnings, and side effects including potential falls, possible impaired driving, and metabolic adversities among others, including any off label use of medication. Patient is agreeable to call the office with any worsening of symptoms or onset of side effects, or if any concerns or questions arise.  The contact information for the office is made available to the patient. They may call the [facility] at [phone]. Patient is agreeable to call 911 or go to the nearest ER should they begin having any SI/HI, or if any urgent concerns arise.        MEDS ORDERED DURING VISIT:  No orders of the defined types were placed in this encounter.      Return in about 3 weeks (around 12/7/2023), or if symptoms worsen or fail to improve, for Recheck.     Treatment plan completed:  10/25/23    Progress toward goal: Not at goal    Functional Status: Moderate impairment     Prognosis: Fair with Ongoing Treatment       This patient will not be seen for the in person visits due to the following exception(s): It would be a hardship for this patient to see a provider in person.    It is my professional opinion that the patient is clinically stable and an in person visit will risk worsening the patient's condition creating undue hardship.           This document has been electronically signed by TOÑO Anthony  November 16, 2023 10:36 EST    Some of the data in this electronic note has been brought forward from a previous encounter, any necessary changes have been made, it has been reviewed by this APRN, and it is accurate.    Please note that portions of this note were completed with a voice recognition program. Efforts were made to edit dictation, but occasionally words are mistranscribed.

## 2023-12-06 ENCOUNTER — TELEMEDICINE (OUTPATIENT)
Dept: PSYCHIATRY | Facility: CLINIC | Age: 88
End: 2023-12-06
Payer: MEDICARE

## 2023-12-06 DIAGNOSIS — F41.1 GENERALIZED ANXIETY DISORDER: Primary | Chronic | ICD-10-CM

## 2023-12-06 DIAGNOSIS — G47.9 SLEEP DIFFICULTIES: ICD-10-CM

## 2023-12-06 PROCEDURE — 1160F RVW MEDS BY RX/DR IN RCRD: CPT | Performed by: NURSE PRACTITIONER

## 2023-12-06 PROCEDURE — 1159F MED LIST DOCD IN RCRD: CPT | Performed by: NURSE PRACTITIONER

## 2023-12-06 PROCEDURE — 99214 OFFICE O/P EST MOD 30 MIN: CPT | Performed by: NURSE PRACTITIONER

## 2023-12-06 RX ORDER — ESCITALOPRAM OXALATE 5 MG/1
5 TABLET ORAL DAILY
Qty: 90 TABLET | Refills: 0 | Status: SHIPPED | OUTPATIENT
Start: 2023-12-06

## 2023-12-06 RX ORDER — MIRTAZAPINE 15 MG/1
TABLET, FILM COATED ORAL
Qty: 30 TABLET | Refills: 1 | Status: SHIPPED | OUTPATIENT
Start: 2023-12-06

## 2023-12-22 DIAGNOSIS — I10 PRIMARY HYPERTENSION: ICD-10-CM

## 2023-12-22 RX ORDER — METOPROLOL SUCCINATE 50 MG/1
TABLET, EXTENDED RELEASE ORAL
Qty: 90 TABLET | Refills: 0 | Status: SHIPPED | OUTPATIENT
Start: 2023-12-22

## 2024-01-03 ENCOUNTER — TELEMEDICINE (OUTPATIENT)
Dept: PSYCHIATRY | Facility: CLINIC | Age: 89
End: 2024-01-03
Payer: MEDICARE

## 2024-01-03 DIAGNOSIS — G47.9 SLEEP DIFFICULTIES: Chronic | ICD-10-CM

## 2024-01-03 DIAGNOSIS — F41.1 GENERALIZED ANXIETY DISORDER: Primary | Chronic | ICD-10-CM

## 2024-01-03 PROCEDURE — 1160F RVW MEDS BY RX/DR IN RCRD: CPT | Performed by: NURSE PRACTITIONER

## 2024-01-03 PROCEDURE — 99214 OFFICE O/P EST MOD 30 MIN: CPT | Performed by: NURSE PRACTITIONER

## 2024-01-03 PROCEDURE — 1159F MED LIST DOCD IN RCRD: CPT | Performed by: NURSE PRACTITIONER

## 2024-01-03 RX ORDER — MIRTAZAPINE 15 MG/1
TABLET, FILM COATED ORAL
Qty: 90 TABLET | Refills: 0 | Status: SHIPPED | OUTPATIENT
Start: 2024-01-03

## 2024-01-03 NOTE — PROGRESS NOTES
This provider is completing this appointment through [facility] (through [facility]), [address], [place], [address] using a secure [employer] Video Visit through [employer]. Patient is being seen remotely via telehealth in [place], and stated they are in a secure environment for this session. The patient's condition being diagnosed/treated is appropriate for telemedicine. The provider identified herself as well as her credentials.   The patient, and/or patients guardian, consent to be seen remotely, and when consent is given they understand that the consent allows for patient identifiable information to be sent to a third party as needed.   They may refuse to be seen remotely at any time. The electronic data is encrypted and password protected, and the patient and/or guardian has been advised of the potential risks to privacy not withstanding such measures.    You have chosen to receive care through a telehealth visit.  Do you consent to use a video/audio connection for your medical care today? Yes    Patient identifiers utilized: Name and date of birth.        Subjective   Katie Vaughn is a 88 y.o. female who presents today for follow up    Chief Complaint:  Anxiety and sleep difficulties    Accompanied by: Pt's oldest son, Gabriel, and his wife, Tiffanie, are present during appointment with pt's verbal permission.     History of Present Illness:   Pt reports she spent [place] at home with her family. One of her son's, Gabriel, was sick and wasn't able to make it. Pt has been cooking and fixed New Year's Day lunch. Pt still has not driven. Her son is a little apprehensive about her doing so, but her  is anxious for her to start because he wants to get out of the house for a ride. Discussed with pt that as long as she is fully alert and oriented with good judgement, has good eyesight, and feels comfortable, then this APRN doesn't see an issue. This APRN did recommend she  "start off driving a short distance from her home with someone, like her son, to become familiar again. Pt and family verbalized understanding. Pt has replaced the Klonopin with the Remeron 15 mg and is averaging the same amount of hours, 5-6, and wakes up feeling rested. She does have silly dreams that she can remember on occasion. Mood has been stable and pt is in good spirits. Pt's son, Gabriel, feels pt is \"back to herself\". Her personality has returned and she is talkative. Pt reports she had several calls on her birthday from friends she had not spoken to in awhile and they were surprised she had an episode of altered mental status as she seemed like her usual self. Appetite is stable. The patient denies any new medical problems since last appointment with this facility. Pt and her family are wondering if she needs to keep her neurology appointment on 1/16/24. This APRN recommended pt ask her PCP who made the referral when she follows-up with him next week. The patient reports compliance with current medication regimen. The patient would like to not adjust or change their medications at this visit. The patient denies any suicidal or homicidal ideations, plans, or intent at today's encounter and is convincing. The patient denies any auditory hallucinations or visual hallucinations. The patient does not endorse any significant symptoms consistent with will or psychosis at today's encounter.     *If the patient has any concerns or needs assistance, they may call the [facility] at [phone]*        Prior Psychiatric Medications:  Klonopin  Zoloft - GI disturbance, took for a few days  Trazodone - ineffective at 25 mg  Ativan - did well on it in the past   Doxepin - 10-20 mg ineffective for sleep  Elavil      The following portions of the patient's history were reviewed and updated as appropriate: allergies, current medications, past family history, past medical history, past social " history, past surgical history and problem list.          Past Medical History:  Past Medical History:   Diagnosis Date    Anxiety     Cataract     Disease of thyroid gland     Diverticulitis     Ovarian cancer 2003    Positive TB test        Social History:  Social History     Socioeconomic History    Marital status:    Tobacco Use    Smoking status: Former     Packs/day: 1.00     Years: 15.00     Additional pack years: 0.00     Total pack years: 15.00     Types: Cigarettes     Quit date:      Years since quittin.0    Smokeless tobacco: Never   Vaping Use    Vaping Use: Never used   Substance and Sexual Activity    Alcohol use: No    Drug use: No    Sexual activity: Defer       Family History:  Family History   Problem Relation Age of Onset    Lung cancer Mother     Colon cancer Paternal Aunt     Colon cancer Maternal Uncle     Depression Sister     Suicide Attempts Nephew     Drug abuse Nephew     OCD Grandson        Past Surgical History:  Past Surgical History:   Procedure Laterality Date    APPENDECTOMY      CHOLECYSTECTOMY      HYSTERECTOMY         Problem List:  Patient Active Problem List   Diagnosis    Diffuse large B-cell lymphoma of lymph nodes of multiple regions    Encounter for antineoplastic chemotherapy       Allergy:   Allergies   Allergen Reactions    Latex Itching, Dermatitis and Rash    Betadine [Povidone Iodine] Itching     Pt says her skin also blistered    Sulfa Antibiotics Swelling    Penicillins Rash        Current Medications:   Current Outpatient Medications   Medication Sig Dispense Refill    mirtazapine (Remeron) 15 MG tablet Take 1/2-1 tablet PO QHS for sleep. The 1/2 tablet is more sedating than the whole tablet. 90 tablet 0    dicyclomine (BENTYL) 10 MG capsule Take 1 capsule by mouth 3 (Three) Times a Day Before Meals. (Patient taking differently: Take 1 capsule by mouth 3 (Three) Times a Day Before Meals. PRN) 90 capsule 1    escitalopram (Lexapro) 5 MG tablet Take 1  tablet by mouth Daily. 90 tablet 0    MELATONIN PO Take 10 mg by mouth Every Night.      metoprolol succinate XL (TOPROL-XL) 50 MG 24 hr tablet Take 1 tablet by mouth once daily 90 tablet 0    Synthroid 88 MCG tablet Take 1 tablet by mouth Daily. Give name brand 90 tablet 1     No current facility-administered medications for this visit.       Review of Symptoms:    Review of Systems   Constitutional: Negative.    Psychiatric/Behavioral: Negative.           Physical Exam:   Due to the remote nature of this encounter (virtual encounter), vitals were unable to be obtained.  Height stated at 63 inches.  Weight stated at 120 pounds.      Physical Exam  Neurological:      Mental Status: She is alert.   Psychiatric:         Attention and Perception: Attention and perception normal. She does not perceive auditory or visual hallucinations.         Mood and Affect: Mood and affect normal.         Speech: Speech normal.         Behavior: Behavior normal. Behavior is cooperative.         Thought Content: Thought content normal. Thought content is not paranoid or delusional. Thought content does not include homicidal or suicidal ideation. Thought content does not include homicidal or suicidal plan.         Cognition and Memory: Cognition and memory normal.         Judgment: Judgment normal.      Comments: Euthymic           Mental Status Exam:   Hygiene:   good  Cooperation:  Cooperative  Eye Contact:  Good  Psychomotor Behavior:  Appropriate  Affect:  Full range  Mood: euthymic  Speech:  Normal  Thought Process:  Goal directed  Thought Content:  Normal  Suicidal:  None  Homicidal:  None  Hallucinations:  None  Delusion:  None  Memory:  Intact  Orientation:  Person, Place, Time, and Situation  Reliability:  good  Insight:  Good  Judgement:  Good  Impulse Control:  Good        PHQ-9 Depression Screening  Little interest or pleasure in doing things? (P) 1-->several days   Feeling down, depressed, or hopeless? (P) 0-->not at all    Trouble falling or staying asleep, or sleeping too much? (P) 1-->several days   Feeling tired or having little energy? (P) 1-->several days   Poor appetite or overeating? (P) 0-->not at all   Feeling bad about yourself - or that you are a failure or have let yourself or your family down? (P) 0-->not at all   Trouble concentrating on things, such as reading the newspaper or watching television? (P) 0-->not at all   Moving or speaking so slowly that other people could have noticed? Or the opposite - being so fidgety or restless that you have been moving around a lot more than usual? (P) 0-->not at all   Thoughts that you would be better off dead, or of hurting yourself in some way? (P) 0-->not at all   PHQ-9 Total Score (P) 3   If you checked off any problems, how difficult have these problems made it for you to do your work, take care of things at home, or get along with other people? (P) not difficult at all     PHQ-9 Total Score: (P) 3      YASMINE-7  Feeling nervous, anxious or on edge: (P) Several days  Not being able to stop or control worrying: (P) Several days  Worrying too much about different things: (P) Several days  Trouble Relaxing: (P) Not at all  Being so restless that it is hard to sit still: (P) Not at all  Feeling afraid as if something awful might happen: (P) Not at all  Becoming easily annoyed or irritable: (P) Not at all  YASMINE 7 Total Score: (P) 3  If you checked any problems, how difficult have these problems made it for you to do your work, take care of things at home, or get along with other people: (P) Not difficult at all    Previous Provider notes and available records reviewed by this APRN at today's encounter.         Lab Results:   No visits with results within 1 Month(s) from this visit.   Latest known visit with results is:   Clinical Support on 10/12/2023   Component Date Value Ref Range Status    Color 10/12/2023 Yellow  Yellow, Straw, Dark Yellow, Delfina Final    Clarity, UA 10/12/2023  Clear  Clear Final    Specific Gravity  10/12/2023 1.023  1.005 - 1.030 Final    pH, Urine 10/12/2023 6.0  5.0 - 8.0 Final    Leukocytes 10/12/2023 Negative  Negative Final    Nitrite, UA 10/12/2023 Negative  Negative Final    Protein, POC 10/12/2023 1+ (A)  Negative mg/dL Final    Glucose, UA 10/12/2023 Negative  Negative mg/dL Final    Ketones, UA 10/12/2023 Negative  Negative Final    Urobilinogen, UA 10/12/2023 Normal  Normal, 0.2 E.U./dL Final    Bilirubin 10/12/2023 1 mg/dL (A)  Negative Final    Blood, UA 10/12/2023 Negative  Negative Final    Lot Number 10/12/2023 [id number]   Final    Expiration Date 10/12/2023 93,024   Final    Urine Culture 10/12/2023 Final report   Final    Result 1 10/12/2023 Comment   Final    Comment: Mixed urogenital dale  50,000-100,000 colony forming units per mL           Assessment & Plan   Problems Addressed this Visit    None  Visit Diagnoses       Generalized anxiety disorder  (Chronic)   -  Primary    Relevant Medications    mirtazapine (Remeron) 15 MG tablet    Sleep difficulties  (Chronic)       Relevant Medications    mirtazapine (Remeron) 15 MG tablet          Diagnoses         Codes Comments    Generalized anxiety disorder    -  Primary ICD-10-CM: F41.1  ICD-9-CM: 300.02     Sleep difficulties     ICD-10-CM: G47.9  ICD-9-CM: 780.50             Visit Diagnoses:    ICD-10-CM ICD-9-CM   1. Generalized anxiety disorder  F41.1 300.02   2. Sleep difficulties  G47.9 780.50             Rule out MDD       GOALS:  Short Term Goals: Patient will be compliant with medication, and patient will have no significant medication related side effects.  Patient will be engaged in psychotherapy as indicated.  Patient will report subjective improvement of symptoms.  Long term goals: To stabilize mood and treat/improve subjective symptoms, the patient will stay out of the hospital, the patient will be at an optimal level of functioning, and the patient will take all medications as  prescribed.  The patient verbalized understanding and agreement with goals that were mutually set.      TREATMENT PLAN:   Continue supportive psychotherapy efforts and medications as indicated.   -Continue Lexapro 5 mg PO Daily for anxiety  -Continue Remeron 7.5-15 mg PO QHS for sleep. Discussed with pt and her son to lie down approximately 15 minutes after taking to avoid any possibly instability with mobility.     Medication and treatment options, both pharmacological and non-pharmacological treatment options, discussed during today's visit, including any off label use of medication. Patient acknowledged and verbally consented with current treatment plan and was educated on the importance of compliance with treatment and follow-up appointments.       MEDICATION ISSUES:    Discussed treatment plan and medication options of prescribed medication as well as the risks, benefits, any black box warnings, and side effects including potential falls, possible impaired driving, and metabolic adversities among others, including any off label use of medication. Patient is agreeable to call the office with any worsening of symptoms or onset of side effects, or if any concerns or questions arise.  The contact information for the office is made available to the patient. They may call the [facility] at [phone]. Patient is agreeable to call 446 or go to the nearest ER should they begin having any SI/HI, or if any urgent concerns arise.        MEDS ORDERED DURING VISIT:  New Medications Ordered This Visit   Medications    mirtazapine (Remeron) 15 MG tablet     Sig: Take 1/2-1 tablet PO QHS for sleep. The 1/2 tablet is more sedating than the whole tablet.     Dispense:  90 tablet     Refill:  0       Return in about 8 weeks (around 2/28/2024), or if symptoms worsen or fail to improve, for Recheck.     Treatment plan completed: 10/25/23    Progress toward goal: Not at goal    Functional Status: Mild  impairment     Prognosis: Good with Ongoing Treatment       This patient will not be seen for the in person visits due to the following exception(s): It would be a hardship for this patient to see a provider in person.    It is my professional opinion that the patient is clinically stable and an in person visit will risk worsening the patient's condition creating undue hardship.           This document has been electronically signed by TOÑO Anthony  January 3, 2024 14:06 EST    Some of the data in this electronic note has been brought forward from a previous encounter, any necessary changes have been made, it has been reviewed by this APRN, and it is accurate.    Please note that portions of this note were completed with a voice recognition program. Efforts were made to edit dictation, but occasionally words are mistranscribed.

## 2024-01-11 ENCOUNTER — TELEPHONE (OUTPATIENT)
Dept: NEUROLOGY | Facility: CLINIC | Age: 89
End: 2024-01-11
Payer: MEDICARE

## 2024-01-11 ENCOUNTER — OFFICE VISIT (OUTPATIENT)
Dept: FAMILY MEDICINE CLINIC | Facility: CLINIC | Age: 89
End: 2024-01-11
Payer: MEDICARE

## 2024-01-11 VITALS
WEIGHT: 127 LBS | HEART RATE: 75 BPM | HEIGHT: 63 IN | SYSTOLIC BLOOD PRESSURE: 140 MMHG | OXYGEN SATURATION: 95 % | DIASTOLIC BLOOD PRESSURE: 80 MMHG | BODY MASS INDEX: 22.5 KG/M2

## 2024-01-11 DIAGNOSIS — E03.9 ACQUIRED HYPOTHYROIDISM: ICD-10-CM

## 2024-01-11 DIAGNOSIS — R41.89 COGNITIVE DECLINE: ICD-10-CM

## 2024-01-11 DIAGNOSIS — F41.9 ANXIETY: Primary | ICD-10-CM

## 2024-01-11 DIAGNOSIS — I10 PRIMARY HYPERTENSION: ICD-10-CM

## 2024-01-11 PROCEDURE — 99214 OFFICE O/P EST MOD 30 MIN: CPT | Performed by: FAMILY MEDICINE

## 2024-01-11 RX ORDER — LEVOTHYROXINE SODIUM 88 MCG
88 TABLET ORAL DAILY
Qty: 90 TABLET | Refills: 1 | Status: SHIPPED | OUTPATIENT
Start: 2024-01-11

## 2024-01-11 RX ORDER — METOPROLOL SUCCINATE 50 MG/1
50 TABLET, EXTENDED RELEASE ORAL DAILY
Qty: 90 TABLET | Refills: 0 | Status: SHIPPED | OUTPATIENT
Start: 2024-01-11

## 2024-01-11 NOTE — PROGRESS NOTES
Follow Up Office Visit      Date of Visit:  2024   Patient Name: Katie Vaughn  : 1935   MRN: [id number]     Chief Complaint:    Chief Complaint   Patient presents with    ALTERED MENTAL STATUS FOLLOW UP       History of Present Illness: Katie Vaughn is a 88 y.o. female who is here today for follow up.  Patient following up currently for her previous altered mental status.  Patient was also in the follow-up on her hypertension and hypothyroidism.  Patient has seen mental health.  Medication adjustments have been reviewed and noted.  Overall mental status has improved.  Patient has more of a anxiety and depression causing her cognitive decline.  Now much improved.  No side effects from her medications.  Patient also needs refills and recheck on her hypertension and hypothyroidism.        Subjective      Review of Systems:   Review of Systems   Constitutional:  Negative for fatigue and fever.   HENT:  Negative for congestion and ear pain.    Respiratory:  Negative for apnea, cough, chest tightness and shortness of breath.    Cardiovascular:  Negative for chest pain.   Gastrointestinal:  Negative for abdominal pain, constipation, diarrhea and nausea.   Musculoskeletal:  Negative for arthralgias.   Psychiatric/Behavioral:  Negative for depressed mood and stress.        Past Medical History:   Past Medical History:   Diagnosis Date    Anxiety     Cataract     Disease of thyroid gland     Diverticulitis     Ovarian cancer 2003    Positive TB test        Past Surgical History:   Past Surgical History:   Procedure Laterality Date    APPENDECTOMY      CHOLECYSTECTOMY      HYSTERECTOMY         Family History:   Family History   Problem Relation Age of Onset    Lung cancer Mother     Colon cancer Paternal Aunt     Colon cancer Maternal Uncle     Depression Sister     Suicide Attempts Nephew     Drug abuse Nephew     OCD Grandson        Social History:   Social History     Socioeconomic History    Marital  "status:    Tobacco Use    Smoking status: Former     Packs/day: 1.00     Years: 15.00     Additional pack years: 0.00     Total pack years: 15.00     Types: Cigarettes     Quit date:      Years since quittin.0    Smokeless tobacco: Never   Vaping Use    Vaping Use: Never used   Substance and Sexual Activity    Alcohol use: No    Drug use: No    Sexual activity: Defer       Medications:     Current Outpatient Medications:     metoprolol succinate XL (TOPROL-XL) 50 MG 24 hr tablet, Take 1 tablet by mouth Daily., Disp: 90 tablet, Rfl: 0    Synthroid 88 MCG tablet, Take 1 tablet by mouth Daily. Give name brand, Disp: 90 tablet, Rfl: 1    dicyclomine (BENTYL) 10 MG capsule, Take 1 capsule by mouth 3 (Three) Times a Day Before Meals. (Patient taking differently: Take 1 capsule by mouth 3 (Three) Times a Day Before Meals. PRN), Disp: 90 capsule, Rfl: 1    escitalopram (Lexapro) 5 MG tablet, Take 1 tablet by mouth Daily., Disp: 90 tablet, Rfl: 0    MELATONIN PO, Take 10 mg by mouth Every Night., Disp: , Rfl:     mirtazapine (Remeron) 15 MG tablet, Take 1/2-1 tablet PO QHS for sleep. The 1/2 tablet is more sedating than the whole tablet., Disp: 90 tablet, Rfl: 0    Allergies:   Allergies   Allergen Reactions    Latex Itching, Dermatitis and Rash    Betadine [Povidone Iodine] Itching     Pt says her skin also blistered    Sulfa Antibiotics Swelling    Penicillins Rash       Objective     Physical Exam:  Vital Signs:   Vitals:    24 1411   BP: 140/80   Pulse: 75   SpO2: 95%   Weight: 57.6 kg (127 lb)   Height: 160 cm (63\")     Body mass index is 22.5 kg/m².     Physical Exam  Vitals and nursing note reviewed.   Constitutional:       General: She is not in acute distress.     Appearance: Normal appearance. She is not ill-appearing.   HENT:      Head: Normocephalic and atraumatic.      Right Ear: Tympanic membrane and ear canal normal.      Left Ear: Tympanic membrane and ear canal normal.      Nose: Nose " normal.   Cardiovascular:      Rate and Rhythm: Normal rate and regular rhythm.      Heart sounds: Normal heart sounds.   Pulmonary:      Effort: Pulmonary effort is normal.      Breath sounds: Normal breath sounds.   Neurological:      Mental Status: She is alert and oriented to person, place, and time. Mental status is at baseline.   Psychiatric:         Mood and Affect: Mood normal.         Procedures      Assessment / Plan      Assessment/Plan:   Diagnoses and all orders for this visit:    1. Anxiety (Primary)    2. Acquired hypothyroidism  -     Synthroid 88 MCG tablet; Take 1 tablet by mouth Daily. Give name brand  Dispense: 90 tablet; Refill: 1    3. Primary hypertension  -     metoprolol succinate XL (TOPROL-XL) 50 MG 24 hr tablet; Take 1 tablet by mouth Daily.  Dispense: 90 tablet; Refill: 0    4. Cognitive decline         No current changes made in her medications but did refill her metoprolol and Synthroid.  Overall conditions much improved.    Follow Up:   No follow-ups on file.    Dmitry Nicole  [facility]

## 2024-01-11 NOTE — TELEPHONE ENCOUNTER
----- Message from Katie Vaughn sent at 1/11/2024  3:46 PM EST -----  Regarding: Appointment Cancellation Request  Contact: [phone]  YarelyFREDERICK Vaughn would like to cancel the following appointments:    Kimberly Josue in [facility] ([phone]), 1/16/2024  2:00 PM    Comments:  After a followup visit with my primary care physician (Dr. Dmitry Nicole), he feels that I am no longer in need of neurological evaluation. Thank you  Katie Vaughn

## 2024-03-11 ENCOUNTER — OFFICE VISIT (OUTPATIENT)
Dept: FAMILY MEDICINE CLINIC | Facility: CLINIC | Age: 89
End: 2024-03-11
Payer: MEDICARE

## 2024-03-11 ENCOUNTER — TELEPHONE (OUTPATIENT)
Dept: PSYCHIATRY | Facility: CLINIC | Age: 89
End: 2024-03-11
Payer: MEDICARE

## 2024-03-11 VITALS
HEIGHT: 63 IN | HEART RATE: 90 BPM | OXYGEN SATURATION: 95 % | DIASTOLIC BLOOD PRESSURE: 86 MMHG | BODY MASS INDEX: 21.97 KG/M2 | WEIGHT: 124 LBS | TEMPERATURE: 98 F | SYSTOLIC BLOOD PRESSURE: 144 MMHG

## 2024-03-11 DIAGNOSIS — I10 PRIMARY HYPERTENSION: ICD-10-CM

## 2024-03-11 DIAGNOSIS — R61 NIGHT SWEATS: Primary | ICD-10-CM

## 2024-03-11 DIAGNOSIS — R10.32 LEFT LOWER QUADRANT ABDOMINAL PAIN: ICD-10-CM

## 2024-03-11 LAB
BILIRUB BLD-MCNC: ABNORMAL MG/DL
CLARITY, POC: CLEAR
COLOR UR: YELLOW
GLUCOSE UR STRIP-MCNC: NEGATIVE MG/DL
KETONES UR QL: NEGATIVE
LEUKOCYTE EST, POC: NEGATIVE
NITRITE UR-MCNC: NEGATIVE MG/ML
PH UR: 5.5 [PH] (ref 5–8)
PROT UR STRIP-MCNC: ABNORMAL MG/DL
RBC # UR STRIP: ABNORMAL /UL
SP GR UR: 1.02 (ref 1–1.03)
UROBILINOGEN UR QL: ABNORMAL

## 2024-03-11 PROCEDURE — 81002 URINALYSIS NONAUTO W/O SCOPE: CPT | Performed by: FAMILY MEDICINE

## 2024-03-11 PROCEDURE — 99214 OFFICE O/P EST MOD 30 MIN: CPT | Performed by: FAMILY MEDICINE

## 2024-03-11 NOTE — PROGRESS NOTES
Follow Up Office Visit      Date of Visit:  2024   Patient Name: Katie Vaughn  : 1935   MRN: [id number]     Chief Complaint:    Chief Complaint   Patient presents with    Night Sweats     Night sweats going on since last week     Abdominal Pain     Pt states her belly feels sore started last week        History of Present Illness: Katie Vaughn is a 88 y.o. female who is here today for follow up.  Patient seen today for 2 separate issues.  Patient presents with 4 to 5-day history of some left lower quadrant abdominal discomfort.  Initially had diarrhea last week.  No nausea or vomiting.  Symptoms resolved with some Imodium and then developed some constipation.  Abdominal pain is somewhat better.  No fever noted.  Night sweats seem to be a totally separate issue.  They were present before any of this started with her belly.  They did somewhat worsen during the week last week.  She may have had a fever.        Subjective      Review of Systems:   Review of Systems   Constitutional:  Negative for fatigue and fever.   HENT:  Negative for congestion and ear pain.    Respiratory:  Negative for apnea, cough, chest tightness and shortness of breath.    Cardiovascular:  Negative for chest pain.   Gastrointestinal:  Positive for abdominal pain. Negative for constipation, diarrhea and nausea.   Musculoskeletal:  Negative for arthralgias.   Psychiatric/Behavioral:  Negative for depressed mood and stress.        Past Medical History:   Past Medical History:   Diagnosis Date    Anxiety     Cataract     Disease of thyroid gland     Diverticulitis     Ovarian cancer     Positive TB test        Past Surgical History:   Past Surgical History:   Procedure Laterality Date    APPENDECTOMY      CHOLECYSTECTOMY      HYSTERECTOMY         Family History:   Family History   Problem Relation Age of Onset    Lung cancer Mother     Colon cancer Paternal Aunt     Colon cancer Maternal Uncle     Depression Sister      "Suicide Attempts Nephew     Drug abuse Nephew     OCD Grandson        Social History:   Social History     Socioeconomic History    Marital status:    Tobacco Use    Smoking status: Former     Current packs/day: 0.00     Average packs/day: 1 pack/day for 15.0 years (15.0 ttl pk-yrs)     Types: Cigarettes     Start date:      Quit date:      Years since quittin.2    Smokeless tobacco: Never   Vaping Use    Vaping status: Never Used   Substance and Sexual Activity    Alcohol use: No    Drug use: No    Sexual activity: Defer       Medications:     Current Outpatient Medications:     dicyclomine (BENTYL) 10 MG capsule, Take 1 capsule by mouth 3 (Three) Times a Day Before Meals. (Patient taking differently: Take 1 capsule by mouth 3 (Three) Times a Day Before Meals. PRN), Disp: 90 capsule, Rfl: 1    escitalopram (Lexapro) 5 MG tablet, Take 1 tablet by mouth Daily., Disp: 90 tablet, Rfl: 0    metoprolol succinate XL (TOPROL-XL) 50 MG 24 hr tablet, Take 1 tablet by mouth Daily., Disp: 90 tablet, Rfl: 0    mirtazapine (Remeron) 15 MG tablet, Take 1/2-1 tablet PO QHS for sleep. The 1/2 tablet is more sedating than the whole tablet., Disp: 90 tablet, Rfl: 0    Synthroid 88 MCG tablet, Take 1 tablet by mouth Daily. Give name brand, Disp: 90 tablet, Rfl: 1    MELATONIN PO, Take 10 mg by mouth Every Night., Disp: , Rfl:     Allergies:   Allergies   Allergen Reactions    Latex Itching, Dermatitis and Rash    Betadine [Povidone Iodine] Itching     Pt says her skin also blistered    Sulfa Antibiotics Swelling    Penicillins Rash       Objective     Physical Exam:  Vital Signs:   Vitals:    24 1120   BP: 144/86   Pulse: 90   Temp: 98 °F (36.7 °C)   SpO2: 95%   Weight: 56.2 kg (124 lb)   Height: 160 cm (63\")     Body mass index is 21.97 kg/m².     Physical Exam  Vitals and nursing note reviewed.   Constitutional:       General: She is not in acute distress.     Appearance: Normal appearance. She is not " ill-appearing.   HENT:      Head: Normocephalic and atraumatic.      Right Ear: Tympanic membrane and ear canal normal.      Left Ear: Tympanic membrane and ear canal normal.      Nose: Nose normal.   Cardiovascular:      Rate and Rhythm: Normal rate and regular rhythm.      Heart sounds: Normal heart sounds.   Pulmonary:      Effort: Pulmonary effort is normal.      Breath sounds: Normal breath sounds.   Neurological:      Mental Status: She is alert and oriented to person, place, and time. Mental status is at baseline.   Psychiatric:         Mood and Affect: Mood normal.         Procedures      Assessment / Plan      Assessment/Plan:   Diagnoses and all orders for this visit:    1. Night sweats (Primary)  -     TSH    2. Left lower quadrant abdominal pain  -     CBC Auto Differential  -     Comprehensive Metabolic Panel  -     Urine Culture - Urine, Urine, Clean Catch  -     POC Urinalysis Dipstick    3. Primary hypertension  -     Lipid Panel         Check blood work due to the night sweats as well as the abdominal discomfort.  Urinalysis did not show a lot.  Will culture.  Has upcoming appointment for her hypertension so check her lipids as well today.  She may have had a small bout of diverticulitis or gastrointestinal bug.  We chose no treatment today since she is improving.  If her white count is elevated or pain worsens we may have to obtain a CT scan of her abdomen.  Evaluate blood work for other causes of night sweats.    Follow Up:   No follow-ups on file.    Dmitry Nicole  [facility]

## 2024-03-11 NOTE — TELEPHONE ENCOUNTER
Made pt son aware. Pt son also has gotten pt scheduled with pt PCP today and states that he will check her medications.

## 2024-03-11 NOTE — TELEPHONE ENCOUNTER
It can be but usually this happens early if side effects. I would recommend her getting a PCP appointment and checking her lab work as she may have an infection or deficiency in some vitamins. Please let them know we will tell her provider as well when she is back in the office.

## 2024-03-11 NOTE — TELEPHONE ENCOUNTER
Ok thank you, also please let him know to make sure her medications haven't been mixed up or they have changed any from what she was previously on.

## 2024-03-11 NOTE — TELEPHONE ENCOUNTER
Pt son called and states that the pt has been having heavy perspiration during night to the point of having to change her clothes a couple times a night. Pt is concerned that it may be a side effect of one of the medications. Pt son also states for the last 6 days the pt has also been experiencing some weakness and jittery wasn't sure if this is something else or if it was a side effect. Pt son states that the weakness and the jittery filling doesn't last all day.    Provider out of office.

## 2024-03-12 ENCOUNTER — TELEPHONE (OUTPATIENT)
Dept: FAMILY MEDICINE CLINIC | Facility: CLINIC | Age: 89
End: 2024-03-12
Payer: MEDICARE

## 2024-03-12 LAB
ALBUMIN SERPL-MCNC: 4.3 G/DL (ref 3.7–4.7)
ALBUMIN/GLOB SERPL: 2 {RATIO} (ref 1.2–2.2)
ALP SERPL-CCNC: 66 IU/L (ref 44–121)
ALT SERPL-CCNC: 11 IU/L (ref 0–32)
AST SERPL-CCNC: 19 IU/L (ref 0–40)
BASOPHILS # BLD AUTO: 0.1 X10E3/UL (ref 0–0.2)
BASOPHILS NFR BLD AUTO: 1 %
BILIRUB SERPL-MCNC: 0.4 MG/DL (ref 0–1.2)
BUN SERPL-MCNC: 12 MG/DL (ref 8–27)
BUN/CREAT SERPL: 12 (ref 12–28)
CALCIUM SERPL-MCNC: 9.3 MG/DL (ref 8.7–10.3)
CHLORIDE SERPL-SCNC: 101 MMOL/L (ref 96–106)
CHOLEST SERPL-MCNC: 182 MG/DL (ref 100–199)
CO2 SERPL-SCNC: 25 MMOL/L (ref 20–29)
CREAT SERPL-MCNC: 1.04 MG/DL (ref 0.57–1)
EGFRCR SERPLBLD CKD-EPI 2021: 52 ML/MIN/1.73
EOSINOPHIL # BLD AUTO: 0.2 X10E3/UL (ref 0–0.4)
EOSINOPHIL NFR BLD AUTO: 3 %
ERYTHROCYTE [DISTWIDTH] IN BLOOD BY AUTOMATED COUNT: 12.4 % (ref 11.7–15.4)
GLOBULIN SER CALC-MCNC: 2.1 G/DL (ref 1.5–4.5)
GLUCOSE SERPL-MCNC: 90 MG/DL (ref 70–99)
HCT VFR BLD AUTO: 42.6 % (ref 34–46.6)
HDLC SERPL-MCNC: 44 MG/DL
HGB BLD-MCNC: 13.9 G/DL (ref 11.1–15.9)
IMM GRANULOCYTES # BLD AUTO: 0 X10E3/UL (ref 0–0.1)
IMM GRANULOCYTES NFR BLD AUTO: 0 %
LDLC SERPL CALC-MCNC: 120 MG/DL (ref 0–99)
LYMPHOCYTES # BLD AUTO: 1.1 X10E3/UL (ref 0.7–3.1)
LYMPHOCYTES NFR BLD AUTO: 17 %
MCH RBC QN AUTO: 30 PG (ref 26.6–33)
MCHC RBC AUTO-ENTMCNC: 32.6 G/DL (ref 31.5–35.7)
MCV RBC AUTO: 92 FL (ref 79–97)
MONOCYTES # BLD AUTO: 0.6 X10E3/UL (ref 0.1–0.9)
MONOCYTES NFR BLD AUTO: 10 %
NEUTROPHILS # BLD AUTO: 4.4 X10E3/UL (ref 1.4–7)
NEUTROPHILS NFR BLD AUTO: 69 %
PLATELET # BLD AUTO: 224 X10E3/UL (ref 150–450)
POTASSIUM SERPL-SCNC: 4.2 MMOL/L (ref 3.5–5.2)
PROT SERPL-MCNC: 6.4 G/DL (ref 6–8.5)
RBC # BLD AUTO: 4.64 X10E6/UL (ref 3.77–5.28)
SODIUM SERPL-SCNC: 140 MMOL/L (ref 134–144)
TRIGL SERPL-MCNC: 98 MG/DL (ref 0–149)
TSH SERPL DL<=0.005 MIU/L-ACNC: 3.5 UIU/ML (ref 0.45–4.5)
VLDLC SERPL CALC-MCNC: 18 MG/DL (ref 5–40)
WBC # BLD AUTO: 6.3 X10E3/UL (ref 3.4–10.8)

## 2024-03-12 NOTE — TELEPHONE ENCOUNTER
"Hub tried to call and transfer pt's son regarding labs.   Hub relay: \"her labs are not back yet, if you would call back around noon that'd be appreciated.\"  "

## 2024-03-12 NOTE — TELEPHONE ENCOUNTER
Hub staff attempted to follow warm transfer process and was unsuccessful     Caller: JAY CHANCE    Relationship to patient: Child    Best call back number: [phone]     Patient is needing: HE SAID THEY WERE TOLD TO CALL THIS MORNING TO SEE IF THERE WERE ANY CONCERNS WITH HER LABS FROM HER VISIT YESTERDAY.    PLEASE CALL AND ADVISE. HE IS NOT ON THE  [facility]

## 2024-03-13 LAB
BACTERIA UR CULT: NORMAL
BACTERIA UR CULT: NORMAL

## 2024-03-14 NOTE — TELEPHONE ENCOUNTER
Labs came back and were actually fine.  Does not appear to be a serious infection.  I think is okay to not pursue further testing at this point as long as she is getting better.

## 2024-03-14 NOTE — TELEPHONE ENCOUNTER
It is possible that pt is having increased anxiety. However, per pt's medical chart, she also has a history of hypertension (HTN) and tachycardia. I would recommend keeping a log of pt's BP and HR at different times of the day, such as morning, afternoon, and evening, and have it at her appointment with me on 3/20/24.

## 2024-03-14 NOTE — TELEPHONE ENCOUNTER
Pt son called and states the lab work came back good. Pt son states that the pt told him that her blood pressure was up one morning was 155/87 and pt heart was racing. Pt blood pressure was 130/78 today. Pt son is wondering if it could be anxiety.  Pt still concerned about the night sweats and that has been every night for about 9 days.    Please advise.    Gabriel  [phone]

## 2024-03-20 ENCOUNTER — TELEMEDICINE (OUTPATIENT)
Dept: PSYCHIATRY | Facility: CLINIC | Age: 89
End: 2024-03-20
Payer: MEDICARE

## 2024-03-20 DIAGNOSIS — F41.1 GENERALIZED ANXIETY DISORDER: Chronic | ICD-10-CM

## 2024-03-20 DIAGNOSIS — F41.1 GENERALIZED ANXIETY DISORDER: Primary | Chronic | ICD-10-CM

## 2024-03-20 DIAGNOSIS — G47.9 SLEEP DIFFICULTIES: Chronic | ICD-10-CM

## 2024-03-20 PROCEDURE — 1159F MED LIST DOCD IN RCRD: CPT | Performed by: NURSE PRACTITIONER

## 2024-03-20 PROCEDURE — 99214 OFFICE O/P EST MOD 30 MIN: CPT | Performed by: NURSE PRACTITIONER

## 2024-03-20 PROCEDURE — 1160F RVW MEDS BY RX/DR IN RCRD: CPT | Performed by: NURSE PRACTITIONER

## 2024-03-20 RX ORDER — ESCITALOPRAM OXALATE 5 MG/1
5 TABLET ORAL DAILY
Qty: 90 TABLET | Refills: 0 | Status: SHIPPED | OUTPATIENT
Start: 2024-03-20

## 2024-03-20 NOTE — PROGRESS NOTES
This provider is completing this appointment through [facility] (through [facility]), [address], [facility], [address] using a secure [employer] Video Visit through [employer]. Patient is being seen remotely via telehealth at their residence in [place], and stated they are in a secure environment for this session. The patient's condition being diagnosed/treated is appropriate for telemedicine. The provider identified herself as well as her credentials.   The patient, and/or patients guardian, consent to be seen remotely, and when consent is given they understand that the consent allows for patient identifiable information to be sent to a third party as needed.   They may refuse to be seen remotely at any time. The electronic data is encrypted and password protected, and the patient and/or guardian has been advised of the potential risks to privacy not withstanding such measures.    You have chosen to receive care through a telehealth visit.  Do you consent to use a video/audio connection for your medical care today? Yes    Patient identifiers utilized: Name and date of birth.        Subjective   Katie Vaughn is a 88 y.o. female who presents today for follow up    Chief Complaint:  Anxiety and sleep difficulties    Accompanied by: Pt's oldest son, Gabriel, and his wife, Tiffanie, are present during appointment with pt's verbal permission.    History of Present Illness:   *Pt was last seen by this APRN on 1/3/24. While this APRN was out of the office on 3/11/24, pt's son called and stated that pt has been experiencing night sweats to the point she has to change clothes. He also reported pt experiencing some weakness and feeling jittery that did not last all day. The covering provider recommended pt have labs completed as side effects usually occur early on with medications. Pt's son called back on 3/14/24 stating pt's labs were relatively normal with no concerns. He adds that pt told  "him that her BP was elevated at 155/87 one morning with her heart racing. Her BP on this day was reported to be 130/78. This APRN recommended pt keep a log of her BP at HR at different times of the day, such as morning, afternoon, and evening due to pt having a history of HTN and tachycardia. It could also be anxiety related.*    Pt continues to feel anxious at times; it is not constant. Anxiety mostly occurs in the mornings. It can also happen during the day if her  \"doesn't cooperate\". Pt laughs as she says this. Mood has been stable. Appetite has been stable. Pt states x2-3 weeks ago she had episodes of diarrhea. Pt has been experiencing night sweats on the front of her body. She reports they started off occurring occasionally and now they are nearly every night. She believes they started a few months ago. Pt gets up at night to change her gown. Pt initially thought it was her flannel sheets, but she has since started using a different sheets and it continues. Pt reports having a few episodes of HTN in the past week: 153/84, 143/80, and 150/76. Pt's HR has been in the 70's-80's. Discussed with pt that both the Remeron and the Lexapro have the potential to cause night sweats. Remeron was the most recently added medication. Recommended discontinuing the Remeron at this time to see if this is the source of the night sweats. Pt is concerned she will not be able to sleep without it. This APRN recommended she try OTC Melatonin 10 mg PO QHS PRN for sleep. If this proves to be ineffective, to try OTC magnesium glycinate. If this is also ineffective, advised pt and her son to call this APRN next week and we may add a small dose of Seroquel 12.5-25 mg PO QHS for sleep. The patient reports compliance with current medication regimen. The patient denies any suicidal or homicidal ideations, plans, or intent at today's encounter and is convincing. The patient denies any auditory hallucinations or visual hallucinations. " The patient does not endorse any significant symptoms consistent with will or psychosis at today's encounter.     *If the patient has any concerns or needs assistance, they may call the [facility] at [phone]*        Prior Psychiatric Medications:  Klonopin  Zoloft - GI disturbance, took for a few days  Trazodone - ineffective at 25 mg  Ativan - did well on it in the past   Doxepin - 10-20 mg ineffective for sleep  Elavil      The following portions of the patient's history were reviewed and updated as appropriate: allergies, current medications, past family history, past medical history, past social history, past surgical history and problem list.          Past Medical History:  Past Medical History:   Diagnosis Date    Anxiety     Cataract     Disease of thyroid gland     Diverticulitis     Ovarian cancer 2003    Positive TB test        Social History:  Social History     Socioeconomic History    Marital status:    Tobacco Use    Smoking status: Former     Current packs/day: 0.00     Average packs/day: 1 pack/day for 15.0 years (15.0 ttl pk-yrs)     Types: Cigarettes     Start date:      Quit date:      Years since quittin.2    Smokeless tobacco: Never   Vaping Use    Vaping status: Never Used   Substance and Sexual Activity    Alcohol use: No    Drug use: No    Sexual activity: Defer       Family History:  Family History   Problem Relation Age of Onset    Lung cancer Mother     Colon cancer Paternal Aunt     Colon cancer Maternal Uncle     Depression Sister     Suicide Attempts Nephew     Drug abuse Nephew     OCD Grandson        Past Surgical History:  Past Surgical History:   Procedure Laterality Date    APPENDECTOMY      CHOLECYSTECTOMY      HYSTERECTOMY         Problem List:  Patient Active Problem List   Diagnosis    Diffuse large B-cell lymphoma of lymph nodes of multiple regions    Encounter for antineoplastic chemotherapy       Allergy:   Allergies    Allergen Reactions    Latex Itching, Dermatitis and Rash    Betadine [Povidone Iodine] Itching     Pt says her skin also blistered    Sulfa Antibiotics Swelling    Penicillins Rash        Current Medications:   Current Outpatient Medications   Medication Sig Dispense Refill    dicyclomine (BENTYL) 10 MG capsule Take 1 capsule by mouth 3 (Three) Times a Day Before Meals. (Patient taking differently: Take 1 capsule by mouth 3 (Three) Times a Day Before Meals. PRN) 90 capsule 1    escitalopram (Lexapro) 5 MG tablet Take 1 tablet by mouth Daily. 90 tablet 0    MELATONIN PO Take 10 mg by mouth Every Night.      metoprolol succinate XL (TOPROL-XL) 50 MG 24 hr tablet Take 1 tablet by mouth Daily. 90 tablet 0    Synthroid 88 MCG tablet Take 1 tablet by mouth Daily. Give name brand 90 tablet 1     No current facility-administered medications for this visit.       Review of Symptoms:    Review of Systems   Constitutional:  Positive for fatigue.   Psychiatric/Behavioral:  The patient is nervous/anxious.         Sleep disturbance related to night sweats         Physical Exam:   Due to the remote nature of this encounter (virtual encounter), vitals were unable to be obtained.  Height stated at 63 inches.  Weight stated at 124 pounds.      Physical Exam  Neurological:      Mental Status: She is alert.   Psychiatric:         Attention and Perception: Attention and perception normal.         Mood and Affect: Mood and affect normal.         Speech: Speech normal.         Behavior: Behavior normal. Behavior is cooperative.         Thought Content: Thought content normal. Thought content is not paranoid or delusional. Thought content does not include homicidal or suicidal ideation. Thought content does not include homicidal or suicidal plan.         Cognition and Memory: Cognition and memory normal.         Judgment: Judgment normal.           Mental Status Exam:   Hygiene:   good  Cooperation:  Cooperative  Eye Contact:   Good  Psychomotor Behavior:  Appropriate  Affect:  Full range  Mood: normal  Speech:  Normal  Thought Process:  Linear  Thought Content:  Normal  Suicidal:  None  Homicidal:  None  Hallucinations:  None  Delusion:  None  Memory:  Intact  Orientation:  Person, Place, Time, and Situation  Reliability:  good  Insight:  Good  Judgement:  Good  Impulse Control:  Good        PHQ-9 Depression Screening  Little interest or pleasure in doing things? (P) 0-->not at all   Feeling down, depressed, or hopeless? (P) 1-->several days   Trouble falling or staying asleep, or sleeping too much? (P) 1-->several days   Feeling tired or having little energy? (P) 2-->more than half the days   Poor appetite or overeating? (P) 0-->not at all   Feeling bad about yourself - or that you are a failure or have let yourself or your family down? (P) 0-->not at all   Trouble concentrating on things, such as reading the newspaper or watching television? (P) 1-->several days   Moving or speaking so slowly that other people could have noticed? Or the opposite - being so fidgety or restless that you have been moving around a lot more than usual? (P) 1-->several days   Thoughts that you would be better off dead, or of hurting yourself in some way? (P) 0-->not at all   PHQ-9 Total Score (P) 6   If you checked off any problems, how difficult have these problems made it for you to do your work, take care of things at home, or get along with other people? (P) not difficult at all     PHQ-9 Total Score: (P) 6      YASMINE-7  Feeling nervous, anxious or on edge: (P) Several days  Not being able to stop or control worrying: (P) Several days  Worrying too much about different things: (P) Not at all  Trouble Relaxing: (P) Not at all  Being so restless that it is hard to sit still: (P) Not at all  Feeling afraid as if something awful might happen: (P) Not at all  Becoming easily annoyed or irritable: (P) Several days  YASMINE 7 Total Score: (P) 3  If you checked any  problems, how difficult have these problems made it for you to do your work, take care of things at home, or get along with other people: (P) Not difficult at all    Previous Provider notes and available records reviewed by this APRN at today's encounter.         Lab Results:   Office Visit on 03/11/2024   Component Date Value Ref Range Status    Glucose 03/11/2024 90  70 - 99 mg/dL Final    BUN 03/11/2024 12  8 - 27 mg/dL Final    Creatinine 03/11/2024 1.04 (H)  0.57 - 1.00 mg/dL Final    EGFR Result 03/11/2024 52 (L)  >59 mL/min/1.73 Final    BUN/Creatinine Ratio 03/11/2024 12  12 - 28 Final    Sodium 03/11/2024 140  134 - 144 mmol/L Final    Potassium 03/11/2024 4.2  3.5 - 5.2 mmol/L Final    Chloride 03/11/2024 101  96 - 106 mmol/L Final    Total CO2 03/11/2024 25  20 - 29 mmol/L Final    Calcium 03/11/2024 9.3  8.7 - 10.3 mg/dL Final    Total Protein 03/11/2024 6.4  6.0 - 8.5 g/dL Final    Albumin 03/11/2024 4.3  3.7 - 4.7 g/dL Final    Globulin 03/11/2024 2.1  1.5 - 4.5 g/dL Final    A/G Ratio 03/11/2024 2.0  1.2 - 2.2 Final    Total Bilirubin 03/11/2024 0.4  0.0 - 1.2 mg/dL Final    Alkaline Phosphatase 03/11/2024 66  44 - 121 IU/L Final    AST (SGOT) 03/11/2024 19  0 - 40 IU/L Final    ALT (SGPT) 03/11/2024 11  0 - 32 IU/L Final    TSH 03/11/2024 3.500  0.450 - 4.500 uIU/mL Final    Urine Culture 03/11/2024 Final report   Final    Result 1 03/11/2024 Comment   Final    Comment: Mixed urogenital dale  Less than 10,000 colonies/mL      Color 03/11/2024 Yellow  Yellow, Straw, Dark Yellow, Delfina Final    Clarity, UA 03/11/2024 Clear  Clear Final    Glucose, UA 03/11/2024 Negative  Negative mg/dL Final    Bilirubin 03/11/2024 Small (1+) (A)  Negative Final    Ketones, UA 03/11/2024 Negative  Negative Final    Specific Gravity  03/11/2024 1.025  1.005 - 1.030 Final    Blood, UA 03/11/2024 1+ (A)  Negative Final    pH, Urine 03/11/2024 5.5  5.0 - 8.0 Final    Protein, POC 03/11/2024 Trace (A)  Negative mg/dL Final     Urobilinogen, UA 03/11/2024 0.2 E.U./dL  Normal, 0.2 E.U./dL Final    Leukocytes 03/11/2024 Negative  Negative Final    Nitrite, UA 03/11/2024 Negative  Negative Final    WBC 03/11/2024 6.3  3.4 - 10.8 x10E3/uL Final    RBC 03/11/2024 4.64  3.77 - 5.28 x10E6/uL Final    Hemoglobin 03/11/2024 13.9  11.1 - 15.9 g/dL Final    Hematocrit 03/11/2024 42.6  34.0 - 46.6 % Final    MCV 03/11/2024 92  79 - 97 fL Final    MCH 03/11/2024 30.0  26.6 - 33.0 pg Final    MCHC 03/11/2024 32.6  31.5 - 35.7 g/dL Final    RDW 03/11/2024 12.4  11.7 - 15.4 % Final    Platelets 03/11/2024 224  150 - 450 x10E3/uL Final    Neutrophil Rel % 03/11/2024 69  Not Estab. % Final    Lymphocyte Rel % 03/11/2024 17  Not Estab. % Final    Monocyte Rel % 03/11/2024 10  Not Estab. % Final    Eosinophil Rel % 03/11/2024 3  Not Estab. % Final    Basophil Rel % 03/11/2024 1  Not Estab. % Final    Neutrophils Absolute 03/11/2024 4.4  1.4 - 7.0 x10E3/uL Final    Lymphocytes Absolute 03/11/2024 1.1  0.7 - 3.1 x10E3/uL Final    Monocytes Absolute 03/11/2024 0.6  0.1 - 0.9 x10E3/uL Final    Eosinophils Absolute 03/11/2024 0.2  0.0 - 0.4 x10E3/uL Final    Basophils Absolute 03/11/2024 0.1  0.0 - 0.2 x10E3/uL Final    Immature Granulocyte Rel % 03/11/2024 0  Not Estab. % Final    Immature Grans Absolute 03/11/2024 0.0  0.0 - 0.1 x10E3/uL Final    Total Cholesterol 03/11/2024 182  100 - 199 mg/dL Final    Triglycerides 03/11/2024 98  0 - 149 mg/dL Final    HDL Cholesterol 03/11/2024 44  >39 mg/dL Final    VLDL Cholesterol Marco 03/11/2024 18  5 - 40 mg/dL Final    LDL Chol Calc (NIH) 03/11/2024 120 (H)  0 - 99 mg/dL Final         Assessment & Plan   Problems Addressed this Visit    None  Visit Diagnoses       Generalized anxiety disorder  (Chronic)   -  Primary    Sleep difficulties  (Chronic)             Diagnoses         Codes Comments    Generalized anxiety disorder    -  Primary ICD-10-CM: F41.1  ICD-9-CM: 300.02     Sleep difficulties     ICD-10-CM:  G47.9  ICD-9-CM: 780.50             Visit Diagnoses:    ICD-10-CM ICD-9-CM   1. Generalized anxiety disorder  F41.1 300.02   2. Sleep difficulties  G47.9 780.50         Rule out MDD       GOALS:  Short Term Goals: Patient will be compliant with medication, and patient will have no significant medication related side effects.  Patient will be engaged in psychotherapy as indicated.  Patient will report subjective improvement of symptoms.  Long term goals: To stabilize mood and treat/improve subjective symptoms, the patient will stay out of the hospital, the patient will be at an optimal level of functioning, and the patient will take all medications as prescribed.  The patient verbalized understanding and agreement with goals that were mutually set.      TREATMENT PLAN:   Continue supportive psychotherapy efforts and medications as indicated.   -Continue Lexapro 5 mg PO Daily for anxiety  -Discontinue the Remeron at this time to see if this is the source of the night sweats. Pt is concerned she will not be able to sleep without it. This APRN recommended she try OTC Melatonin 10 mg PO QHS PRN for sleep. If this proves to be ineffective, to try OTC magnesium glycinate. If this is also ineffective, advised pt and her son to call this APRN next week and we may add a small dose of Seroquel 12.5-25 mg PO QHS for sleep.     Medication and treatment options, both pharmacological and non-pharmacological treatment options, discussed during today's visit, including any off label use of medication. Patient acknowledged and verbally consented with current treatment plan and was educated on the importance of compliance with treatment and follow-up appointments.       MEDICATION ISSUES:    Discussed treatment plan and medication options of prescribed medication as well as the risks, benefits, any black box warnings, and side effects including potential falls, possible impaired driving, and metabolic adversities among others,  including any off label use of medication. Patient is agreeable to call the office with any worsening of symptoms or onset of side effects, or if any concerns or questions arise.  The contact information for the office is made available to the patient. They may call the [facility] at [phone]. Patient is agreeable to call 911 or go to the nearest ER should they begin having any SI/HI, or if any urgent concerns arise.        MEDS ORDERED DURING VISIT:  No orders of the defined types were placed in this encounter.      Return in about 2 weeks (around 4/3/2024), or if symptoms worsen or fail to improve, for Recheck.     Treatment plan completed: 10/25/23    Progress toward goal: Not at goal    Functional Status: Mild impairment     Prognosis: Good with Ongoing Treatment       This patient will not be seen for the in person visits due to the following exception(s): It would be a hardship for this patient to see a provider in person.    It is my professional opinion that the patient is clinically stable and an in person visit will risk worsening the patient's condition creating undue hardship.           This document has been electronically signed by TOÑO Anthony  March 20, 2024 14:17 EDT    Some of the data in this electronic note has been brought forward from a previous encounter, any necessary changes have been made, it has been reviewed by this APRN, and it is accurate.    Please note that portions of this note were completed with a voice recognition program. Efforts were made to edit dictation, but occasionally words are mistranscribed.

## 2024-03-26 ENCOUNTER — TELEPHONE (OUTPATIENT)
Dept: PSYCHIATRY | Facility: CLINIC | Age: 89
End: 2024-03-26
Payer: MEDICARE

## 2024-03-26 RX ORDER — QUETIAPINE FUMARATE 25 MG/1
TABLET, FILM COATED ORAL
Qty: 30 TABLET | Refills: 0 | Status: SHIPPED | OUTPATIENT
Start: 2024-03-26

## 2024-03-26 NOTE — TELEPHONE ENCOUNTER
It takes approximately 8.5 days for the Remeron to be removed from the body. We should wait a little longer to see if it is the cause for the night sweats. Has the patient tried taking OTC magnesium glycinate for sleep like we discussed during the appointment? Thank you.

## 2024-03-26 NOTE — TELEPHONE ENCOUNTER
Pt called and she continues to have night sweats despite being off the Remeron for almost a week. The OTC melatonin and magnesium glycinate are ineffective for sleep. Will start Seroquel 12.5-25 mg PO QHS for sleep. Recommended trying the 12.5 mg first.

## 2024-03-26 NOTE — TELEPHONE ENCOUNTER
Pt called stating since she has stop taking the Remeron she is still having night sweats and not sleeping well.Pt tried taking the melatonin not helping.Please advise

## 2024-03-26 NOTE — TELEPHONE ENCOUNTER
I would still like to wait until next week to see if the night sweats resolve. I will send in Seroquel 12.5-25 mg PO QHS for sleep. Please instruct pt to start with the 12.5 mg dose. The higher the dose the more sedating it is.

## 2024-03-27 ENCOUNTER — OFFICE VISIT (OUTPATIENT)
Dept: FAMILY MEDICINE CLINIC | Facility: CLINIC | Age: 89
End: 2024-03-27
Payer: MEDICARE

## 2024-03-27 ENCOUNTER — TELEPHONE (OUTPATIENT)
Dept: FAMILY MEDICINE CLINIC | Facility: CLINIC | Age: 89
End: 2024-03-27

## 2024-03-27 VITALS
WEIGHT: 124 LBS | OXYGEN SATURATION: 95 % | HEART RATE: 76 BPM | DIASTOLIC BLOOD PRESSURE: 62 MMHG | HEIGHT: 63 IN | BODY MASS INDEX: 21.97 KG/M2 | SYSTOLIC BLOOD PRESSURE: 140 MMHG

## 2024-03-27 DIAGNOSIS — K58.9 IRRITABLE BOWEL SYNDROME, UNSPECIFIED TYPE: ICD-10-CM

## 2024-03-27 DIAGNOSIS — F51.01 PRIMARY INSOMNIA: ICD-10-CM

## 2024-03-27 DIAGNOSIS — E03.9 ACQUIRED HYPOTHYROIDISM: ICD-10-CM

## 2024-03-27 DIAGNOSIS — F41.9 ANXIETY: Primary | ICD-10-CM

## 2024-03-27 DIAGNOSIS — I10 PRIMARY HYPERTENSION: ICD-10-CM

## 2024-03-27 PROCEDURE — 99214 OFFICE O/P EST MOD 30 MIN: CPT | Performed by: FAMILY MEDICINE

## 2024-03-27 RX ORDER — DICYCLOMINE HYDROCHLORIDE 10 MG/1
CAPSULE ORAL
Qty: 90 CAPSULE | Refills: 2 | Status: SHIPPED | OUTPATIENT
Start: 2024-03-27 | End: 2024-03-27 | Stop reason: SDUPTHER

## 2024-03-27 RX ORDER — DICYCLOMINE HYDROCHLORIDE 10 MG/1
CAPSULE ORAL
Qty: 90 CAPSULE | Refills: 2 | Status: SHIPPED | OUTPATIENT
Start: 2024-03-27

## 2024-03-27 RX ORDER — METOPROLOL SUCCINATE 50 MG/1
50 TABLET, EXTENDED RELEASE ORAL DAILY
Qty: 90 TABLET | Refills: 1 | Status: SHIPPED | OUTPATIENT
Start: 2024-03-27

## 2024-03-27 NOTE — TELEPHONE ENCOUNTER
Caller: ANDREI - [facility] [place]    Relationship:     Best call back number: [phone]     Which medication are you concerned about: dicyclomine 10 MG    Who prescribed you this medication: CONNOR ZELAYA    What are your concerns: PHARMACY WANT TO KNOW THE CORRECT DIRECTION. IS 1 AS NEEDED CORRECT. NO QUANTITY WAS LISTED.

## 2024-03-28 NOTE — PROGRESS NOTES
Follow Up Office Visit      Date of Visit:  2024   Patient Name: Katie Vaughn  : 1935   MRN: [id number]     Chief Complaint:  No chief complaint on file.      History of Present Illness: Katie Vaughn is a 88 y.o. female who is here today for follow up.  Patient seen today in follow-up of her anxiety insomnia hypothyroidism hypertension and irritable bowel syndrome.  Medical conditions are currently relatively stable.  We reviewed her recent blood work which was also normal.  No additional major complaints today.  She does need refills.        Subjective      Review of Systems:   Review of Systems   Constitutional:  Negative for fatigue and fever.   HENT:  Negative for congestion and ear pain.    Respiratory:  Negative for apnea, cough, chest tightness and shortness of breath.    Cardiovascular:  Negative for chest pain.   Gastrointestinal:  Negative for abdominal pain, constipation, diarrhea and nausea.   Musculoskeletal:  Negative for arthralgias.   Psychiatric/Behavioral:  Negative for depressed mood and stress.        Past Medical History:   Past Medical History:   Diagnosis Date    Anxiety     Cataract     Disease of thyroid gland     Diverticulitis     Ovarian cancer 2003    Positive TB test        Past Surgical History:   Past Surgical History:   Procedure Laterality Date    APPENDECTOMY      CHOLECYSTECTOMY      HYSTERECTOMY         Family History:   Family History   Problem Relation Age of Onset    Lung cancer Mother     Colon cancer Paternal Aunt     Colon cancer Maternal Uncle     Depression Sister     Suicide Attempts Nephew     Drug abuse Nephew     OCD Grandson        Social History:   Social History     Socioeconomic History    Marital status:    Tobacco Use    Smoking status: Former     Current packs/day: 0.00     Average packs/day: 1 pack/day for 15.0 years (15.0 ttl pk-yrs)     Types: Cigarettes     Start date:      Quit date:      Years since quittin.2     "Smokeless tobacco: Never   Vaping Use    Vaping status: Never Used   Substance and Sexual Activity    Alcohol use: No    Drug use: No    Sexual activity: Defer       Medications:     Current Outpatient Medications:     dicyclomine (BENTYL) 10 MG capsule, PRN abd cramps, Disp: 90 capsule, Rfl: 2    metoprolol succinate XL (TOPROL-XL) 50 MG 24 hr tablet, Take 1 tablet by mouth Daily., Disp: 90 tablet, Rfl: 1    escitalopram (LEXAPRO) 5 MG tablet, Take 1 tablet by mouth once daily, Disp: 90 tablet, Rfl: 0    QUEtiapine (SEROquel) 25 MG tablet, Take 1/2-1 tablet PO QHS, Disp: 30 tablet, Rfl: 0    Synthroid 88 MCG tablet, Take 1 tablet by mouth Daily. Give name brand, Disp: 90 tablet, Rfl: 1    Allergies:   Allergies   Allergen Reactions    Latex Itching, Dermatitis and Rash    Betadine [Povidone Iodine] Itching     Pt says her skin also blistered    Sulfa Antibiotics Swelling    Penicillins Rash       Objective     Physical Exam:  Vital Signs:   Vitals:    03/27/24 1329   BP: 140/62   Pulse: 76   SpO2: 95%   Weight: 56.2 kg (124 lb)   Height: 160 cm (63\")     Body mass index is 21.97 kg/m².     Physical Exam  Vitals and nursing note reviewed.   Constitutional:       General: She is not in acute distress.     Appearance: Normal appearance. She is not ill-appearing.   HENT:      Head: Normocephalic and atraumatic.      Right Ear: Tympanic membrane and ear canal normal.      Left Ear: Tympanic membrane and ear canal normal.      Nose: Nose normal.   Cardiovascular:      Rate and Rhythm: Normal rate and regular rhythm.      Heart sounds: Normal heart sounds.   Pulmonary:      Effort: Pulmonary effort is normal.      Breath sounds: Normal breath sounds.   Neurological:      Mental Status: She is alert and oriented to person, place, and time. Mental status is at baseline.   Psychiatric:         Mood and Affect: Mood normal.         Procedures      Assessment / Plan      Assessment/Plan:   Diagnoses and all orders for this " visit:    1. Anxiety (Primary)    2. Irritable bowel syndrome, unspecified type  -     dicyclomine (BENTYL) 10 MG capsule; PRN abd cramps  Dispense: 90 capsule; Refill: 2    3. Primary hypertension  -     metoprolol succinate XL (TOPROL-XL) 50 MG 24 hr tablet; Take 1 tablet by mouth Daily.  Dispense: 90 tablet; Refill: 1    4. Primary insomnia    5. Acquired hypothyroidism         No changes in medication.  Medication refills were given today.  Appropriate maintenance discussed.    Follow Up:   No follow-ups on file.    Dmitry Nicole  [facility]

## 2024-04-03 ENCOUNTER — TELEMEDICINE (OUTPATIENT)
Dept: PSYCHIATRY | Facility: CLINIC | Age: 89
End: 2024-04-03
Payer: MEDICARE

## 2024-04-03 DIAGNOSIS — G47.9 SLEEP DIFFICULTIES: Chronic | ICD-10-CM

## 2024-04-03 DIAGNOSIS — F41.1 GENERALIZED ANXIETY DISORDER: Primary | Chronic | ICD-10-CM

## 2024-04-03 PROCEDURE — 1160F RVW MEDS BY RX/DR IN RCRD: CPT | Performed by: NURSE PRACTITIONER

## 2024-04-03 PROCEDURE — 1159F MED LIST DOCD IN RCRD: CPT | Performed by: NURSE PRACTITIONER

## 2024-04-03 PROCEDURE — 99214 OFFICE O/P EST MOD 30 MIN: CPT | Performed by: NURSE PRACTITIONER

## 2024-04-03 RX ORDER — BUSPIRONE HYDROCHLORIDE 5 MG/1
TABLET ORAL
Qty: 60 TABLET | Refills: 0 | Status: SHIPPED | OUTPATIENT
Start: 2024-04-03

## 2024-04-03 NOTE — PROGRESS NOTES
This provider is completing this appointment through [facility] (through [facility]), [address], [facility], [address] using a secure [employer] Video Visit through [employer]. Patient is being seen remotely via telehealth at their residence in [place], and stated they are in a secure environment for this session. The patient's condition being diagnosed/treated is appropriate for telemedicine. The provider identified herself as well as her credentials.   The patient, and/or patients guardian, consent to be seen remotely, and when consent is given they understand that the consent allows for patient identifiable information to be sent to a third party as needed.   They may refuse to be seen remotely at any time. The electronic data is encrypted and password protected, and the patient and/or guardian has been advised of the potential risks to privacy not withstanding such measures.    You have chosen to receive care through a telehealth visit.  Do you consent to use a video/audio connection for your medical care today? Yes    Patient identifiers utilized: Name and date of birth.        Subjective   Katie Vaughn is a 88 y.o. female who presents today for follow up    Chief Complaint:  Anxiety and sleep difficulties    Accompanied by: Pt's daughter-in-law, Tiffanie, is present during appointment with pt's verbal permission.    History of Present Illness:   *Pt was last seen by this APRN on 3/20/24. Pt called on 3/26/24 stating she continues to have night sweats. She also reported that the OTC melatonin and magnesium glycinate were ineffective for sleep. At that time Seroquel 12.5-25 mg PO QHS was ordered for sleep to avoid sedating tricyclic antidepressants, which are also known to commonly cause sweating.*    Per pt, in the past two weeks, she experienced night sweats 11 of those nights. She reports seven of them were mild. However, four of those nights she had to get up and change her  clothes twice. The sweating occurs on the front side of her body. Pt states she did not experience sweating last night, but the night before was a night she had to change her clothes twice. She has been taking 12.5 mg of the Seroquel and has been getting 4-5 hours of sleep. She has not yet tried the 25 mg of Seroquel; she believes she may do better on the higher dose. This APRN recommended pt try the 25 mg dose in hopes she will get more sleep. Pt has experienced mild anxiousness on occasion over nothing in particular. Appetite is stable. She denies feeling more hungry. 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 Pt helped cook for Easter. The Lexapro seems likely to be the cause of the night sweats. The plan is to taper pt off the Lexapro and replace with Buspar. The patient denies any new medical problems since last appointment with this facility. The patient reports compliance with current medication regimen. The patient denies any abnormal muscle movements or tics. The patient would like to change their medications at this visit. The patient denies any suicidal or homicidal ideations, plans, or intent at today's encounter and is convincing. The patient denies any auditory hallucinations or visual hallucinations. The patient does not endorse any significant symptoms consistent with will or psychosis at today's encounter.     *If the patient has any concerns or needs assistance, they may call the [facility] at [phone]*        Prior Psychiatric Medications:  Klonopin  Zoloft - GI disturbance, took for a few days  Trazodone - ineffective at 25 mg  Ativan - did well on it in the past   Doxepin - 10-20 mg ineffective for sleep  Elavil  Melatonin - ineffective  Magnesium Glycinate - ineffective      The following portions of the patient's history were reviewed and updated as appropriate: allergies, current medications, past family history, past medical history,  past social history, past surgical history and problem list.          Past Medical History:  Past Medical History:   Diagnosis Date    Anxiety     Cataract     Disease of thyroid gland     Diverticulitis     Ovarian cancer 2003    Positive TB test        Social History:  Social History     Socioeconomic History    Marital status:    Tobacco Use    Smoking status: Former     Current packs/day: 0.00     Average packs/day: 1 pack/day for 15.0 years (15.0 ttl pk-yrs)     Types: Cigarettes     Start date:      Quit date:      Years since quittin.2    Smokeless tobacco: Never   Vaping Use    Vaping status: Never Used   Substance and Sexual Activity    Alcohol use: No    Drug use: No    Sexual activity: Defer       Family History:  Family History   Problem Relation Age of Onset    Lung cancer Mother     Colon cancer Paternal Aunt     Colon cancer Maternal Uncle     Depression Sister     Suicide Attempts Nephew     Drug abuse Nephew     OCD Grandson        Past Surgical History:  Past Surgical History:   Procedure Laterality Date    APPENDECTOMY      CHOLECYSTECTOMY      HYSTERECTOMY         Problem List:  Patient Active Problem List   Diagnosis    Diffuse large B-cell lymphoma of lymph nodes of multiple regions    Encounter for antineoplastic chemotherapy       Allergy:   Allergies   Allergen Reactions    Latex Itching, Dermatitis and Rash    Betadine [Povidone Iodine] Itching     Pt says her skin also blistered    Sulfa Antibiotics Swelling    Penicillins Rash        Current Medications:   Current Outpatient Medications   Medication Sig Dispense Refill    busPIRone (BUSPAR) 5 MG tablet Take 1/2 tablet PO QAM and every afternoon. If tolerating well, may increase to 1 tablet PO QAM and every afternoon 60 tablet 0    dicyclomine (BENTYL) 10 MG capsule 1 po 30 min before meals prn abd cramps 90 capsule 2    escitalopram (LEXAPRO) 5 MG tablet Take 1 tablet by mouth once daily 90 tablet 0    metoprolol  succinate XL (TOPROL-XL) 50 MG 24 hr tablet Take 1 tablet by mouth Daily. 90 tablet 1    QUEtiapine (SEROquel) 25 MG tablet Take 1/2-1 tablet PO QHS 30 tablet 0    Synthroid 88 MCG tablet Take 1 tablet by mouth Daily. Give name brand 90 tablet 1     No current facility-administered medications for this visit.       Review of Symptoms:    Review of Systems   Constitutional:  Positive for fatigue.   Psychiatric/Behavioral:  Positive for sleep disturbance.          Physical Exam:   Due to the remote nature of this encounter (virtual encounter), vitals were unable to be obtained.  Height stated at 63 inches.  Weight stated at 124 pounds.      Physical Exam  Neurological:      Mental Status: She is alert.   Psychiatric:         Attention and Perception: Attention and perception normal. She does not perceive auditory or visual hallucinations.         Mood and Affect: Mood and affect normal.         Speech: Speech normal.         Behavior: Behavior normal. Behavior is cooperative.         Thought Content: Thought content normal. Thought content is not paranoid or delusional. Thought content does not include homicidal or suicidal ideation. Thought content does not include homicidal or suicidal plan.         Cognition and Memory: Cognition and memory normal.         Judgment: Judgment normal.           Mental Status Exam:   Hygiene:   good  Cooperation:  Cooperative  Eye Contact:  Good  Psychomotor Behavior:  Appropriate  Affect:  Full range  Mood: normal  Speech:  Normal  Thought Process:  Goal directed  Thought Content:  Normal  Suicidal:  None  Homicidal:  None  Hallucinations:  None  Delusion:  None  Memory:  Intact  Orientation:  Person, Place, Time, and Situation  Reliability:  good  Insight:  Good  Judgement:  Good  Impulse Control:  Good        PHQ-9 Depression Screening  Little interest or pleasure in doing things? (P) 1-->several days   Feeling down, depressed, or hopeless? (P) 0-->not at all   Trouble falling or  staying asleep, or sleeping too much? (P) 2-->more than half the days   Feeling tired or having little energy? (P) 1-->several days   Poor appetite or overeating? (P) 0-->not at all   Feeling bad about yourself - or that you are a failure or have let yourself or your family down? (P) 0-->not at all   Trouble concentrating on things, such as reading the newspaper or watching television? (P) 0-->not at all   Moving or speaking so slowly that other people could have noticed? Or the opposite - being so fidgety or restless that you have been moving around a lot more than usual? (P) 0-->not at all   Thoughts that you would be better off dead, or of hurting yourself in some way? (P) 0-->not at all   PHQ-9 Total Score (P) 4   If you checked off any problems, how difficult have these problems made it for you to do your work, take care of things at home, or get along with other people? (P) not difficult at all     PHQ-9 Total Score: (P) 4      YASMINE-7  Feeling nervous, anxious or on edge: (P) Several days  Not being able to stop or control worrying: (P) Several days  Worrying too much about different things: (P) Several days  Trouble Relaxing: (P) Several days  Being so restless that it is hard to sit still: (P) Not at all  Feeling afraid as if something awful might happen: (P) Not at all  Becoming easily annoyed or irritable: (P) Not at all  YASMINE 7 Total Score: (P) 4  If you checked any problems, how difficult have these problems made it for you to do your work, take care of things at home, or get along with other people: (P) Not difficult at all    Previous Provider notes and available records reviewed by this APRN at today's encounter.         Lab Results:   Office Visit on 03/11/2024   Component Date Value Ref Range Status    Glucose 03/11/2024 90  70 - 99 mg/dL Final    BUN 03/11/2024 12  8 - 27 mg/dL Final    Creatinine 03/11/2024 1.04 (H)  0.57 - 1.00 mg/dL Final    EGFR Result 03/11/2024 52 (L)  >59 mL/min/1.73 Final     BUN/Creatinine Ratio 03/11/2024 12  12 - 28 Final    Sodium 03/11/2024 140  134 - 144 mmol/L Final    Potassium 03/11/2024 4.2  3.5 - 5.2 mmol/L Final    Chloride 03/11/2024 101  96 - 106 mmol/L Final    Total CO2 03/11/2024 25  20 - 29 mmol/L Final    Calcium 03/11/2024 9.3  8.7 - 10.3 mg/dL Final    Total Protein 03/11/2024 6.4  6.0 - 8.5 g/dL Final    Albumin 03/11/2024 4.3  3.7 - 4.7 g/dL Final    Globulin 03/11/2024 2.1  1.5 - 4.5 g/dL Final    A/G Ratio 03/11/2024 2.0  1.2 - 2.2 Final    Total Bilirubin 03/11/2024 0.4  0.0 - 1.2 mg/dL Final    Alkaline Phosphatase 03/11/2024 66  44 - 121 IU/L Final    AST (SGOT) 03/11/2024 19  0 - 40 IU/L Final    ALT (SGPT) 03/11/2024 11  0 - 32 IU/L Final    TSH 03/11/2024 3.500  0.450 - 4.500 uIU/mL Final    Urine Culture 03/11/2024 Final report   Final    Result 1 03/11/2024 Comment   Final    Comment: Mixed urogenital dale  Less than 10,000 colonies/mL      Color 03/11/2024 Yellow  Yellow, Straw, Dark Yellow, Delfina Final    Clarity, UA 03/11/2024 Clear  Clear Final    Glucose, UA 03/11/2024 Negative  Negative mg/dL Final    Bilirubin 03/11/2024 Small (1+) (A)  Negative Final    Ketones, UA 03/11/2024 Negative  Negative Final    Specific Gravity  03/11/2024 1.025  1.005 - 1.030 Final    Blood, UA 03/11/2024 1+ (A)  Negative Final    pH, Urine 03/11/2024 5.5  5.0 - 8.0 Final    Protein, POC 03/11/2024 Trace (A)  Negative mg/dL Final    Urobilinogen, UA 03/11/2024 0.2 E.U./dL  Normal, 0.2 E.U./dL Final    Leukocytes 03/11/2024 Negative  Negative Final    Nitrite, UA 03/11/2024 Negative  Negative Final    WBC 03/11/2024 6.3  3.4 - 10.8 x10E3/uL Final    RBC 03/11/2024 4.64  3.77 - 5.28 x10E6/uL Final    Hemoglobin 03/11/2024 13.9  11.1 - 15.9 g/dL Final    Hematocrit 03/11/2024 42.6  34.0 - 46.6 % Final    MCV 03/11/2024 92  79 - 97 fL Final    MCH 03/11/2024 30.0  26.6 - 33.0 pg Final    MCHC 03/11/2024 32.6  31.5 - 35.7 g/dL Final    RDW 03/11/2024 12.4  11.7 - 15.4 %  Final    Platelets 03/11/2024 224  150 - 450 x10E3/uL Final    Neutrophil Rel % 03/11/2024 69  Not Estab. % Final    Lymphocyte Rel % 03/11/2024 17  Not Estab. % Final    Monocyte Rel % 03/11/2024 10  Not Estab. % Final    Eosinophil Rel % 03/11/2024 3  Not Estab. % Final    Basophil Rel % 03/11/2024 1  Not Estab. % Final    Neutrophils Absolute 03/11/2024 4.4  1.4 - 7.0 x10E3/uL Final    Lymphocytes Absolute 03/11/2024 1.1  0.7 - 3.1 x10E3/uL Final    Monocytes Absolute 03/11/2024 0.6  0.1 - 0.9 x10E3/uL Final    Eosinophils Absolute 03/11/2024 0.2  0.0 - 0.4 x10E3/uL Final    Basophils Absolute 03/11/2024 0.1  0.0 - 0.2 x10E3/uL Final    Immature Granulocyte Rel % 03/11/2024 0  Not Estab. % Final    Immature Grans Absolute 03/11/2024 0.0  0.0 - 0.1 x10E3/uL Final    Total Cholesterol 03/11/2024 182  100 - 199 mg/dL Final    Triglycerides 03/11/2024 98  0 - 149 mg/dL Final    HDL Cholesterol 03/11/2024 44  >39 mg/dL Final    VLDL Cholesterol Marco 03/11/2024 18  5 - 40 mg/dL Final    LDL Chol Calc (NIH) 03/11/2024 120 (H)  0 - 99 mg/dL Final         Assessment & Plan   Problems Addressed this Visit    None  Visit Diagnoses       Generalized anxiety disorder  (Chronic)   -  Primary    Relevant Medications    busPIRone (BUSPAR) 5 MG tablet    Sleep difficulties  (Chronic)             Diagnoses         Codes Comments    Generalized anxiety disorder    -  Primary ICD-10-CM: F41.1  ICD-9-CM: 300.02     Sleep difficulties     ICD-10-CM: G47.9  ICD-9-CM: 780.50             Visit Diagnoses:    ICD-10-CM ICD-9-CM   1. Generalized anxiety disorder  F41.1 300.02   2. Sleep difficulties  G47.9 780.50         Rule out MDD       GOALS:  Short Term Goals: Patient will be compliant with medication, and patient will have no significant medication related side effects.  Patient will be engaged in psychotherapy as indicated.  Patient will report subjective improvement of symptoms.  Long term goals: To stabilize mood and treat/improve  subjective symptoms, the patient will stay out of the hospital, the patient will be at an optimal level of functioning, and the patient will take all medications as prescribed.  The patient verbalized understanding and agreement with goals that were mutually set.      TREATMENT PLAN:   Continue supportive psychotherapy efforts and medications as indicated.   -Decrease Lexapro to 2.5 mg PO Daily x5 days, then discontinue  -Start Buspar 2.5 mg PO BID. If well tolerated, may increase to Buspar 5 mg PO BID for anxiety  -Continue Seroquel 12.5-25 mg PO QHS for sleep    Medication and treatment options, both pharmacological and non-pharmacological treatment options, discussed during today's visit, including any off label use of medication. Patient acknowledged and verbally consented with current treatment plan and was educated on the importance of compliance with treatment and follow-up appointments.       MEDICATION ISSUES:    Discussed treatment plan and medication options of prescribed medication as well as the risks, benefits, any black box warnings, and side effects including potential falls, possible impaired driving, and metabolic adversities among others, including any off label use of medication. Patient is agreeable to call the office with any worsening of symptoms or onset of side effects, or if any concerns or questions arise.  The contact information for the office is made available to the patient. They may call the [facility] at [phone]. Patient is agreeable to call [phone] or go to the nearest ER should they begin having any SI/HI, or if any urgent concerns arise.        MEDS ORDERED DURING VISIT:  New Medications Ordered This Visit   Medications    busPIRone (BUSPAR) 5 MG tablet     Sig: Take 1/2 tablet PO QAM and every afternoon. If tolerating well, may increase to 1 tablet PO QAM and every afternoon     Dispense:  60 tablet     Refill:  0       Return in about 4 weeks  (around 5/1/2024), or if symptoms worsen or fail to improve, for Recheck.     Treatment plan completed: 10/25/23    Progress toward goal: Not at goal    Functional Status: Mild impairment     Prognosis: Good with Ongoing Treatment       This patient will not be seen for the in person visits due to the following exception(s): It would be a hardship for this patient to see a provider in person.    It is my professional opinion that the patient is clinically stable and an in person visit will risk worsening the patient's condition creating undue hardship.           This document has been electronically signed by TOÑO Anthony  April 3, 2024 13:50 EDT    Some of the data in this electronic note has been brought forward from a previous encounter, any necessary changes have been made, it has been reviewed by this APRN, and it is accurate.    Please note that portions of this note were completed with a voice recognition program. Efforts were made to edit dictation, but occasionally words are mistranscribed.

## 2024-04-24 RX ORDER — QUETIAPINE FUMARATE 25 MG/1
TABLET, FILM COATED ORAL
Qty: 30 TABLET | Refills: 0 | Status: SHIPPED | OUTPATIENT
Start: 2024-04-24

## 2024-05-01 ENCOUNTER — TELEMEDICINE (OUTPATIENT)
Dept: PSYCHIATRY | Facility: CLINIC | Age: 89
End: 2024-05-01
Payer: MEDICARE

## 2024-05-01 DIAGNOSIS — G47.9 SLEEP DIFFICULTIES: Chronic | ICD-10-CM

## 2024-05-01 DIAGNOSIS — F41.1 GENERALIZED ANXIETY DISORDER: Primary | Chronic | ICD-10-CM

## 2024-05-01 PROCEDURE — 1160F RVW MEDS BY RX/DR IN RCRD: CPT | Performed by: NURSE PRACTITIONER

## 2024-05-01 PROCEDURE — 99214 OFFICE O/P EST MOD 30 MIN: CPT | Performed by: NURSE PRACTITIONER

## 2024-05-01 PROCEDURE — 1159F MED LIST DOCD IN RCRD: CPT | Performed by: NURSE PRACTITIONER

## 2024-05-01 RX ORDER — QUETIAPINE FUMARATE 25 MG/1
TABLET, FILM COATED ORAL
Qty: 90 TABLET | Refills: 0 | Status: SHIPPED | OUTPATIENT
Start: 2024-05-01

## 2024-05-01 RX ORDER — BUSPIRONE HYDROCHLORIDE 5 MG/1
TABLET ORAL
Qty: 90 TABLET | Refills: 0 | Status: SHIPPED | OUTPATIENT
Start: 2024-05-01

## 2024-05-01 NOTE — PROGRESS NOTES
This provider is completing this appointment through [facility] (through [facility]), [address], [facility], [id number] using a secure [employer] Video Visit through [employer]. Patient is being seen remotely via telehealth at their residence in [place], and stated they are in a secure environment for this session. The patient's condition being diagnosed/treated is appropriate for telemedicine. The provider identified herself as well as her credentials.   The patient, and/or patients guardian, consent to be seen remotely, and when consent is given they understand that the consent allows for patient identifiable information to be sent to a third party as needed.   They may refuse to be seen remotely at any time. The electronic data is encrypted and password protected, and the patient and/or guardian has been advised of the potential risks to privacy not withstanding such measures.    You have chosen to receive care through a telehealth visit.  Do you consent to use a video/audio connection for your medical care today? Yes    Patient identifiers utilized: Name and date of birth.        Subjective   Katie Vaughn is a 88 y.o. female who presents today for follow up    Chief Complaint:  Anxiety and sleep difficulties    Accompanied by: Pt's oldest son, Gabriel, and his wife, Tiffanie, are present during appointment with pt's verbal permission.     History of Present Illness:   Pt reports she is doing well overall. Pt is no longer having night sweats, therefore, Lexapro was the cause. Pt has been taking Buspar 2.5 mg PO BID. When she attempted to increase the dose to 5 mg PO BID, she did not like how she felt on it. She describes feeling lightheaded. Per pt, anxiety is not constant and more situational at this time. It can take her time to calm down if something upsets her. Pt still does not feel comfortable driving. Pt feels pressured to at times by her . He wants to get out of the  house more. However, he does have limited mobility and some cognitive impairment, which makes it more difficult. Per pt, he has forgotten that his mother is  among other things. Pt admits she has been in denial about his cognitive decline. Pt spends her day doing chores around the house, cooking, and watching television. Pt has been going outside more. Her  wants her to stay next to him rather than letting her get up and do activities while outside. Pt's  visits once or twice a month; she enjoys his visits. Pt is eating out of necessity rather than because she feels hungry. Pt is averaging 6 hours of sleep on the Seroquel 25 mg. Pt wakes feeling rested. Pt sees her children fairly often. The patient denies any new medical problems since last appointment with this facility. The patient reports compliance with current medication regimen. The patient denies any abnormal muscle movements or tics. The patient would like to not adjust or change their medications at this visit. The patient denies any suicidal or homicidal ideations, plans, or intent at today's encounter and is convincing. The patient denies any auditory hallucinations or visual hallucinations. The patient does not endorse any significant symptoms consistent with will or psychosis at today's encounter.     *If the patient has any concerns or needs assistance, they may call the [facility] at [phone]*        Prior Psychiatric Medications:  Klonopin  Zoloft - GI disturbance, took for a few days  Trazodone - ineffective at 25 mg  Ativan - did well on it in the past   Doxepin - 10-20 mg ineffective for sleep  Elavil  Melatonin - ineffective  Magnesium Glycinate - ineffective  Lexapro - night sweats  Remeron - effective      The following portions of the patient's history were reviewed and updated as appropriate: allergies, current medications, past family history, past medical history, past social history,  past surgical history and problem list.          Past Medical History:  Past Medical History:   Diagnosis Date    Anxiety     Cataract     Disease of thyroid gland     Diverticulitis     Ovarian cancer 2003    Positive TB test        Social History:  Social History     Socioeconomic History    Marital status:    Tobacco Use    Smoking status: Former     Current packs/day: 0.00     Average packs/day: 1 pack/day for 15.0 years (15.0 ttl pk-yrs)     Types: Cigarettes     Start date:      Quit date:      Years since quittin.3    Smokeless tobacco: Never   Vaping Use    Vaping status: Never Used   Substance and Sexual Activity    Alcohol use: No    Drug use: No    Sexual activity: Defer       Family History:  Family History   Problem Relation Age of Onset    Lung cancer Mother     Colon cancer Paternal Aunt     Colon cancer Maternal Uncle     Depression Sister     Suicide Attempts Nephew     Drug abuse Nephew     OCD Grandson        Past Surgical History:  Past Surgical History:   Procedure Laterality Date    APPENDECTOMY      CHOLECYSTECTOMY      HYSTERECTOMY         Problem List:  Patient Active Problem List   Diagnosis    Diffuse large B-cell lymphoma of lymph nodes of multiple regions    Encounter for antineoplastic chemotherapy       Allergy:   Allergies   Allergen Reactions    Latex Itching, Dermatitis and Rash    Betadine [Povidone Iodine] Itching     Pt says her skin also blistered    Sulfa Antibiotics Swelling    Penicillins Rash        Current Medications:   Current Outpatient Medications   Medication Sig Dispense Refill    busPIRone (BUSPAR) 5 MG tablet Take 1/2 tablet PO QAM and every afternoon 90 tablet 0    QUEtiapine (SEROquel) 25 MG tablet TAKE 1 TABLET PO QHS 90 tablet 0    dicyclomine (BENTYL) 10 MG capsule 1 po 30 min before meals prn abd cramps 90 capsule 2    metoprolol succinate XL (TOPROL-XL) 50 MG 24 hr tablet Take 1 tablet by mouth Daily. 90 tablet 1    Synthroid 88 MCG tablet  Take 1 tablet by mouth Daily. Give name brand 90 tablet 1     No current facility-administered medications for this visit.       Review of Symptoms:    Review of Systems   Constitutional: Negative.    Psychiatric/Behavioral:  Positive for stress.          Physical Exam:   Due to the remote nature of this encounter (virtual encounter), vitals were unable to be obtained.  Height stated at 63 inches.  Weight stated at 124 pounds.      Physical Exam  Neurological:      Mental Status: She is alert.   Psychiatric:         Attention and Perception: Attention and perception normal.         Mood and Affect: Mood and affect normal.         Speech: Speech normal.         Behavior: Behavior normal. Behavior is cooperative.         Thought Content: Thought content normal. Thought content is not paranoid or delusional. Thought content does not include homicidal or suicidal ideation. Thought content does not include homicidal or suicidal plan.         Cognition and Memory: Cognition and memory normal.         Judgment: Judgment normal.           Mental Status Exam:   Hygiene:   good  Cooperation:  Cooperative  Eye Contact:  Good  Psychomotor Behavior:  Appropriate  Affect:  Full range  Mood: normal  Speech:  Normal  Thought Process:  Linear  Thought Content:  Normal  Suicidal:  None  Homicidal:  None  Hallucinations:  None  Delusion:  None  Memory:  Intact  Orientation:  Person, Place, Time, and Situation  Reliability:  good  Insight:  Good  Judgement:  Good  Impulse Control:  Good        PHQ-9 Depression Screening  Little interest or pleasure in doing things? (P) 0-->not at all   Feeling down, depressed, or hopeless? (P) 0-->not at all   Trouble falling or staying asleep, or sleeping too much? (P) 1-->several days   Feeling tired or having little energy? (P) 1-->several days   Poor appetite or overeating? (P) 0-->not at all   Feeling bad about yourself - or that you are a failure or have let yourself or your family down? (P)  0-->not at all   Trouble concentrating on things, such as reading the newspaper or watching television? (P) 0-->not at all   Moving or speaking so slowly that other people could have noticed? Or the opposite - being so fidgety or restless that you have been moving around a lot more than usual? (P) 0-->not at all   Thoughts that you would be better off dead, or of hurting yourself in some way? (P) 0-->not at all   PHQ-9 Total Score (P) 2   If you checked off any problems, how difficult have these problems made it for you to do your work, take care of things at home, or get along with other people? (P) not difficult at all     PHQ-9 Total Score: (P) 2      YASMINE-7  Feeling nervous, anxious or on edge: (P) Several days  Not being able to stop or control worrying: (P) Several days  Worrying too much about different things: (P) Several days  Trouble Relaxing: (P) Several days  Being so restless that it is hard to sit still: (P) Not at all  Feeling afraid as if something awful might happen: (P) Not at all  Becoming easily annoyed or irritable: (P) Not at all  YASMINE 7 Total Score: (P) 4  If you checked any problems, how difficult have these problems made it for you to do your work, take care of things at home, or get along with other people: (P) Not difficult at all    Previous Provider notes and available records reviewed by this APRN at today's encounter.         Lab Results:   No visits with results within 1 Month(s) from this visit.   Latest known visit with results is:   Office Visit on 03/11/2024   Component Date Value Ref Range Status    Glucose 03/11/2024 90  70 - 99 mg/dL Final    BUN 03/11/2024 12  8 - 27 mg/dL Final    Creatinine 03/11/2024 1.04 (H)  0.57 - 1.00 mg/dL Final    EGFR Result 03/11/2024 52 (L)  >59 mL/min/1.73 Final    BUN/Creatinine Ratio 03/11/2024 12  12 - 28 Final    Sodium 03/11/2024 140  134 - 144 mmol/L Final    Potassium 03/11/2024 4.2  3.5 - 5.2 mmol/L Final    Chloride 03/11/2024 101  96 - 106  mmol/L Final    Total CO2 03/11/2024 25  20 - 29 mmol/L Final    Calcium 03/11/2024 9.3  8.7 - 10.3 mg/dL Final    Total Protein 03/11/2024 6.4  6.0 - 8.5 g/dL Final    Albumin 03/11/2024 4.3  3.7 - 4.7 g/dL Final    Globulin 03/11/2024 2.1  1.5 - 4.5 g/dL Final    A/G Ratio 03/11/2024 2.0  1.2 - 2.2 Final    Total Bilirubin 03/11/2024 0.4  0.0 - 1.2 mg/dL Final    Alkaline Phosphatase 03/11/2024 66  44 - 121 IU/L Final    AST (SGOT) 03/11/2024 19  0 - 40 IU/L Final    ALT (SGPT) 03/11/2024 11  0 - 32 IU/L Final    TSH 03/11/2024 3.500  0.450 - 4.500 uIU/mL Final    Urine Culture 03/11/2024 Final report   Final    Result 1 03/11/2024 Comment   Final    Comment: Mixed urogenital dale  Less than 10,000 colonies/mL      Color 03/11/2024 Yellow  Yellow, Straw, Dark Yellow, Delfina Final    Clarity, UA 03/11/2024 Clear  Clear Final    Glucose, UA 03/11/2024 Negative  Negative mg/dL Final    Bilirubin 03/11/2024 Small (1+) (A)  Negative Final    Ketones, UA 03/11/2024 Negative  Negative Final    Specific Gravity  03/11/2024 1.025  1.005 - 1.030 Final    Blood, UA 03/11/2024 1+ (A)  Negative Final    pH, Urine 03/11/2024 5.5  5.0 - 8.0 Final    Protein, POC 03/11/2024 Trace (A)  Negative mg/dL Final    Urobilinogen, UA 03/11/2024 0.2 E.U./dL  Normal, 0.2 E.U./dL Final    Leukocytes 03/11/2024 Negative  Negative Final    Nitrite, UA 03/11/2024 Negative  Negative Final    WBC 03/11/2024 6.3  3.4 - 10.8 x10E3/uL Final    RBC 03/11/2024 4.64  3.77 - 5.28 x10E6/uL Final    Hemoglobin 03/11/2024 13.9  11.1 - 15.9 g/dL Final    Hematocrit 03/11/2024 42.6  34.0 - 46.6 % Final    MCV 03/11/2024 92  79 - 97 fL Final    MCH 03/11/2024 30.0  26.6 - 33.0 pg Final    MCHC 03/11/2024 32.6  31.5 - 35.7 g/dL Final    RDW 03/11/2024 12.4  11.7 - 15.4 % Final    Platelets 03/11/2024 224  150 - 450 x10E3/uL Final    Neutrophil Rel % 03/11/2024 69  Not Estab. % Final    Lymphocyte Rel % 03/11/2024 17  Not Estab. % Final    Monocyte Rel %  03/11/2024 10  Not Estab. % Final    Eosinophil Rel % 03/11/2024 3  Not Estab. % Final    Basophil Rel % 03/11/2024 1  Not Estab. % Final    Neutrophils Absolute 03/11/2024 4.4  1.4 - 7.0 x10E3/uL Final    Lymphocytes Absolute 03/11/2024 1.1  0.7 - 3.1 x10E3/uL Final    Monocytes Absolute 03/11/2024 0.6  0.1 - 0.9 x10E3/uL Final    Eosinophils Absolute 03/11/2024 0.2  0.0 - 0.4 x10E3/uL Final    Basophils Absolute 03/11/2024 0.1  0.0 - 0.2 x10E3/uL Final    Immature Granulocyte Rel % 03/11/2024 0  Not Estab. % Final    Immature Grans Absolute 03/11/2024 0.0  0.0 - 0.1 x10E3/uL Final    Total Cholesterol 03/11/2024 182  100 - 199 mg/dL Final    Triglycerides 03/11/2024 98  0 - 149 mg/dL Final    HDL Cholesterol 03/11/2024 44  >39 mg/dL Final    VLDL Cholesterol Marco 03/11/2024 18  5 - 40 mg/dL Final    LDL Chol Calc (NIH) 03/11/2024 120 (H)  0 - 99 mg/dL Final         Assessment & Plan   Problems Addressed this Visit    None  Visit Diagnoses       Generalized anxiety disorder  (Chronic)   -  Primary    Relevant Medications    busPIRone (BUSPAR) 5 MG tablet    QUEtiapine (SEROquel) 25 MG tablet    Sleep difficulties  (Chronic)       Relevant Medications    QUEtiapine (SEROquel) 25 MG tablet          Diagnoses         Codes Comments    Generalized anxiety disorder    -  Primary ICD-10-CM: F41.1  ICD-9-CM: 300.02     Sleep difficulties     ICD-10-CM: G47.9  ICD-9-CM: 780.50             Visit Diagnoses:    ICD-10-CM ICD-9-CM   1. Generalized anxiety disorder  F41.1 300.02   2. Sleep difficulties  G47.9 780.50         Rule out MDD       GOALS:  Short Term Goals: Patient will be compliant with medication, and patient will have no significant medication related side effects.  Patient will be engaged in psychotherapy as indicated.  Patient will report subjective improvement of symptoms.  Long term goals: To stabilize mood and treat/improve subjective symptoms, the patient will stay out of the hospital, the patient will be at  an optimal level of functioning, and the patient will take all medications as prescribed.  The patient verbalized understanding and agreement with goals that were mutually set.      TREATMENT PLAN:   Continue supportive psychotherapy efforts and medications as indicated.   -Continue Buspar 2.5 mg PO BID for anxiety  -Continue Seroquel 25 mg PO QHS for sleep    Medication and treatment options, both pharmacological and non-pharmacological treatment options, discussed during today's visit, including any off label use of medication. Patient acknowledged and verbally consented with current treatment plan and was educated on the importance of compliance with treatment and follow-up appointments.       MEDICATION ISSUES:    Discussed treatment plan and medication options of prescribed medication as well as the risks, benefits, any black box warnings, and side effects including potential falls, possible impaired driving, and metabolic adversities among others, including any off label use of medication. Patient is agreeable to call the office with any worsening of symptoms or onset of side effects, or if any concerns or questions arise.  The contact information for the office is made available to the patient. They may call the [facility] at [phone]. Patient is agreeable to call 911 or go to the nearest ER should they begin having any SI/HI, or if any urgent concerns arise.        MEDS ORDERED DURING VISIT:  New Medications Ordered This Visit   Medications    busPIRone (BUSPAR) 5 MG tablet     Sig: Take 1/2 tablet PO QAM and every afternoon     Dispense:  90 tablet     Refill:  0    QUEtiapine (SEROquel) 25 MG tablet     Sig: TAKE 1 TABLET PO QHS     Dispense:  90 tablet     Refill:  0       Return in about 8 weeks (around 6/26/2024), or if symptoms worsen or fail to improve, for Recheck.     Treatment plan completed: 10/25/23    Progress toward goal: Not at goal    Functional Status: Mild  impairment     Prognosis: Good with Ongoing Treatment       This patient will not be seen for the in person visits due to the following exception(s): It would be a hardship for this patient to see a provider in person.    It is my professional opinion that the patient is clinically stable and an in person visit will risk worsening the patient's condition creating undue hardship.           This document has been electronically signed by TOÑO Anthony  May 1, 2024 14:03 EDT    Some of the data in this electronic note has been brought forward from a previous encounter, any necessary changes have been made, it has been reviewed by this APRN, and it is accurate.    Please note that portions of this note were completed with a voice recognition program. Efforts were made to edit dictation, but occasionally words are mistranscribed.

## 2024-05-21 DIAGNOSIS — G47.9 SLEEP DIFFICULTIES: Chronic | ICD-10-CM

## 2024-05-21 RX ORDER — QUETIAPINE FUMARATE 25 MG/1
TABLET, FILM COATED ORAL
Qty: 30 TABLET | Refills: 0 | OUTPATIENT
Start: 2024-05-21

## 2024-05-21 NOTE — TELEPHONE ENCOUNTER
Left patient  a detailed message from [place] covering provider. We receive a med refill request for patient wanted to see if patient is out of medication.

## 2024-05-21 NOTE — TELEPHONE ENCOUNTER
Quantity of 90 sent to pharmacy by Katy on May 1. Patient should have enough medication if we could confirm please? Refill requested too soon. Thank you.

## 2024-06-27 ENCOUNTER — TELEMEDICINE (OUTPATIENT)
Dept: PSYCHIATRY | Facility: CLINIC | Age: 89
End: 2024-06-27
Payer: MEDICARE

## 2024-06-27 DIAGNOSIS — F41.1 GENERALIZED ANXIETY DISORDER: Primary | Chronic | ICD-10-CM

## 2024-06-27 DIAGNOSIS — G47.9 SLEEP DIFFICULTIES: Chronic | ICD-10-CM

## 2024-06-27 PROCEDURE — 99214 OFFICE O/P EST MOD 30 MIN: CPT | Performed by: NURSE PRACTITIONER

## 2024-06-27 RX ORDER — BUSPIRONE HYDROCHLORIDE 5 MG/1
TABLET ORAL
Qty: 90 TABLET | Refills: 0 | Status: SHIPPED | OUTPATIENT
Start: 2024-06-27

## 2024-06-27 RX ORDER — QUETIAPINE FUMARATE 25 MG/1
TABLET, FILM COATED ORAL
Qty: 135 TABLET | Refills: 0 | Status: SHIPPED | OUTPATIENT
Start: 2024-06-27

## 2024-06-27 NOTE — PROGRESS NOTES
This provider is completing this appointment through [facility] (through [facility]), [address], [facility], [address] using a secure [employer] Video Visit through [employer]. Patient is being seen remotely via telehealth at their residence in [place], and stated they are in a secure environment for this session. The patient's condition being diagnosed/treated is appropriate for telemedicine. The provider identified herself as well as her credentials.   The patient, and/or patients guardian, consent to be seen remotely, and when consent is given they understand that the consent allows for patient identifiable information to be sent to a third party as needed.   They may refuse to be seen remotely at any time. The electronic data is encrypted and password protected, and the patient and/or guardian has been advised of the potential risks to privacy not withstanding such measures.    You have chosen to receive care through a telehealth visit.  Do you consent to use a video/audio connection for your medical care today? Yes    Patient identifiers utilized: Name and date of birth.        Subjective   Katie Vaughn is a 88 y.o. female who presents today for follow up    Chief Complaint:  Anxiety and sleep difficulties    Accompanied by: Pt's oldest son, Gabriel, and his wife, Tiffanie, are present during appointment with pt's verbal permission.     History of Present Illness:   Pt was last seen by this APRN on 5/1/24.  Pt reports she has been doing well overall. Pt's friend recently passed away from pancreatic cancer. They had been friends since the 1970's. Pt feels she is coping with it well. Pt has been communicating with friends. Pt is having difficulty falling and staying asleep despite taking Seroquel 25 mg PO QHS. There are a few nights a weeks it takes a few hours to fall asleep. Pt does not feel her mind is racing. Pt is averaging 5 hours of broken sleep. Pt usually wakes to use the  "restroom and is able to fall back asleep. Pt feels rested when she wakes. Pt states her energy level is poor during the day. Per pt, she thinks she may have just gotten \"lazy\". Pt spends some of her days doing chores. Pt's son will take her to town for appointments and shopping. Anxiety has been manageable. She does worry about her family. One of her son's adult children had to move back in with him. Pt's other son has experienced hearing loss and has tinnitus. She also has a grandson who is having some personal issues in his life. Pt worries about them and sometimes has difficulty not letting it affect her. Appetite is stable. Pt did not tolerate a higher dosage of Buspar and she does not wish to add an antidepressant at this time for anxiety. Pt is concerned she may experience night sweats again. Pt is agreeable to increasing the dose of Seroquel to 37.5 mg PO QHS for sleep and anxiety. The patient denies any new medical problems since last appointment with this facility. The patient reports compliance with current medication regimen. The patient denies any current side effects from their current medication regimen. The patient denies any abnormal muscle movements or tics. The patient rates their anxiety on average the past week at a 4-5/10 on a 0-10 scale, with 10 being the worst. The patient would like to increase their medications at this visit. The patient denies any suicidal or homicidal ideations, plans, or intent at today's encounter and is convincing. The patient denies any auditory hallucinations or visual hallucinations. The patient does not endorse any significant symptoms consistent with will or psychosis at today's encounter.     *If the patient has any concerns or needs assistance, they may call the [facility] at [phone]*        Prior Psychiatric Medications:  Klonopin  Zoloft - GI disturbance, took for a few days  Trazodone - ineffective at 25 mg  Ativan - did well " on it in the past   Doxepin - 10-20 mg ineffective for sleep  Elavil  Melatonin - ineffective  Magnesium Glycinate - ineffective  Lexapro - night sweats  Remeron - effective, possible night sweats      The following portions of the patient's history were reviewed and updated as appropriate: allergies, current medications, past family history, past medical history, past social history, past surgical history and problem list.          Past Medical History:  Past Medical History:   Diagnosis Date    Anxiety     Cataract     Disease of thyroid gland     Diverticulitis     Ovarian cancer     Positive TB test        Social History:  Social History     Socioeconomic History    Marital status:    Tobacco Use    Smoking status: Former     Current packs/day: 0.00     Average packs/day: 1 pack/day for 15.0 years (15.0 ttl pk-yrs)     Types: Cigarettes     Start date:      Quit date:      Years since quittin.5    Smokeless tobacco: Never   Vaping Use    Vaping status: Never Used   Substance and Sexual Activity    Alcohol use: No    Drug use: No    Sexual activity: Defer       Family History:  Family History   Problem Relation Age of Onset    Lung cancer Mother     Colon cancer Paternal Aunt     Colon cancer Maternal Uncle     Depression Sister     Suicide Attempts Nephew     Drug abuse Nephew     OCD Grandson        Past Surgical History:  Past Surgical History:   Procedure Laterality Date    APPENDECTOMY      CHOLECYSTECTOMY      HYSTERECTOMY         Problem List:  Patient Active Problem List   Diagnosis    Diffuse large B-cell lymphoma of lymph nodes of multiple regions    Encounter for antineoplastic chemotherapy       Allergy:   Allergies   Allergen Reactions    Latex Itching, Dermatitis and Rash    Betadine [Povidone Iodine] Itching     Pt says her skin also blistered    Sulfa Antibiotics Swelling    Penicillins Rash        Current Medications:   Current Outpatient Medications   Medication Sig  Dispense Refill    busPIRone (BUSPAR) 5 MG tablet Take 1/2 tablet PO QAM and every afternoon 90 tablet 0    dicyclomine (BENTYL) 10 MG capsule 1 po 30 min before meals prn abd cramps 90 capsule 2    metoprolol succinate XL (TOPROL-XL) 50 MG 24 hr tablet Take 1 tablet by mouth Daily. 90 tablet 1    QUEtiapine (SEROquel) 25 MG tablet Take 1-1.5 tablets PO  tablet 0    Synthroid 88 MCG tablet Take 1 tablet by mouth Daily. Give name brand 90 tablet 1     No current facility-administered medications for this visit.       Review of Symptoms:    Review of Systems   Constitutional: Negative.    Psychiatric/Behavioral:  Positive for stress. The patient is nervous/anxious.          Physical Exam:   Due to the remote nature of this encounter (virtual encounter), vitals were unable to be obtained.  Height stated at 63 inches.  Weight stated at 123 pounds.      Physical Exam  Neurological:      Mental Status: She is alert.   Psychiatric:         Attention and Perception: Attention and perception normal. She does not perceive auditory or visual hallucinations.         Mood and Affect: Affect normal.         Speech: Speech normal.         Behavior: Behavior normal. Behavior is cooperative.         Thought Content: Thought content normal. Thought content is not paranoid or delusional. Thought content does not include homicidal or suicidal ideation. Thought content does not include homicidal or suicidal plan.         Cognition and Memory: Cognition and memory normal.         Judgment: Judgment normal.      Comments: Normal mood with stress           Mental Status Exam:   Hygiene:   good  Cooperation:  Cooperative  Eye Contact:  Good  Psychomotor Behavior:  Appropriate  Affect:  Full range  Mood:  Normal mood with stress  Speech:  Normal  Thought Process:  Goal directed  Thought Content:  Normal  Suicidal:  None  Homicidal:  None  Hallucinations:  None  Delusion:  None  Memory:  Intact  Orientation:  Person, Place, Time, and  Situation  Reliability:  good  Insight:  Good  Judgement:  Good  Impulse Control:  Good        PHQ-9 Depression Screening  Little interest or pleasure in doing things? (P) 1-->several days   Feeling down, depressed, or hopeless? (P) 1-->several days   Trouble falling or staying asleep, or sleeping too much? (P) 1-->several days   Feeling tired or having little energy? (P) 0-->not at all   Poor appetite or overeating? (P) 0-->not at all   Feeling bad about yourself - or that you are a failure or have let yourself or your family down? (P) 0-->not at all   Trouble concentrating on things, such as reading the newspaper or watching television? (P) 0-->not at all   Moving or speaking so slowly that other people could have noticed? Or the opposite - being so fidgety or restless that you have been moving around a lot more than usual? (P) 0-->not at all   Thoughts that you would be better off dead, or of hurting yourself in some way? (P) 0-->not at all   PHQ-9 Total Score (P) 3   If you checked off any problems, how difficult have these problems made it for you to do your work, take care of things at home, or get along with other people? (P) somewhat difficult     PHQ-9 Total Score: (P) 3      YASMINE-7  Feeling nervous, anxious or on edge: (P) Several days  Not being able to stop or control worrying: (P) Several days  Worrying too much about different things: (P) Several days  Trouble Relaxing: (P) Several days  Being so restless that it is hard to sit still: (P) Not at all  Feeling afraid as if something awful might happen: (P) Not at all  Becoming easily annoyed or irritable: (P) Not at all  YASMINE 7 Total Score: (P) 4  If you checked any problems, how difficult have these problems made it for you to do your work, take care of things at home, or get along with other people: (P) Somewhat difficult    Previous Provider notes and available records reviewed by this APRN at today's encounter.         Lab Results:   No visits with  results within 1 Month(s) from this visit.   Latest known visit with results is:   Office Visit on 03/11/2024   Component Date Value Ref Range Status    Glucose 03/11/2024 90  70 - 99 mg/dL Final    BUN 03/11/2024 12  8 - 27 mg/dL Final    Creatinine 03/11/2024 1.04 (H)  0.57 - 1.00 mg/dL Final    EGFR Result 03/11/2024 52 (L)  >59 mL/min/1.73 Final    BUN/Creatinine Ratio 03/11/2024 12  12 - 28 Final    Sodium 03/11/2024 140  134 - 144 mmol/L Final    Potassium 03/11/2024 4.2  3.5 - 5.2 mmol/L Final    Chloride 03/11/2024 101  96 - 106 mmol/L Final    Total CO2 03/11/2024 25  20 - 29 mmol/L Final    Calcium 03/11/2024 9.3  8.7 - 10.3 mg/dL Final    Total Protein 03/11/2024 6.4  6.0 - 8.5 g/dL Final    Albumin 03/11/2024 4.3  3.7 - 4.7 g/dL Final    Globulin 03/11/2024 2.1  1.5 - 4.5 g/dL Final    A/G Ratio 03/11/2024 2.0  1.2 - 2.2 Final    Total Bilirubin 03/11/2024 0.4  0.0 - 1.2 mg/dL Final    Alkaline Phosphatase 03/11/2024 66  44 - 121 IU/L Final    AST (SGOT) 03/11/2024 19  0 - 40 IU/L Final    ALT (SGPT) 03/11/2024 11  0 - 32 IU/L Final    TSH 03/11/2024 3.500  0.450 - 4.500 uIU/mL Final    Urine Culture 03/11/2024 Final report   Final    Result 1 03/11/2024 Comment   Final    Comment: Mixed urogenital dale  Less than 10,000 colonies/mL      Color 03/11/2024 Yellow  Yellow, Straw, Dark Yellow, Delfina Final    Clarity, UA 03/11/2024 Clear  Clear Final    Glucose, UA 03/11/2024 Negative  Negative mg/dL Final    Bilirubin 03/11/2024 Small (1+) (A)  Negative Final    Ketones, UA 03/11/2024 Negative  Negative Final    Specific Gravity  03/11/2024 1.025  1.005 - 1.030 Final    Blood, UA 03/11/2024 1+ (A)  Negative Final    pH, Urine 03/11/2024 5.5  5.0 - 8.0 Final    Protein, POC 03/11/2024 Trace (A)  Negative mg/dL Final    Urobilinogen, UA 03/11/2024 0.2 E.U./dL  Normal, 0.2 E.U./dL Final    Leukocytes 03/11/2024 Negative  Negative Final    Nitrite, UA 03/11/2024 Negative  Negative Final    WBC 03/11/2024 6.3   3.4 - 10.8 x10E3/uL Final    RBC 03/11/2024 4.64  3.77 - 5.28 x10E6/uL Final    Hemoglobin 03/11/2024 13.9  11.1 - 15.9 g/dL Final    Hematocrit 03/11/2024 42.6  34.0 - 46.6 % Final    MCV 03/11/2024 92  79 - 97 fL Final    MCH 03/11/2024 30.0  26.6 - 33.0 pg Final    MCHC 03/11/2024 32.6  31.5 - 35.7 g/dL Final    RDW 03/11/2024 12.4  11.7 - 15.4 % Final    Platelets 03/11/2024 224  150 - 450 x10E3/uL Final    Neutrophil Rel % 03/11/2024 69  Not Estab. % Final    Lymphocyte Rel % 03/11/2024 17  Not Estab. % Final    Monocyte Rel % 03/11/2024 10  Not Estab. % Final    Eosinophil Rel % 03/11/2024 3  Not Estab. % Final    Basophil Rel % 03/11/2024 1  Not Estab. % Final    Neutrophils Absolute 03/11/2024 4.4  1.4 - 7.0 x10E3/uL Final    Lymphocytes Absolute 03/11/2024 1.1  0.7 - 3.1 x10E3/uL Final    Monocytes Absolute 03/11/2024 0.6  0.1 - 0.9 x10E3/uL Final    Eosinophils Absolute 03/11/2024 0.2  0.0 - 0.4 x10E3/uL Final    Basophils Absolute 03/11/2024 0.1  0.0 - 0.2 x10E3/uL Final    Immature Granulocyte Rel % 03/11/2024 0  Not Estab. % Final    Immature Grans Absolute 03/11/2024 0.0  0.0 - 0.1 x10E3/uL Final    Total Cholesterol 03/11/2024 182  100 - 199 mg/dL Final    Triglycerides 03/11/2024 98  0 - 149 mg/dL Final    HDL Cholesterol 03/11/2024 44  >39 mg/dL Final    VLDL Cholesterol Marco 03/11/2024 18  5 - 40 mg/dL Final    LDL Chol Calc (NIH) 03/11/2024 120 (H)  0 - 99 mg/dL Final         Assessment & Plan   Problems Addressed this Visit    None  Visit Diagnoses       Generalized anxiety disorder  (Chronic)   -  Primary    Relevant Medications    QUEtiapine (SEROquel) 25 MG tablet    busPIRone (BUSPAR) 5 MG tablet    Sleep difficulties  (Chronic)       Relevant Medications    QUEtiapine (SEROquel) 25 MG tablet          Diagnoses         Codes Comments    Generalized anxiety disorder    -  Primary ICD-10-CM: F41.1  ICD-9-CM: 300.02     Sleep difficulties     ICD-10-CM: G47.9  ICD-9-CM: 780.50             Visit  Diagnoses:    ICD-10-CM ICD-9-CM   1. Generalized anxiety disorder  F41.1 300.02   2. Sleep difficulties  G47.9 780.50         Rule out MDD       GOALS:  Short Term Goals: Patient will be compliant with medication, and patient will have no significant medication related side effects.  Patient will be engaged in psychotherapy as indicated.  Patient will report subjective improvement of symptoms.  Long term goals: To stabilize mood and treat/improve subjective symptoms, the patient will stay out of the hospital, the patient will be at an optimal level of functioning, and the patient will take all medications as prescribed.  The patient verbalized understanding and agreement with goals that were mutually set.      TREATMENT PLAN: Continue supportive psychotherapy efforts and medications as indicated.  Medication and treatment options, both pharmacological and non-pharmacological treatment options, discussed during today's visit, including any off label use of medication. Patient acknowledged and verbally consented with current treatment plan and was educated on the importance of compliance with treatment and follow-up appointments.      -Continue Buspar 2.5 mg PO BID for anxiety  -Increase Seroquel to 37.5 mg PO QHS for sleep and anxiety. Pt may decrease the Seroquel back to 25 mg PO QHS if she feels it is too much or feels groggy the following morning. Pt verbalized understanding.   -Pt requests 8 week follow up      MEDICATION ISSUES:  Discussed treatment plan and medication options of prescribed medication as well as the risks, benefits, any black box warnings, and side effects including potential falls, possible impaired driving, and metabolic adversities among others, including any off label use of medication. Patient is agreeable to call the office with any worsening of symptoms or onset of side effects, or if any concerns or questions arise.  The contact information for the office is made available to the  patient. Patient is agreeable to call 911 or go to the nearest ER should they begin having any SI/HI, or if any urgent concerns arise.       VERBAL INFORMED CONSENT FOR MEDICATION:  The patient was educated that their proposed/prescribed psychotropic medication(s) has potential risks, side effects, adverse effects, and black box warnings; and these have been discussed with the patient.  The patient has been informed that their treatment and medication dosage is to be individualized, and may even be above or below the recommended range/dosage due to patient individualization and response, but medication is prescribed using a shared decision making approach, and no medication or dosage will be prescribed without the patient's verbal consent.  The reason for the use of the medication including any off label use and alternative modes of treatment other than or in addition to medication has been considered and discussed, the probable consequences of not receiving the proposed treatment have been discussed, and any treatment side effects, black box warnings, and cautions associated with treatment have been discussed with the patient.  The patient is allowed ample time to openly discuss and ask questions regarding the proposed medication(s) and treatment plan and the patient verbalizes understanding the reasons for the use of the medication, its potential risks and benefits, other alternative treatment(s), and the probable consequences that may occur if the proposed medication is not given.  The patient has been given ample time to ask questions and study the information and find the information to be specific, accurate, and complete.  The patient gives verbal consent for the medication(s) proposed/prescribed, they verbalized understanding that they can refuse and withdraw consent at any time with the assistance of this APRN, and the patient has verbally confirmed that they are aware, and are willing, to take the  prescribed medication and follow the treatment plan with the known possible risks, side effect, black box warnings, and any potential medication interactions, and the patient reports they will be worse off without this medication and treatment plan.  The patient is advised to contact this APRN/this office if any questions or concerns arise at any time (at [phone]), or call 911/go to the closest emergency department if needed or outside of office hours.      SUICIDE RISK ASSESSMENT AND SAFETY PLAN: Unalterable demographics and a history of mental health intervention indicate this patient is in a high risk category compared to the general population. At present, the patient denies active SI/HI, intentions, or plans at this time and agrees to seek immediate care should such thoughts develop. The patient verbalizes understanding of how to access emergency care if needed and agrees to do so. Consideration of suicide risk and protective factors such as history, current presentation, individual strengths and weaknesses, psychosocial and environmental stressors and variables, psychiatric illness and symptoms, medical conditions and pain, took place in this interview. Based on those considerations, the patient is determined: within individual baseline and presenting no imminent risk for suicide or homicide. Other recommendations: The patient does not meet the criteria for inpatient admission and is not a safety risk to self or others at today's visit. Inpatient treatment offers no significant advantages over outpatient treatment for this patient at today's visit.  The patient was given ample time for questions and fully participated in treatment planning.  The patient was encouraged to call the clinic with any questions or concerns.  The patient was informed of access to emergency care. If patient were to develop any significant symptomatology, suicidal ideation, homicidal ideation, any concerns, or feel unsafe at any  time they are to call the clinic and if unable to get immediate assistance should immediately call 911 or go to the nearest emergency room.  Patient contracted verbally for the following: If you are experiencing an emotional crisis or have thoughts of harming yourself or others, please go to your nearest local emergency room or call 911. Will continue to re-assess medication response and side effects frequently to establish efficacy and ensure safety. Risks, any black box warnings, side effects, off label usage, and benefits of medication and treatment discussed with patient, along with potential adverse side effects of current and/or newly prescribed medication, alternative treatment options, and OTC medications.  Patient verbalized understanding of potential risks, any off label use of medication, any black box warnings, and any side effects in their own words. The patient verbalized understanding and agreed to comply with the safety plan discussed in their own words.  Patient given the number to the office. Number also discussed of the 24- hour suicide hotline.       MEDS ORDERED DURING VISIT:  New Medications Ordered This Visit   Medications    QUEtiapine (SEROquel) 25 MG tablet     Sig: Take 1-1.5 tablets PO QHS     Dispense:  135 tablet     Refill:  0    busPIRone (BUSPAR) 5 MG tablet     Sig: Take 1/2 tablet PO QAM and every afternoon     Dispense:  90 tablet     Refill:  0       Return in about 8 weeks (around 8/22/2024), or if symptoms worsen or fail to improve, for Recheck.     Treatment plan completed: 10/25/23    Progress toward goal: Not at goal    Functional Status: Mild impairment     Prognosis: Good with Ongoing Treatment       This patient will not be seen for the in person visits due to the following exception(s): It would be a hardship for this patient to see a provider in person.    It is my professional opinion that the patient is clinically stable and an in person visit will risk worsening the  patient's condition creating undue hardship.           This document has been electronically signed by TOÑO Anthony  June 27, 2024 13:56 EDT    Some of the data in this electronic note has been brought forward from a previous encounter, any necessary changes have been made, it has been reviewed by this APRN, and it is accurate.    Please note that portions of this note were completed with a voice recognition program. Efforts were made to edit dictation, but occasionally words are mistranscribed.

## 2024-07-22 ENCOUNTER — TELEPHONE (OUTPATIENT)
Dept: PSYCHIATRY | Facility: CLINIC | Age: 89
End: 2024-07-22
Payer: MEDICARE

## 2024-07-22 NOTE — TELEPHONE ENCOUNTER
Patient's son, Gabriel Vaughn called in stated that he wanted to get a message to moms provider, Katy.  Stated patient is having dizziness and weakness, stated provider has been adjusting patients medication and he is wondering if that is causing issues. Wants provider to call patient.  Please advise.

## 2024-07-22 NOTE — TELEPHONE ENCOUNTER
Patient called back, stated that she started the new dose on the 28th of 1.5 pills. Around the 14th she started feeling dizzy and week, stated she then broke the half pill into another half and only took 1 and 1/4 pills. Around this Saturday she felt worse, stated she then started taking only one pill. Saturday she did feel some better, but doesn't feel great today.  Stated she has been checking her blood pressure when she feels bad, it has been low.  Today's bp was 149/78.

## 2024-07-22 NOTE — TELEPHONE ENCOUNTER
Please have pt reduce the Seroquel back to 25 mg PO QHS. Also, advise her to check her BP in the morning and evening and to write them down. If she is not feeling better by Thursday, please have her call me back.

## 2024-07-22 NOTE — TELEPHONE ENCOUNTER
Please call and ask when the dizziness and weakness began. What dose of the Seroquel (quetiapine) has the patient been taking and since what date? I saw the patient on 6/28/24 and it was increased at that time to 37.5 mg PO QHS. Have they been checking pt's BP when she feels dizzy?

## 2024-07-22 NOTE — TELEPHONE ENCOUNTER
Called patient back, left voicemail letting patient know what provider ask and for her to call the office back.

## 2024-07-23 ENCOUNTER — OFFICE VISIT (OUTPATIENT)
Dept: FAMILY MEDICINE CLINIC | Facility: CLINIC | Age: 89
End: 2024-07-23
Payer: MEDICARE

## 2024-07-23 VITALS
WEIGHT: 123 LBS | SYSTOLIC BLOOD PRESSURE: 144 MMHG | OXYGEN SATURATION: 95 % | HEIGHT: 63 IN | DIASTOLIC BLOOD PRESSURE: 76 MMHG | BODY MASS INDEX: 21.79 KG/M2 | HEART RATE: 95 BPM

## 2024-07-23 DIAGNOSIS — R35.0 INCREASED URINARY FREQUENCY: ICD-10-CM

## 2024-07-23 DIAGNOSIS — N30.00 ACUTE CYSTITIS WITHOUT HEMATURIA: Primary | ICD-10-CM

## 2024-07-23 DIAGNOSIS — B35.4 TINEA CORPORIS: ICD-10-CM

## 2024-07-23 DIAGNOSIS — R42 DIZZINESS: ICD-10-CM

## 2024-07-23 LAB
BILIRUB BLD-MCNC: NEGATIVE MG/DL
CLARITY, POC: CLEAR
COLOR UR: YELLOW
EXPIRATION DATE: ABNORMAL
GLUCOSE UR STRIP-MCNC: NEGATIVE MG/DL
KETONES UR QL: NEGATIVE
LEUKOCYTE EST, POC: ABNORMAL
Lab: ABNORMAL
NITRITE UR-MCNC: NEGATIVE MG/ML
PH UR: 5.5 [PH] (ref 5–8)
PROT UR STRIP-MCNC: ABNORMAL MG/DL
RBC # UR STRIP: ABNORMAL /UL
SP GR UR: 1.02 (ref 1–1.03)
UROBILINOGEN UR QL: ABNORMAL

## 2024-07-23 PROCEDURE — 81003 URINALYSIS AUTO W/O SCOPE: CPT | Performed by: PHYSICIAN ASSISTANT

## 2024-07-23 PROCEDURE — 99214 OFFICE O/P EST MOD 30 MIN: CPT | Performed by: PHYSICIAN ASSISTANT

## 2024-07-23 PROCEDURE — 1126F AMNT PAIN NOTED NONE PRSNT: CPT | Performed by: PHYSICIAN ASSISTANT

## 2024-07-23 RX ORDER — CEFDINIR 300 MG/1
300 CAPSULE ORAL 2 TIMES DAILY
Qty: 14 CAPSULE | Refills: 0 | Status: SHIPPED | OUTPATIENT
Start: 2024-07-23 | End: 2024-07-30

## 2024-07-23 RX ORDER — CLOTRIMAZOLE AND BETAMETHASONE DIPROPIONATE 10; .64 MG/G; MG/G
1 CREAM TOPICAL 2 TIMES DAILY
Qty: 30 G | Refills: 0 | Status: SHIPPED | OUTPATIENT
Start: 2024-07-23 | End: 2024-08-06

## 2024-07-23 NOTE — PROGRESS NOTES
".Chief Complaint  Dizziness (Dizzy spells started a week ago, medication is possible cause) and Rash    Subjective          History of Present Illness  Katie Vaughn is here today with her  History of Present Illness  The patient is an 88-year-old female here for dizziness.    The patient began experiencing dizziness approximately one week ago. She is currently on a regimen of quetiapine, which aids in sleep. Her quetiapine dosage was increased at the end of 06/2023, and she began experiencing symptoms approximately three weeks later. Her dosage was initially 25 mg, 1 and 1/2 tab at bedtime. However, three days ago, she reduced her dosage to 25 mg.one tablet at bedtime/  She denies experiencing any fever or back pain.    The patient suspects a urinary tract infection. She reports experiencing stinging and burning sensations when urinating She states that this has been going on for approximately one week. She also reports increased urinary frequency and urinary incontinence, necessitating the use of pads.    Patient also notes lesion on her skin on her abdomen  that has never quite gone away.  In the past he was diagnosed with eczema she will use some triamcinolone to the area which improved but only to have it come back.  She states she has similar lesions on her buttocks as well.   She is allergic to SULFA DRUGS and PENICILLIN.    Objective   Vital Signs:   /76 (BP Location: Left arm, Patient Position: Sitting)   Pulse 95   Ht 160 cm (63\")   Wt 55.8 kg (123 lb)   SpO2 95%   BMI 21.79 kg/m²     Body mass index is 21.79 kg/m².  BMI is within normal parameters. No other follow-up for BMI required.      Review of Systems      Current Outpatient Medications:     busPIRone (BUSPAR) 5 MG tablet, Take 1/2 tablet PO QAM and every afternoon, Disp: 90 tablet, Rfl: 0    dicyclomine (BENTYL) 10 MG capsule, 1 po 30 min before meals prn abd cramps, Disp: 90 capsule, Rfl: 2    metoprolol succinate XL (TOPROL-XL) 50 MG 24 " hr tablet, Take 1 tablet by mouth Daily., Disp: 90 tablet, Rfl: 1    QUEtiapine (SEROquel) 25 MG tablet, Take 1-1.5 tablets PO QHS, Disp: 135 tablet, Rfl: 0    Synthroid 88 MCG tablet, Take 1 tablet by mouth Daily. Give name brand, Disp: 90 tablet, Rfl: 1    cefdinir (OMNICEF) 300 MG capsule, Take 1 capsule by mouth 2 (Two) Times a Day for 7 days., Disp: 14 capsule, Rfl: 0    clotrimazole-betamethasone (LOTRISONE) 1-0.05 % cream, Apply 1 Application topically to the appropriate area as directed 2 (Two) Times a Day for 14 days., Disp: 30 g, Rfl: 0    Allergies: Latex, Betadine [povidone iodine], Sulfa antibiotics, and Penicillins    Physical Exam  Vitals and nursing note reviewed.   Constitutional:       General: She is not in acute distress.     Appearance: Normal appearance. She is not ill-appearing.   HENT:      Head: Normocephalic and atraumatic.      Right Ear: Tympanic membrane and ear canal normal.      Left Ear: Tympanic membrane and ear canal normal.      Nose: Nose normal.      Mouth/Throat:      Mouth: Mucous membranes are moist.   Cardiovascular:      Rate and Rhythm: Normal rate and regular rhythm.      Heart sounds: Normal heart sounds.   Pulmonary:      Effort: Pulmonary effort is normal.      Breath sounds: Normal breath sounds.   Abdominal:       Neurological:      Mental Status: She is alert and oriented to person, place, and time. Mental status is at baseline.   Psychiatric:         Mood and Affect: Mood normal.        Physical Exam      Result Review :          Results  Laboratory Studies  Urine test shows a urinary tract infection.             Assessment and Plan    Diagnoses and all orders for this visit:    1. Acute cystitis without hematuria (Primary)  -     cefdinir (OMNICEF) 300 MG capsule; Take 1 capsule by mouth 2 (Two) Times a Day for 7 days.  Dispense: 14 capsule; Refill: 0  Patient's urine indicates a urinary tract infection and will place on back for this.  Will send her urine off for  culture.  Discussed with patient that this can certainly make her dizzy.  2. Dizziness  -     POC Urinalysis Dipstick, Automated  -     Urine Culture - Urine, Urine, Clean Catch  We also discussed that when she stands up her blood pressure drops to 120 from 144.  We discussed that this can also make her dizzy probably indicates that she is a bit dehydrated I will have her increase her fluids.  Continue on lower dose of quetiapine.  3. Increased urinary frequency  -     Urine Culture - Urine, Urine, Clean Catch    4. Tinea corporis  -     clotrimazole-betamethasone (LOTRISONE) 1-0.05 % cream; Apply 1 Application topically to the appropriate area as directed 2 (Two) Times a Day for 14 days.  Dispense: 30 g; Refill: 0  Have also given her Lotrisone lotion to use on the areas with    Assessment & Plan        Follow Up   No follow-ups on file.  Patient was given instructions and counseling regarding her condition or for health maintenance advice. Please see specific information pulled into the AVS if appropriate.     Patient or patient representative verbalized consent for the use of Ambient Listening during the visit with  MARIA LUZ Ayers for chart documentation. 7/23/2024  16:54 EDT    MARIA LUZ Ayers  07/23/2024

## 2024-07-24 ENCOUNTER — TELEPHONE (OUTPATIENT)
Dept: PSYCHIATRY | Facility: CLINIC | Age: 89
End: 2024-07-24
Payer: MEDICARE

## 2024-07-24 NOTE — TELEPHONE ENCOUNTER
Can you please add pt to my schedule tomorrow at 2:30 PM? Please have her discontinue the Seroquel at this time. We will discuss other medication options for sleep at the appointment. Thank you.

## 2024-07-24 NOTE — TELEPHONE ENCOUNTER
Patient called to update provider on how she is doing, went to doctor yesterday, stated she has a UTI and was put on antibiotics. Her blood pressure is going up and down still  Stated she has still been getting dizzy, and is happening right after she takes her Seroquel.  Seems to be more dizzy at night after taking Seroquel.     Blood pressure readings:  7/22 -am 149/78 pm 139/73  7/23 -am 158/86 pm 135/72  7/24-am-158/78 pm 169/101

## 2024-07-24 NOTE — TELEPHONE ENCOUNTER
Called patient to let her know to stop the seroquel today, and scheduled her with provider for 7/25 at 2:30.

## 2024-07-25 ENCOUNTER — TELEMEDICINE (OUTPATIENT)
Dept: PSYCHIATRY | Facility: CLINIC | Age: 89
End: 2024-07-25
Payer: MEDICARE

## 2024-07-25 DIAGNOSIS — F41.1 GENERALIZED ANXIETY DISORDER: Primary | Chronic | ICD-10-CM

## 2024-07-25 DIAGNOSIS — G47.9 SLEEP DIFFICULTIES: Chronic | ICD-10-CM

## 2024-07-25 LAB
BACTERIA UR CULT: NORMAL
BACTERIA UR CULT: NORMAL

## 2024-07-25 RX ORDER — MIRTAZAPINE 15 MG/1
TABLET, FILM COATED ORAL
Qty: 90 TABLET | Refills: 0 | Status: SHIPPED | OUTPATIENT
Start: 2024-07-25

## 2024-07-25 NOTE — PROGRESS NOTES
Please call patient and let her know that her urine culture came back negative for a urine infection. She can go ahead and finish her antibiotics if she wishes- would encourage her to spend a little extra time voiding to empty bladder completely. If her symptoms are not improving she is to return to the clinic thanks

## 2024-07-25 NOTE — PROGRESS NOTES
This provider is completing this appointment through [facility] (through [facility]), [address], [facility], [address] using a secure [employer] Video Visit through [employer]. Patient is being seen remotely via telehealth at their residence in [place], and stated they are in a secure environment for this session. The patient's condition being diagnosed/treated is appropriate for telemedicine. The provider identified herself as well as her credentials.   The patient, and/or patients guardian, consent to be seen remotely, and when consent is given they understand that the consent allows for patient identifiable information to be sent to a third party as needed.   They may refuse to be seen remotely at any time. The electronic data is encrypted and password protected, and the patient and/or guardian has been advised of the potential risks to privacy not withstanding such measures.    You have chosen to receive care through a telehealth visit.  Do you consent to use a video/audio connection for your medical care today? Yes    Patient identifiers utilized: Name and date of birth.        Subjective   Katie Vaughn is a 88 y.o. female who presents today for follow up    Chief Complaint:  Anxiety and sleep difficulties    Accompanied by: Pt's oldest son, Gabriel, and his wife, Tiffanie, are present during appointment with pt's verbal permission.    History of Present Illness:   *Pt was last seen by this APRN on 6/27/24. At that time, pt's Seroquel was increased to 37.5 mg PO QHS. Pt's son called on 7/22/24 stating pt has been experiencing dizziness and weakness that began on 7/14/24. He reported pt has been taking the 37.5 mg of Seroquel since 6/28/24. This APRN recommended decreased Seroquel to 25 mg PO QHS, monitor BP BID, and stay hydrated. They were advised if the issue continues the next few days to call back. On 7/24/24, pt called stating she saw her PCP on 7/23/24 and was found to have  a UTI and antibiotics were ordered. The PCP told her at that time the dizziness could be related to the UTI. It was also reported on the call that pt becomes dizzy after taking the Seroquel and it mostly occurs at HS. Pt was advised to discontinue the Seroquel and be seen for an appointment today.*    Per pt, she still is not feeling well. She discontinued the Seroquel and took melatonin last night. Pt did not experience dizziness last night. Pt states she slept, but it was not very restful and poor quality. Pt has to get up during the night to use the restroom. Pt reports the dizziness and weakness have been worse in the morning and at night. There was an instance where she felt dizzy just lying in bed and it worsened when she got out of bed. Pt feels the Seroquel may be the cause. She also did not notice any benefit from the higher dose of Seroquel. Pt started an antibiotic for her UTI on Tuesday. Discussed restarting Remeron 7.5-15 mg PO QHS PRN after she completed her antibiotic next Tuesday as it may not have been what was causing the night sweats. Pt is agreeable. She will take OTC melatonin until she complete the antibiotic. Pt has not been going outside much due to the heat. She and her  like to sit on the porch in the evening when it cools down. Pt socializes mostly with her family. She recently enjoyed a visit her great grandchildren. Pt goes out to stores about twice a week. Appetite has decreased since she has not been feeling well. Pt's  has been slightly more forgetful. Pt stimulates her mind with reading, word searches, crosswords puzzles, puzzles, etc. Anxiety has been manageable. The patient denies any new medical problems since last appointment with this facility with the exception of a UTI. The patient reports compliance with current medication regimen. The patient denies any suicidal or homicidal ideations, plans, or intent at today's encounter and is convincing. The patient denies  any auditory hallucinations or visual hallucinations. The patient does not endorse any significant symptoms consistent with will or psychosis at today's encounter.     *If the patient has any concerns or needs assistance, they may call the [facility] at [phone]*        Prior Psychiatric Medications:  Klonopin  Zoloft - GI disturbance, took for a few days  Trazodone - ineffective at 25 mg  Ativan - did well on it in the past   Doxepin - 10-20 mg ineffective for sleep  Elavil  Melatonin - ineffective  Magnesium Glycinate - ineffective  Lexapro - night sweats  Remeron - effective, possible night sweats      The following portions of the patient's history were reviewed and updated as appropriate: allergies, current medications, past family history, past medical history, past social history, past surgical history and problem list.          Past Medical History:  Past Medical History:   Diagnosis Date    Anxiety     Cataract     Disease of thyroid gland     Diverticulitis     Ovarian cancer     Positive TB test        Social History:  Social History     Socioeconomic History    Marital status:    Tobacco Use    Smoking status: Former     Current packs/day: 0.00     Average packs/day: 1 pack/day for 15.0 years (15.0 ttl pk-yrs)     Types: Cigarettes     Start date:      Quit date:      Years since quittin.6    Smokeless tobacco: Never   Vaping Use    Vaping status: Never Used   Substance and Sexual Activity    Alcohol use: No    Drug use: No    Sexual activity: Defer       Family History:  Family History   Problem Relation Age of Onset    Lung cancer Mother     Colon cancer Paternal Aunt     Colon cancer Maternal Uncle     Depression Sister     Suicide Attempts Nephew     Drug abuse Nephew     OCD Grandson        Past Surgical History:  Past Surgical History:   Procedure Laterality Date    APPENDECTOMY      CHOLECYSTECTOMY      HYSTERECTOMY         Problem  List:  Patient Active Problem List   Diagnosis    Diffuse large B-cell lymphoma of lymph nodes of multiple regions    Encounter for antineoplastic chemotherapy       Allergy:   Allergies   Allergen Reactions    Latex Itching, Dermatitis and Rash    Betadine [Povidone Iodine] Itching     Pt says her skin also blistered    Sulfa Antibiotics Swelling    Penicillins Rash        Current Medications:   Current Outpatient Medications   Medication Sig Dispense Refill    busPIRone (BUSPAR) 5 MG tablet Take 1/2 tablet PO QAM and every afternoon 90 tablet 0    cefdinir (OMNICEF) 300 MG capsule Take 1 capsule by mouth 2 (Two) Times a Day for 7 days. 14 capsule 0    clotrimazole-betamethasone (LOTRISONE) 1-0.05 % cream Apply 1 Application topically to the appropriate area as directed 2 (Two) Times a Day for 14 days. 30 g 0    dicyclomine (BENTYL) 10 MG capsule 1 po 30 min before meals prn abd cramps 90 capsule 2    metoprolol succinate XL (TOPROL-XL) 50 MG 24 hr tablet Take 1 tablet by mouth Daily. 90 tablet 1    mirtazapine (REMERON) 15 MG tablet Take 1/2-1 tablet PO QHS PRN for sleep. Please note that the lower the dose is more sedating 90 tablet 0    Synthroid 88 MCG tablet Take 1 tablet by mouth Daily. Give name brand 90 tablet 1     No current facility-administered medications for this visit.       Review of Symptoms:    Review of Systems   Constitutional:  Positive for appetite change and fatigue.   Neurological:  Positive for weakness.   Psychiatric/Behavioral:  Positive for stress.          Physical Exam:   Due to the remote nature of this encounter (virtual encounter), vitals were unable to be obtained.  Height stated at 63 inches.  Weight stated at 123 pounds.      Physical Exam  Neurological:      Mental Status: She is alert.   Psychiatric:         Attention and Perception: Attention and perception normal. She does not perceive auditory or visual hallucinations.         Mood and Affect: Mood and affect normal.          Speech: Speech normal.         Behavior: Behavior normal. Behavior is cooperative.         Thought Content: Thought content normal. Thought content is not paranoid or delusional. Thought content does not include homicidal or suicidal ideation. Thought content does not include homicidal or suicidal plan.         Cognition and Memory: Cognition and memory normal.         Judgment: Judgment normal.           Mental Status Exam:   Hygiene:   good  Cooperation:  Cooperative  Eye Contact:  Good  Psychomotor Behavior:  Appropriate  Affect:  Full range  Mood: normal  Speech:  Normal  Thought Process:  Goal directed  Thought Content:  Normal  Suicidal:  None  Homicidal:  None  Hallucinations:  None  Delusion:  None  Memory:  Intact  Orientation:  Person, Place, Time, and Situation  Reliability:  fair  Insight:  Fair  Judgement:  Fair  Impulse Control:  Good        Patient Health Questionnaire-9 (PHQ-9) (Depression Screening Tool)  Little interest or pleasure in doing things? (P) 1-->several days   Feeling down, depressed, or hopeless? (P) 0-->not at all   Trouble falling or staying asleep, or sleeping too much? (P) 2-->more than half the days   Feeling tired or having little energy? (P) 2-->more than half the days   Poor appetite or overeating? (P) 1-->several days   Feeling bad about yourself - or that you are a failure or have let yourself or your family down? (P) 0-->not at all   Trouble concentrating on things, such as reading the newspaper or watching television? (P) 0-->not at all   Moving or speaking so slowly that other people could have noticed? Or the opposite - being so fidgety or restless that you have been moving around a lot more than usual? (P) 0-->not at all   Thoughts that you would be better off dead, or of hurting yourself in some way? (P) 0-->not at all   PHQ-9 Total Score (P) 6   If you checked off any problems, how difficult have these problems made it for you to do your work, take care of things at  home, or get along with other people? (P) somewhat difficult     PHQ-9 Total Score: (P) 6      Generalized Anxiety Disorder 7-Item (YASMINE-7) Screening Tool  Feeling nervous, anxious or on edge: (P) Several days  Not being able to stop or control worrying: (P) Several days  Worrying too much about different things: (P) Several days  Trouble Relaxing: (P) Several days  Being so restless that it is hard to sit still: (P) Not at all  Feeling afraid as if something awful might happen: (P) Not at all  Becoming easily annoyed or irritable: (P) Not at all  YASMINE 7 Total Score: (P) 4  If you checked any problems, how difficult have these problems made it for you to do your work, take care of things at home, or get along with other people: (P) Somewhat difficult      Previous Provider notes and available records reviewed by this APRN at today's encounter.         Lab Results:   Office Visit on 07/23/2024   Component Date Value Ref Range Status    Color 07/23/2024 Yellow  Yellow, Straw, Dark Yellow, Delfina Final    Clarity, UA 07/23/2024 Clear  Clear Final    Specific Gravity  07/23/2024 1.025  1.005 - 1.030 Final    pH, Urine 07/23/2024 5.5  5.0 - 8.0 Final    Leukocytes 07/23/2024 Small (1+) (A)  Negative Final    Nitrite, UA 07/23/2024 Negative  Negative Final    Protein, POC 07/23/2024 1+ (A)  Negative mg/dL Final    Glucose, UA 07/23/2024 Negative  Negative mg/dL Final    Ketones, UA 07/23/2024 Negative  Negative Final    Urobilinogen, UA 07/23/2024 0.2 E.U./dL  Normal, 0.2 E.U./dL Final    Bilirubin 07/23/2024 Negative  Negative Final    Blood, UA 07/23/2024 1+ (A)  Negative Final    Lot Number 07/23/2024 [id number]   Final    Expiration Date 07/23/2024 4/30/25   Final    Urine Culture 07/23/2024 Final report   Final    Result 1 07/23/2024 Comment   Final    Comment: Mixed urogenital dale  10,000-25,000 colony forming units per mL           Assessment & Plan   Problems Addressed this Visit    None  Visit Diagnoses        Generalized anxiety disorder  (Chronic)   -  Primary    Relevant Medications    mirtazapine (REMERON) 15 MG tablet    Sleep difficulties  (Chronic)       Relevant Medications    mirtazapine (REMERON) 15 MG tablet          Diagnoses         Codes Comments    Generalized anxiety disorder    -  Primary ICD-10-CM: F41.1  ICD-9-CM: 300.02     Sleep difficulties     ICD-10-CM: G47.9  ICD-9-CM: 780.50             Visit Diagnoses:    ICD-10-CM ICD-9-CM   1. Generalized anxiety disorder  F41.1 300.02   2. Sleep difficulties  G47.9 780.50         Rule out MDD       GOALS:  Short Term Goals: Patient will be compliant with medication, and patient will have no significant medication related side effects.  Patient will be engaged in psychotherapy as indicated.  Patient will report subjective improvement of symptoms.  Long term goals: To stabilize mood and treat/improve subjective symptoms, the patient will stay out of the hospital, the patient will be at an optimal level of functioning, and the patient will take all medications as prescribed.  The patient verbalized understanding and agreement with goals that were mutually set.      TREATMENT PLAN: Continue supportive psychotherapy efforts and medications as indicated.  Medication and treatment options, both pharmacological and non-pharmacological treatment options, discussed during today's visit, including any off label use of medication. Patient acknowledged and verbally consented with current treatment plan and was educated on the importance of compliance with treatment and follow-up appointments.      -Continue Buspar 2.5 mg PO BID for anxiety  -After completing antibiotic next Tuesday, start Remeron 7.5-15 mg PO QHS PRN for sleep  -Discontinue Seroquel      MEDICATION ISSUES:  Discussed treatment plan and medication options of prescribed medication as well as the risks, benefits, any black box warnings, and side effects including potential falls, possible impaired driving,  and metabolic adversities among others, including any off label use of medication. Patient is agreeable to call the office with any worsening of symptoms or onset of side effects, or if any concerns or questions arise.  The contact information for the office is made available to the patient. Patient is agreeable to call 911 or go to the nearest ER should they begin having any SI/HI, or if any urgent concerns arise.       VERBAL INFORMED CONSENT FOR MEDICATION:  The patient was educated that their proposed/prescribed psychotropic medication(s) has potential risks, side effects, adverse effects, and black box warnings; and these have been discussed with the patient.  The patient has been informed that their treatment and medication dosage is to be individualized, and may even be above or below the recommended range/dosage due to patient individualization and response, but medication is prescribed using a shared decision making approach, and no medication or dosage will be prescribed without the patient's verbal consent.  The reason for the use of the medication including any off label use and alternative modes of treatment other than or in addition to medication has been considered and discussed, the probable consequences of not receiving the proposed treatment have been discussed, and any treatment side effects, black box warnings, and cautions associated with treatment have been discussed with the patient.  The patient is allowed ample time to openly discuss and ask questions regarding the proposed medication(s) and treatment plan and the patient verbalizes understanding the reasons for the use of the medication, its potential risks and benefits, other alternative treatment(s), and the probable consequences that may occur if the proposed medication is not given.  The patient has been given ample time to ask questions and study the information and find the information to be specific, accurate, and complete.  The  patient gives verbal consent for the medication(s) proposed/prescribed, they verbalized understanding that they can refuse and withdraw consent at any time with the assistance of this APRN, and the patient has verbally confirmed that they are aware, and are willing, to take the prescribed medication and follow the treatment plan with the known possible risks, side effect, black box warnings, and any potential medication interactions, and the patient reports they will be worse off without this medication and treatment plan.  The patient is advised to contact this APRN/this office if any questions or concerns arise at any time (at [phone]), or call 911/go to the closest emergency department if needed or outside of office hours.      SUICIDE RISK ASSESSMENT AND SAFETY PLAN: Unalterable demographics and a history of mental health intervention indicate this patient is in a high risk category compared to the general population. At present, the patient denies active SI/HI, intentions, or plans at this time and agrees to seek immediate care should such thoughts develop. The patient verbalizes understanding of how to access emergency care if needed and agrees to do so. Consideration of suicide risk and protective factors such as history, current presentation, individual strengths and weaknesses, psychosocial and environmental stressors and variables, psychiatric illness and symptoms, medical conditions and pain, took place in this interview. Based on those considerations, the patient is determined: within individual baseline and presenting no imminent risk for suicide or homicide. Other recommendations: The patient does not meet the criteria for inpatient admission and is not a safety risk to self or others at today's visit. Inpatient treatment offers no significant advantages over outpatient treatment for this patient at today's visit.  The patient was given ample time for questions and fully participated in treatment  planning.  The patient was encouraged to call the clinic with any questions or concerns.  The patient was informed of access to emergency care. If patient were to develop any significant symptomatology, suicidal ideation, homicidal ideation, any concerns, or feel unsafe at any time they are to call the clinic and if unable to get immediate assistance should immediately call 911 or go to the nearest emergency room.  Patient contracted verbally for the following: If you are experiencing an emotional crisis or have thoughts of harming yourself or others, please go to your nearest local emergency room or call 911. Will continue to re-assess medication response and side effects frequently to establish efficacy and ensure safety. Risks, any black box warnings, side effects, off label usage, and benefits of medication and treatment discussed with patient, along with potential adverse side effects of current and/or newly prescribed medication, alternative treatment options, and OTC medications.  Patient verbalized understanding of potential risks, any off label use of medication, any black box warnings, and any side effects in their own words. The patient verbalized understanding and agreed to comply with the safety plan discussed in their own words.  Patient given the number to the office. Number also discussed of the 24- hour suicide hotline.       MEDS ORDERED DURING VISIT:  New Medications Ordered This Visit   Medications    mirtazapine (REMERON) 15 MG tablet     Sig: Take 1/2-1 tablet PO QHS PRN for sleep. Please note that the lower the dose is more sedating     Dispense:  90 tablet     Refill:  0       Return in about 4 weeks (around 8/22/2024), or if symptoms worsen or fail to improve, for Recheck.     Treatment plan completed: 10/25/23    Progress toward goal: Not at goal    Functional Status: Mild impairment     Prognosis: Good with Ongoing Treatment       This patient will not be seen for the in person visits due  to the following exception(s): It would be a hardship for this patient to see a provider in person.    It is my professional opinion that the patient is clinically stable and an in person visit will risk worsening the patient's condition creating undue hardship.           This document has been electronically signed by TOÑO Anthony  July 25, 2024 15:09 EDT    Some of the data in this electronic note has been brought forward from a previous encounter, any necessary changes have been made, it has been reviewed by this APRN, and it is accurate.    Please note that portions of this note were completed with a voice recognition program. Efforts were made to edit dictation, but occasionally words are mistranscribed.

## 2024-07-29 ENCOUNTER — TELEPHONE (OUTPATIENT)
Dept: FAMILY MEDICINE CLINIC | Facility: CLINIC | Age: 89
End: 2024-07-29
Payer: MEDICARE

## 2024-07-29 NOTE — TELEPHONE ENCOUNTER
FYI:   Pt's son called in - said that Pt has been having issues with altered mental status, also has had foaming/drooling from mouth. Transferred to [place] for ER recommendation per Chiki Mcelroy

## 2024-07-31 ENCOUNTER — TELEPHONE (OUTPATIENT)
Dept: PSYCHIATRY | Facility: CLINIC | Age: 89
End: 2024-07-31
Payer: MEDICARE

## 2024-07-31 NOTE — TELEPHONE ENCOUNTER
Patients son called this morning stating the patient had been in the hospital this week and released, but was told to call and schedule a follow up with provider. Appointment has been scheduled and a authorization to obtain records was faxed to [facility] 07/31/24 at 3:04 pm.

## 2024-08-05 ENCOUNTER — OFFICE VISIT (OUTPATIENT)
Dept: FAMILY MEDICINE CLINIC | Facility: CLINIC | Age: 89
End: 2024-08-05
Payer: MEDICARE

## 2024-08-05 ENCOUNTER — TELEPHONE (OUTPATIENT)
Dept: PSYCHIATRY | Facility: CLINIC | Age: 89
End: 2024-08-05
Payer: MEDICARE

## 2024-08-05 VITALS
OXYGEN SATURATION: 97 % | HEIGHT: 63 IN | SYSTOLIC BLOOD PRESSURE: 138 MMHG | BODY MASS INDEX: 21.79 KG/M2 | HEART RATE: 88 BPM | DIASTOLIC BLOOD PRESSURE: 86 MMHG

## 2024-08-05 DIAGNOSIS — Z63.6 CAREGIVER STRESS: ICD-10-CM

## 2024-08-05 DIAGNOSIS — I95.1 ORTHOSTATIC HYPOTENSION: ICD-10-CM

## 2024-08-05 DIAGNOSIS — T43.205D: ICD-10-CM

## 2024-08-05 DIAGNOSIS — F41.9 ANXIETY: Primary | ICD-10-CM

## 2024-08-05 PROCEDURE — 1126F AMNT PAIN NOTED NONE PRSNT: CPT | Performed by: PHYSICIAN ASSISTANT

## 2024-08-05 PROCEDURE — 99214 OFFICE O/P EST MOD 30 MIN: CPT | Performed by: PHYSICIAN ASSISTANT

## 2024-08-05 SDOH — SOCIAL STABILITY - SOCIAL INSECURITY: DEPENDENT RELATIVE NEEDING CARE AT HOME: Z63.6

## 2024-08-05 NOTE — PROGRESS NOTES
.Chief Complaint  Hospital Follow Up Visit ([facility] [place] )    Subjective          History of Present Illness  Katie Vaughn is here today with her  History of Present Illness  The patient is an 88-year-old female who presents for evaluation of multiple medical concerns. She is accompanied by her son.    She experienced an episode of dizziness an hour after taking quetiapine at bedtime. Her therapist advised her to discontinue quetiapine for sleep and reintroduce mirtazapine, a medication that she had previously taken. She also takes buspirone for anxiety, split into 2.5 mg in the morning and 2.5 mg in the afternoon, and to help her sleep. Last night, she felt unwell after her morning dose of buspirone at 4:00 PM, which her son suspects may be due to anxiety. She fears the medication. Initially, a stroke was suspected due to spasms, heaviness in the leg, spasms in the left arm, left leg, side of her face, and tongue. She was seen in the ED and underwent 2 CT scans and an MRI, the neurologist found no significant findings. The APRN suggested a possible drug interaction and tardive dyskinesia, but she does not meet the criteria for admission. She continues to experience weakness and slight dizziness. She was feeling unwell yesterday, with days when she did not want to do anything. She is scheduled to see her therapist, Katy Reaves, in the morning.      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  The son and the family are considering options so that mother is not sole caregiver for her spouse anymore as it is causing her undue stress.  Son also notes that the ER doctor recommended a follow-up with a cardiologist, but they were concerned about her blood pressure changing from standing to sitting to lying down.  He states that the home health nurse checked it this morning, and her blood pressures were staying the same whether she was lying sitting or standing.  She remembers to drink 3  "bottles of water per day.    Objective   Vital Signs:   /86   Pulse 88   Ht 160 cm (63\")   SpO2 97%   BMI 21.79 kg/m²     Body mass index is 21.79 kg/m².  BMI is within normal parameters. No other follow-up for BMI required.      Review of Systems      Current Outpatient Medications:     busPIRone (BUSPAR) 5 MG tablet, Take 1/2 tablet PO QAM and every afternoon (Patient taking differently: Take 0.5 tablets by mouth Daily. Take 1/2 tablet PO QAM and every afternoon), Disp: 90 tablet, Rfl: 0    clotrimazole-betamethasone (LOTRISONE) 1-0.05 % cream, Apply 1 Application topically to the appropriate area as directed 2 (Two) Times a Day for 14 days., Disp: 30 g, Rfl: 0    dicyclomine (BENTYL) 10 MG capsule, 1 po 30 min before meals prn abd cramps, Disp: 90 capsule, Rfl: 2    metoprolol succinate XL (TOPROL-XL) 50 MG 24 hr tablet, Take 1 tablet by mouth Daily., Disp: 90 tablet, Rfl: 1    Synthroid 88 MCG tablet, Take 1 tablet by mouth Daily. Give name brand (Patient taking differently: Take 100 mcg by mouth Daily. Give name brand), Disp: 90 tablet, Rfl: 1    mirtazapine (REMERON) 15 MG tablet, Take 1/2-1 tablet PO QHS PRN for sleep. Please note that the lower the dose is more sedating (Patient not taking: Reported on 8/5/2024), Disp: 90 tablet, Rfl: 0    Allergies: Latex, Betadine [povidone iodine], Sulfa antibiotics, and Penicillins    Physical Exam  Vitals and nursing note reviewed.   Constitutional:       General: She is not in acute distress.     Appearance: Normal appearance. She is normal weight. She is not ill-appearing, toxic-appearing or diaphoretic.   HENT:      Head: Normocephalic and atraumatic.   Cardiovascular:      Rate and Rhythm: Normal rate and regular rhythm.      Heart sounds: Normal heart sounds.   Pulmonary:      Effort: Pulmonary effort is normal.      Breath sounds: Normal breath sounds.   Neurological:      General: No focal deficit present.      Mental Status: She is alert. "   Psychiatric:         Mood and Affect: Mood normal.         Behavior: Behavior normal.        Physical Exam      Result Review :          Results  Imaging  Two CT scans and an MRI were performed, both of which were normal.             Assessment and Plan    Diagnoses and all orders for this visit:    1. Anxiety (Primary)    2. Adverse effect of antidepressant drug, subsequent encounter  Follow-up with therapist tomorrow to further evaluate whether or not or which drugs they would like for her to restart.  3. Caregiver stress  Advised son and mother to contact her spouses primary care to see about different options available to help her out with caregiving  4. Orthostatic hypotension  Per her home health this morning resolved.  Did stressed that she does need to keep drinking a lot of water    Assessment & Plan        Follow Up   No follow-ups on file.  Patient was given instructions and counseling regarding her condition or for health maintenance advice. Please see specific information pulled into the AVS if appropriate.     Patient or patient representative verbalized consent for the use of Ambient Listening during the visit with  MARIA LUZ Ayers for chart documentation. 8/5/2024  16:55 EDT    MARIA LUZ Ayers  08/05/2024

## 2024-08-05 NOTE — TELEPHONE ENCOUNTER
Patient called office back, patient had left vm this morning prior to speak with a different . That  reached out to provider to make aware patient had been in the ED over the weekend and was requesting a sooner appointment. Provider ask patient to be booked for tomorrow 08/06/24. Patient was made aware of the appointment this morning and was reminded this afternoon.

## 2024-08-08 ENCOUNTER — TELEMEDICINE (OUTPATIENT)
Dept: PSYCHIATRY | Facility: CLINIC | Age: 89
End: 2024-08-08
Payer: MEDICARE

## 2024-08-08 DIAGNOSIS — F41.1 GENERALIZED ANXIETY DISORDER: Primary | Chronic | ICD-10-CM

## 2024-08-08 DIAGNOSIS — G47.9 SLEEP DIFFICULTIES: Chronic | ICD-10-CM

## 2024-08-08 RX ORDER — LEVOTHYROXINE SODIUM 100 MCG
100 TABLET ORAL
COMMUNITY
Start: 2024-07-30

## 2024-08-08 NOTE — PROGRESS NOTES
This provider is completing this appointment through [facility] (through Psych[facility]), [address], [facility], [address] using a secure 4momshart Video Visit through [employer]. Patient is being seen remotely via telehealth at their residence in [place], and stated they are in a secure environment for this session. The patient's condition being diagnosed/treated is appropriate for telemedicine. The provider identified herself as well as her credentials.   The patient, and/or patients guardian, consent to be seen remotely, and when consent is given they understand that the consent allows for patient identifiable information to be sent to a third party as needed.   They may refuse to be seen remotely at any time. The electronic data is encrypted and password protected, and the patient and/or guardian has been advised of the potential risks to privacy not withstanding such measures.    You have chosen to receive care through a telehealth visit.  Do you consent to use a video/audio connection for your medical care today? Yes    Patient identifiers utilized: Name and date of birth.        Subjective   Katie Vaughn is a 88 y.o. female who presents today for follow up    Chief Complaint:  Anxiety and sleep difficulties    Accompanied by: Pt's oldest son, Gabriel, is present during appointment with pt's verbal permission.    History of Present Illness:   *This APRN began seeing pt on 10/25/23. Pt was prescribed Klonopin 1 mg PO BID PRN by her PCP. However, the dose was too sedating and pt's family had been administering 0.5 mg PO BID instead. This APRN discussed with patient and her son that benzodiazepines are not generally recommended in the geriatric population as the longer half-lives can accumulate in the body and cause prolonged sedation. They can also cause memory loss and intellectual and cognitive impairments, such as anterograde amnesia, diminished short-term recall, and  increased forgetfulness. Benzodiazepines can also increase unsteadiness, disinhibition, psychomotor impairment leading to falls or MVA's, and possibly addiction. At that appointment, this APRN explained that if pt needs a benzodiazepine, they will need in-person services. Pt and her son were receptive to pt tapering off the benzodiazepine as they stated pt's PCP also did not want pt on it long-term. Pt was started on Lexapro 5 mg PO Daily at that time. Pt began to taper off the Klonopin and it was discontinued at pt's 12/6/23 appointment. Pt had difficulty sleeping and Remeron 7.5-15 mg PO QHS PRN was added as a sleep aid. Pt was doing well at her appointment on 1/3/24 and no changes were made. Pt's son called stating pt was experiencing night sweats, weakness, and jitteriness on 3/11/24. Pt was seen for an appointment on 3/20/24 and Remeron was discontinued at that time since it was the last medication added. Pt was advised to try OTC melatonin for a sleep aid and if that failed to try OTC magnesium glycinate. If pt found both to be ineffective, then Seroquel 12.5-25 mg was to be added to her medication regimen. The pt and son called on 3/26/24 stating she continues to have night sweats and the OTC medications were ineffective. Seroquel was started at that time. At pt's appointment on 4/3/24, it was decided to taper pt off Lexapro as this was thought to be the cause for the night sweats. Pt was then started on Buspar 2.5-5 mg PO BID. At pt's appointment on 5/1/24, pt stated she was not able to tolerate 5 mg of Buspar BID, therefore, she is taking 2.5 mg PO BID. No changes were made at that time. At pt's appointment on 6/27/24, pt stated she was having difficulty sleeping; pt's Seroquel was increased to 37.5 mg PO QHS since pt was previously doing well on it without side effects. Pt's son called on 7/22/24, stating pt was having dizziness and weakness. This APRN recommended pt decrease Seroquel back to 25 mg PO QHS  and monitor her BPs. Pt went to her PCP on 7/23/24 and was found to have a UTI; she was ordered an antibiotic. At pt's appointment on 7/25/24, this APRN instructed pt to discontinue Seroquel since pt felt this was contributing to her dizziness and weakness. Pt stated she is not able to sleep without an aid. This APRN advised pt to wait until her antibiotics were completed on the evening of 7/30/24 to start the Remeron 7.5-15 mg PO QHS PRN to give time for Seroquel to clear her system and prevent interactions between Remeron and the antibiotic. Pt's son told office staff that pt started the Remeron on 7/28/24 because she was having difficulty sleeping. Again, pt was prescribed Remeron from December 2023 through March 2024. The Remeron was only switched to Seroquel after pt was experiencing night sweats and the Remeron was originally thought to be the cause. It was reported to this APRN that pt was seen at [facility] on 7/29/24 for dizziness, sensation change, and malaise. Pt completed several diagnostic tests. An MRI scan of the brain showed no acute infarct and her neurological exam was intact. Per the neurologist, they suspect pt's recent complaints are secondary to her anti-anxiety medication changes, as she historically been sensitive to medication changes, and her age is likely exacerbating any adverse effects of these medications. At discharge, the hospital did not adjust or discontinue any of pt's psychiatric medications. Pt was scheduled with this APRN for a sooner appointment on 8/8/24. On 8/5/24, pt's son called the office stating pt was in the hospital again over the weekend for anxiety, palpitations, and dizziness. Pt's son reported the hospital discontinued the Remeron and the Buspar 2.5 mg in the morning. The hospital only kept pt on Buspar 2.5 mg in the evening.*    Today pt's son reports pt has been increasingly confused with moments of clarity. He states pt has developed a fear  at night time and is anxious to go to sleep. He reports she gets panicky. Pt is reported as having weakness and is utilizing a walker. Pt's son states pt is nervous and shaking at the time of appointment. Due to virtual limitations, this APRN is not able to witness this. It is reported pt has only been taking the Buspar 2.5 mg PO QAM since discharged from the ED this weekend. Advised son to give the Buspar 2.5 mg PO at night due to being more anxious at that time. Per son, after the ED discontinued the Remeron, pt slept well for a few days. However, the past two nights she has not slept well. Pt's son states his morning, pt called 911 because she felt she could not breathe, began vomiting, her abdomen was tight, and she felt she was having a MI. Per son, they checked her vitals, which were acceptable and pt did not want to go to the hospital. Pt's son reports that pt began having issues around July 20th when she was diagnosed with a UTI and began to decline afterward. He reports pt was having spasms in her left arm and leg and an intermittent facial tremor on 7/29/24, which is what prompted the first visit to [facility]. He was afraid she was having a CVA. Spasms and a tremor were never reported to this APRN until today. This APRN asked about any involuntary movements and/or tremors at every appointment while pt was prescribed Seroquel and pt always denied. Pt's son states the possibility of TD symptoms were brought up at the hospital. The Seroquel 37.5 mg was decreased to 25 mg on 7/22/24 and discontinued on 7/25/24. This APRN discussed with pt and her son that from a safety standpoint she does not feel comfortable adjusting pt's medication as the patient has been to the hospital x2 since her last appointment on 7/25/24. The neurologist at the first hospital visit felt the pt's complaints were related to changes in psychotropic medications. At the second hospital visit, the son reported the  provider concurred. Of note, this APRN instructed pt to wait approximately one week before starting Remeron to allow the Seroquel time to clear her system and to prevent any potential interactions between the Remeron and the antibiotic. Due to pt's sensitivity to medications and complaints of dizziness, vertigo, and others at small dosages of medications, a higher level of care is recommended with an in-person psychiatrist so they can safely monitor her better. This APRN will place an urgent referral and contact pt's PCP to inform him of situation. Until pt can get an appointment with an in-person psychiatrist, it would be in her best interest to follow up with PCP to monitor vitals and any other complaints. This APRN advised son to take pt to the ED if symptoms worsen and he verbalized understanding.       Prior Psychiatric Medications:  Klonopin  Zoloft - GI disturbance, took for a few days  Trazodone - ineffective at 25 mg  Ativan - did well on it in the past   Doxepin - 10-20 mg ineffective for sleep  Elavil  Melatonin - ineffective  Magnesium Glycinate - ineffective  Lexapro - night sweats  Remeron - effective, possible night sweats      The following portions of the patient's history were reviewed and updated as appropriate: allergies, current medications, past family history, past medical history, past social history, past surgical history and problem list.          Past Medical History:  Past Medical History:   Diagnosis Date    Anxiety     Cataract     Disease of thyroid gland     Diverticulitis     Ovarian cancer 2003    Positive TB test        Social History:  Social History     Socioeconomic History    Marital status:    Tobacco Use    Smoking status: Former     Current packs/day: 0.00     Average packs/day: 1 pack/day for 15.0 years (15.0 ttl pk-yrs)     Types: Cigarettes     Start date:      Quit date:      Years since quittin.6    Smokeless tobacco: Never   Vaping Use    Vaping  status: Never Used   Substance and Sexual Activity    Alcohol use: No    Drug use: No    Sexual activity: Defer       Family History:  Family History   Problem Relation Age of Onset    Lung cancer Mother     Colon cancer Paternal Aunt     Colon cancer Maternal Uncle     Depression Sister     Suicide Attempts Nephew     Drug abuse Nephew     OCD Grandson        Past Surgical History:  Past Surgical History:   Procedure Laterality Date    APPENDECTOMY      CHOLECYSTECTOMY      HYSTERECTOMY         Problem List:  Patient Active Problem List   Diagnosis    Diffuse large B-cell lymphoma of lymph nodes of multiple regions    Encounter for antineoplastic chemotherapy       Allergy:   Allergies   Allergen Reactions    Latex Itching, Dermatitis and Rash    Betadine [Povidone Iodine] Itching     Pt says her skin also blistered    Sulfa Antibiotics Swelling    Penicillins Rash        Current Medications:   Current Outpatient Medications   Medication Sig Dispense Refill    Synthroid 100 MCG tablet Take 1 tablet by mouth.      busPIRone (BUSPAR) 5 MG tablet Take 1/2 tablet PO QAM and every afternoon (Patient taking differently: Take 0.5 tablets by mouth Daily. Take 1/2 tablet PO QAM and every afternoon) 90 tablet 0    dicyclomine (BENTYL) 10 MG capsule 1 po 30 min before meals prn abd cramps 90 capsule 2    metoprolol succinate XL (TOPROL-XL) 50 MG 24 hr tablet Take 1 tablet by mouth Daily. 90 tablet 1     No current facility-administered medications for this visit.       Review of Symptoms:    Review of Systems   Constitutional:  Positive for appetite change and fatigue.   Neurological:  Positive for weakness and confusion.   Psychiatric/Behavioral:  Positive for sleep disturbance, depressed mood and stress. The patient is nervous/anxious.          Physical Exam:   Due to the remote nature of this encounter (virtual encounter), vitals were unable to be obtained.  Height stated at 63 inches.  Weight stated at 123  pounds.      Physical Exam  Neurological:      Mental Status: She is alert.   Psychiatric:         Attention and Perception: Attention and perception normal.         Mood and Affect: Affect normal. Mood is anxious.         Speech: Speech normal.         Behavior: Behavior is cooperative.         Thought Content: Thought content is not paranoid or delusional. Thought content does not include homicidal or suicidal ideation. Thought content does not include homicidal or suicidal plan.         Cognition and Memory: Cognition is impaired.      Comments: Pt is A&O x4, however, there is confusion evident           Mental Status Exam:   Hygiene:   good  Cooperation:  Cooperative  Eye Contact:  Good  Psychomotor Behavior:   Pt's son reports pt is nervous and shaking; this APRN is not able to witness due to virtual limitations  Affect:  Appropriate  Mood: anxious  Speech:  Normal  Thought Process:  Goal oriented with some confusion  Thought Content:  Mood congruent  Suicidal:  None  Homicidal:  None  Hallucinations:  None  Delusion:  None  Memory:  Deficits  Orientation:  Person, Place, Time, and Situation  Reliability:  poor to fair  Insight: poor to fair  Judgement:  poor to fair  Impulse Control:  Fair        Patient Health Questionnaire-9 (PHQ-9) (Depression Screening Tool)  Little interest or pleasure in doing things? (P) 3-->nearly every day   Feeling down, depressed, or hopeless? (P) 2-->more than half the days   Trouble falling or staying asleep, or sleeping too much? (P) 2-->more than half the days   Feeling tired or having little energy? (P) 3-->nearly every day   Poor appetite or overeating? (P) 1-->several days   Feeling bad about yourself - or that you are a failure or have let yourself or your family down? (P) 0-->not at all   Trouble concentrating on things, such as reading the newspaper or watching television? (P) 1-->several days   Moving or speaking so slowly that other people could have noticed? Or the  opposite - being so fidgety or restless that you have been moving around a lot more than usual? (P) 0-->not at all   Thoughts that you would be better off dead, or of hurting yourself in some way? (P) 0-->not at all   PHQ-9 Total Score (P) 12   If you checked off any problems, how difficult have these problems made it for you to do your work, take care of things at home, or get along with other people? (P) very difficult     PHQ-9 Total Score: (P) 12      Generalized Anxiety Disorder 7-Item (YASMINE-7) Screening Tool  Feeling nervous, anxious or on edge: (P) More than half the days  Not being able to stop or control worrying: (P) Several days  Worrying too much about different things: (P) Several days  Trouble Relaxing: (P) Several days  Being so restless that it is hard to sit still: (P) Not at all  Feeling afraid as if something awful might happen: (P) Not at all  Becoming easily annoyed or irritable: (P) Not at all  YASMINE 7 Total Score: (P) 5  If you checked any problems, how difficult have these problems made it for you to do your work, take care of things at home, or get along with other people: (P) Somewhat difficult      Previous Provider notes and available records reviewed by this APRN at today's encounter.         Lab Results:   Office Visit on 07/23/2024   Component Date Value Ref Range Status    Color 07/23/2024 Yellow  Yellow, Straw, Dark Yellow, Delfina Final    Clarity, UA 07/23/2024 Clear  Clear Final    Specific Gravity  07/23/2024 1.025  1.005 - 1.030 Final    pH, Urine 07/23/2024 5.5  5.0 - 8.0 Final    Leukocytes 07/23/2024 Small (1+) (A)  Negative Final    Nitrite, UA 07/23/2024 Negative  Negative Final    Protein, POC 07/23/2024 1+ (A)  Negative mg/dL Final    Glucose, UA 07/23/2024 Negative  Negative mg/dL Final    Ketones, UA 07/23/2024 Negative  Negative Final    Urobilinogen, UA 07/23/2024 0.2 E.U./dL  Normal, 0.2 E.U./dL Final    Bilirubin 07/23/2024 Negative  Negative Final    Blood, UA  07/23/2024 1+ (A)  Negative Final    Lot Number 07/23/2024 [id number]   Final    Expiration Date 07/23/2024 4/30/25   Final    Urine Culture 07/23/2024 Final report   Final    Result 1 07/23/2024 Comment   Final    Comment: Mixed urogenital dale  10,000-25,000 colony forming units per mL           Assessment & Plan   Problems Addressed this Visit    None  Visit Diagnoses       Generalized anxiety disorder  (Chronic)   -  Primary    Relevant Orders    Ambulatory Referral to Psychiatry    Sleep difficulties  (Chronic)       Relevant Orders    Ambulatory Referral to Psychiatry          Diagnoses         Codes Comments    Generalized anxiety disorder    -  Primary ICD-10-CM: F41.1  ICD-9-CM: 300.02     Sleep difficulties     ICD-10-CM: G47.9  ICD-9-CM: 780.50             Visit Diagnoses:    ICD-10-CM ICD-9-CM   1. Generalized anxiety disorder  F41.1 300.02   2. Sleep difficulties  G47.9 780.50         Rule out MDD     GOALS:  Short Term Goals: Patient will be compliant with medication, and patient will have no significant medication related side effects.  Patient will be engaged in psychotherapy as indicated.  Patient will report subjective improvement of symptoms.  Long term goals: To stabilize mood and treat/improve subjective symptoms, the patient will stay out of the hospital, the patient will be at an optimal level of functioning, and the patient will take all medications as prescribed.  The patient verbalized understanding and agreement with goals that were mutually set.      TREATMENT PLAN: Continue supportive psychotherapy efforts and medications as indicated.  Medication and treatment options, both pharmacological and non-pharmacological treatment options, discussed during today's visit, including any off label use of medication. Patient acknowledged and verbally consented with current treatment plan and was educated on the importance of compliance with treatment and follow-up appointments.      -Continue Buspar  2.5 mg PO QHS for anxiety   -This APRN discussed with pt and her son that from a safety standpoint she does not feel comfortable adjusting pt's medication as the patient has been to the hospital x2 since her last appointment on 7/25/24. The neurologist at the first hospital visit felt the pt's complaints were related to changes in psychotropic medications. At the second hospital visit, the son reported the provider concurred. Of note, this APRN instructed pt to wait approximately one week before starting Remeron to allow the Seroquel time to clear her system and to prevent any potential interactions between the Remeron and the antibiotic. Due to pt's sensitivity to medications and complaints of dizziness, vertigo, and others at small dosages of medications, a higher level of care is recommended with an in-person psychiatrist so they can safely monitor her better. This APRN will place an urgent referral and contact pt's PCP to inform him of situation. Until pt can get an appointment with an in-person psychiatrist, it would be in her best interest to follow up with PCP to monitor vitals and any other complaints.   -This APRN advised pt's son to take pt to the ED if symptoms worsen; he verbalized understanding.      MEDICATION ISSUES:  Discussed treatment plan and medication options of prescribed medication as well as the risks, benefits, any black box warnings, and side effects including potential falls, possible impaired driving, and metabolic adversities among others, including any off label use of medication. Patient is agreeable to call the office with any worsening of symptoms or onset of side effects, or if any concerns or questions arise. The contact information for the office is made available to the patient. Patient is agreeable to call 911 or go to the nearest ER should they begin having any SI/HI, or if any urgent concerns arise.       VERBAL INFORMED CONSENT FOR MEDICATION:  The patient was educated that  their proposed/prescribed psychotropic medication(s) has potential risks, side effects, adverse effects, and black box warnings; and these have been discussed with the patient.  The patient has been informed that their treatment and medication dosage is to be individualized, and may even be above or below the recommended range/dosage due to patient individualization and response, but medication is prescribed using a shared decision making approach, and no medication or dosage will be prescribed without the patient's verbal consent.  The reason for the use of the medication including any off label use and alternative modes of treatment other than or in addition to medication has been considered and discussed, the probable consequences of not receiving the proposed treatment have been discussed, and any treatment side effects, black box warnings, and cautions associated with treatment have been discussed with the patient.  The patient is allowed ample time to openly discuss and ask questions regarding the proposed medication(s) and treatment plan and the patient verbalizes understanding the reasons for the use of the medication, its potential risks and benefits, other alternative treatment(s), and the probable consequences that may occur if the proposed medication is not given.  The patient has been given ample time to ask questions and study the information and find the information to be specific, accurate, and complete.  The patient gives verbal consent for the medication(s) proposed/prescribed, they verbalized understanding that they can refuse and withdraw consent at any time with the assistance of this APRN, and the patient has verbally confirmed that they are aware, and are willing, to take the prescribed medication and follow the treatment plan with the known possible risks, side effect, black box warnings, and any potential medication interactions, and the patient reports they will be worse off without this  medication and treatment plan.  The patient is advised to contact this APRN/this office if any questions or concerns arise at any time (at [phone]), or call 911/go to the closest emergency department if needed or outside of office hours.      SUICIDE RISK ASSESSMENT AND SAFETY PLAN: Unalterable demographics and a history of mental health intervention indicate this patient is in a high risk category compared to the general population. At present, the patient denies active SI/HI, intentions, or plans at this time and agrees to seek immediate care should such thoughts develop. The patient verbalizes understanding of how to access emergency care if needed and agrees to do so. Consideration of suicide risk and protective factors such as history, current presentation, individual strengths and weaknesses, psychosocial and environmental stressors and variables, psychiatric illness and symptoms, medical conditions and pain, took place in this interview. Based on those considerations, the patient is determined: within individual baseline and presenting no imminent risk for suicide or homicide. Other recommendations: The patient does not meet the criteria for inpatient admission and is not a safety risk to self or others at today's visit. Inpatient treatment offers no significant advantages over outpatient treatment for this patient at today's visit.  The patient was given ample time for questions and fully participated in treatment planning. The patient was encouraged to call the clinic with any questions or concerns.  The patient was informed of access to emergency care. If patient were to develop any significant symptomatology, suicidal ideation, homicidal ideation, any concerns, or feel unsafe at any time they are to call the clinic and if unable to get immediate assistance should immediately call 911 or go to the nearest emergency room.  Patient contracted verbally for the following: If you are experiencing an  emotional crisis or have thoughts of harming yourself or others, please go to your nearest local emergency room or call 911. Will continue to re-assess medication response and side effects frequently to establish efficacy and ensure safety. Risks, any black box warnings, side effects, off label usage, and benefits of medication and treatment discussed with patient, along with potential adverse side effects of current and/or newly prescribed medication, alternative treatment options, and OTC medications.  Patient verbalized understanding of potential risks, any off label use of medication, any black box warnings, and any side effects in their own words. The patient verbalized understanding and agreed to comply with the safety plan discussed in their own words.  Patient given the number to the office. Number also discussed of the 24- hour suicide hotline.       MEDS ORDERED DURING VISIT:  No orders of the defined types were placed in this encounter.       Treatment plan completed: 10/25/23    Progress toward goal: Not at goal    Functional Status: Severe impairment    Prognosis: Fair with Ongoing Treatment       This patient will not be seen for the in person visits due to the following exception(s): It would be a hardship for this patient to see a provider in person.    It is my professional opinion that the patient is clinically stable and an in person visit will risk worsening the patient's condition creating undue hardship.           This document has been electronically signed by TOÑO Anthony  August 8, 2024 14:40 EDT    Visit Time (face to face direct patient care):  In/Start Time: 12:46 PM.  Out/Stop: 1:43 PM.  57 minutes of face to face direct patient care with the patient spent in coordination of care and counseling the patient regarding diagnoses and treatment planning. Answered any questions patient had with medication and treatment plan.       Total Time spent by this TOÑO for today's encounter:  120  total minutes were spent by this APRN on charting/documentation, review of past medical records, direct face to face patient care, coordination of care    Some of the data in this electronic note has been brought forward from a previous encounter, any necessary changes have been made, it has been reviewed by this APRN, and it is accurate.    Please note that portions of this note were completed with a voice recognition program. Efforts were made to edit dictation, but occasionally words are mistranscribed.

## 2024-08-08 NOTE — LETTER
August 8, 2024     Dmitry Nicole MD  [address] [facility] [address]    Patient: Katie Vaughn   YOB: 1935   Date of Visit: 8/8/2024     Dear Dmitry Nicole MD:     I want to keep you updated on this mutual patient. I did not want you or any other provider to search through each of my notes to see her course of treatment. Therefore, I summarized it prior to the information from today's appointment. I have sent an urgent referral to an in-person psychiatrist at the [facility] office for Dr. Delfina Grier. However, I am not sure how quickly they can get her an appointment. I am not familiar with [facility], are there any psychiatrists in the area that she may get into see sooner?    Katy Ball APRN        CC: No Recipients    Katy Reaves APRN  08/08/24 1445  Sign when Signing Visit  This provider is completing this appointment through [facility]through [facility]), [address], [address] using a secure [employer]hart Video Visit through [facility]. Patient is being seen remotely via telehealth at their residence in [place], and stated they are in a secure environment for this session. The patient's condition being diagnosed/treated is appropriate for telemedicine. The provider identified herself as well as her credentials.   The patient, and/or patients guardian, consent to be seen remotely, and when consent is given they understand that the consent allows for patient identifiable information to be sent to a third party as needed.   They may refuse to be seen remotely at any time. The electronic data is encrypted and password protected, and the patient and/or guardian has been advised of the potential risks to privacy not withstanding such measures.    You have chosen to receive care through a telehealth visit.  Do you consent to use a video/audio connection for your medical care today? Yes    Patient identifiers utilized:  Name and date of birth.        Subjective  Katie Vaughn is a 88 y.o. female who presents today for follow up    Chief Complaint:  Anxiety and sleep difficulties    Accompanied by: Pt's oldest son, Gabriel, is present during appointment with pt's verbal permission.    History of Present Illness:   *This APRN began seeing pt on 10/25/23. Pt was prescribed Klonopin 1 mg PO BID PRN by her PCP. However, the dose was too sedating and pt's family had been administering 0.5 mg PO BID instead. This APRN discussed with patient and her son that benzodiazepines are not generally recommended in the geriatric population as the longer half-lives can accumulate in the body and cause prolonged sedation. They can also cause memory loss and intellectual and cognitive impairments, such as anterograde amnesia, diminished short-term recall, and increased forgetfulness. Benzodiazepines can also increase unsteadiness, disinhibition, psychomotor impairment leading to falls or MVA's, and possibly addiction. At that appointment, this APRN explained that if pt needs a benzodiazepine, they will need in-person services. Pt and her son were receptive to pt tapering off the benzodiazepine as they stated pt's PCP also did not want pt on it long-term. Pt was started on Lexapro 5 mg PO Daily at that time. Pt began to taper off the Klonopin and it was discontinued at pt's 12/6/23 appointment. Pt had difficulty sleeping and Remeron 7.5-15 mg PO QHS PRN was added as a sleep aid. Pt was doing well at her appointment on 1/3/24 and no changes were made. Pt's son called stating pt was experiencing night sweats, weakness, and jitteriness on 3/11/24. Pt was seen for an appointment on 3/20/24 and Remeron was discontinued at that time since it was the last medication added. Pt was advised to try OTC melatonin for a sleep aid and if that failed to try OTC magnesium glycinate. If pt found both to be ineffective, then Seroquel 12.5-25 mg was to be added to her  medication regimen. The pt and son called on 3/26/24 stating she continues to have night sweats and the OTC medications were ineffective. Seroquel was started at that time. At pt's appointment on 4/3/24, it was decided to taper pt off Lexapro as this was thought to be the cause for the night sweats. Pt was then started on Buspar 2.5-5 mg PO BID. At pt's appointment on 5/1/24, pt stated she was not able to tolerate 5 mg of Buspar BID, therefore, she is taking 2.5 mg PO BID. No changes were made at that time. At pt's appointment on 6/27/24, pt stated she was having difficulty sleeping; pt's Seroquel was increased to 37.5 mg PO QHS since pt was previously doing well on it without side effects. Pt's son called on 7/22/24, stating pt was having dizziness and weakness. This APRN recommended pt decrease Seroquel back to 25 mg PO QHS and monitor her BPs. Pt went to her PCP on 7/23/24 and was found to have a UTI; she was ordered an antibiotic. At pt's appointment on 7/25/24, this APRN instructed pt to discontinue Seroquel since pt felt this was contributing to her dizziness and weakness. Pt stated she is not able to sleep without an aid. This APRN advised pt to wait until her antibiotics were completed on the evening of 7/30/24 to start the Remeron 7.5-15 mg PO QHS PRN to give time for Seroquel to clear her system and prevent interactions between Remeron and the antibiotic. Pt's son told office staff that pt started the Remeron on 7/28/24 because she was having difficulty sleeping. Again, pt was prescribed Remeron from December 2023 through March 2024. The Remeron was only switched to Seroquel after pt was experiencing night sweats and the Remeron was originally thought to be the cause. It was reported to this APRN that pt was seen at [facility] on 7/29/24 for dizziness, sensation change, and malaise. Pt completed several diagnostic tests. An MRI scan of the brain showed no acute infarct and her  neurological exam was intact. Per the neurologist, they suspect pt's recent complaints are secondary to her anti-anxiety medication changes, as she historically been sensitive to medication changes, and her age is likely exacerbating any adverse effects of these medications. At discharge, the hospital did not adjust or discontinue any of pt's psychiatric medications. Pt was scheduled with this APRN for a sooner appointment on 8/8/24. On 8/5/24, pt's son called the office stating pt was in the hospital again over the weekend for anxiety, palpitations, and dizziness. Pt's son reported the hospital discontinued the Remeron and the Buspar 2.5 mg in the morning. The hospital only kept pt on Buspar 2.5 mg in the evening.*    Today pt's son reports pt has been increasingly confused with moments of clarity. He states pt has developed a fear at night time and is anxious to go to sleep. He reports she gets panicky. Pt is reported as having weakness and is utilizing a walker. Pt's son states pt is nervous and shaking at the time of appointment. Due to virtual limitations, this APRN is not able to witness this. It is reported pt has only been taking the Buspar 2.5 mg PO QAM since discharged from the ED this weekend. Advised son to give the Buspar 2.5 mg PO at night due to being more anxious at that time. Per son, after the ED discontinued the Remeron, pt slept well for a few days. However, the past two nights she has not slept well. Pt's son states his morning, pt called 911 because she felt she could not breathe, began vomiting, her abdomen was tight, and she felt she was having a MI. Per son, they checked her vitals, which were acceptable and pt did not want to go to the hospital. Pt's son reports that pt began having issues around July 20th when she was diagnosed with a UTI and began to decline afterward. He reports pt was having spasms in her left arm and leg and an intermittent facial tremor on 7/29/24, which is what  prompted the first visit to [facility]. He was afraid she was having a CVA. Spasms and a tremor were never reported to this APRN until today. This APRN asked about any involuntary movements and/or tremors at every appointment while pt was prescribed Seroquel and pt always denied. Pt's son states the possibility of TD symptoms were brought up at the hospital. The Seroquel 37.5 mg was decreased to 25 mg on 7/22/24 and discontinued on 7/25/24. This APRN discussed with pt and her son that from a safety standpoint she does not feel comfortable adjusting pt's medication as the patient has been to the hospital x2 since her last appointment on 7/25/24. The neurologist at the first hospital visit felt the pt's complaints were related to changes in psychotropic medications. At the second hospital visit, the son reported the provider concurred. Of note, this APRN instructed pt to wait approximately one week before starting Remeron to allow the Seroquel time to clear her system and to prevent any potential interactions between the Remeron and the antibiotic. Due to pt's sensitivity to medications and complaints of dizziness, vertigo, and others at small dosages of medications, a higher level of care is recommended with an in-person psychiatrist so they can safely monitor her better. This APRN will place an urgent referral and contact pt's PCP to inform him of situation. Until pt can get an appointment with an in-person psychiatrist, it would be in her best interest to follow up with PCP to monitor vitals and any other complaints. This APRN advised son to take pt to the ED if symptoms worsen and he verbalized understanding.       Prior Psychiatric Medications:  Klonopin  Zoloft - GI disturbance, took for a few days  Trazodone - ineffective at 25 mg  Ativan - did well on it in the past   Doxepin - 10-20 mg ineffective for sleep  Elavil  Melatonin - ineffective  Magnesium Glycinate - ineffective  Lexapro -  night sweats  Remeron - effective, possible night sweats      The following portions of the patient's history were reviewed and updated as appropriate: allergies, current medications, past family history, past medical history, past social history, past surgical history and problem list.          Past Medical History:  Past Medical History:   Diagnosis Date   • Anxiety    • Cataract    • Disease of thyroid gland    • Diverticulitis    • Ovarian cancer    • Positive TB test        Social History:  Social History     Socioeconomic History   • Marital status:    Tobacco Use   • Smoking status: Former     Current packs/day: 0.00     Average packs/day: 1 pack/day for 15.0 years (15.0 ttl pk-yrs)     Types: Cigarettes     Start date:      Quit date:      Years since quittin.6   • Smokeless tobacco: Never   Vaping Use   • Vaping status: Never Used   Substance and Sexual Activity   • Alcohol use: No   • Drug use: No   • Sexual activity: Defer       Family History:  Family History   Problem Relation Age of Onset   • Lung cancer Mother    • Colon cancer Paternal Aunt    • Colon cancer Maternal Uncle    • Depression Sister    • Suicide Attempts Nephew    • Drug abuse Nephew    • OCD Grandson        Past Surgical History:  Past Surgical History:   Procedure Laterality Date   • APPENDECTOMY     • CHOLECYSTECTOMY     • HYSTERECTOMY         Problem List:  Patient Active Problem List   Diagnosis   • Diffuse large B-cell lymphoma of lymph nodes of multiple regions   • Encounter for antineoplastic chemotherapy       Allergy:   Allergies   Allergen Reactions   • Latex Itching, Dermatitis and Rash   • Betadine [Povidone Iodine] Itching     Pt says her skin also blistered   • Sulfa Antibiotics Swelling   • Penicillins Rash        Current Medications:   Current Outpatient Medications   Medication Sig Dispense Refill   • Synthroid 100 MCG tablet Take 1 tablet by mouth.     • busPIRone (BUSPAR) 5 MG tablet Take 1/2  tablet PO QAM and every afternoon (Patient taking differently: Take 0.5 tablets by mouth Daily. Take 1/2 tablet PO QAM and every afternoon) 90 tablet 0   • dicyclomine (BENTYL) 10 MG capsule 1 po 30 min before meals prn abd cramps 90 capsule 2   • metoprolol succinate XL (TOPROL-XL) 50 MG 24 hr tablet Take 1 tablet by mouth Daily. 90 tablet 1     No current facility-administered medications for this visit.       Review of Symptoms:    Review of Systems   Constitutional:  Positive for appetite change and fatigue.   Neurological:  Positive for weakness and confusion.   Psychiatric/Behavioral:  Positive for sleep disturbance, depressed mood and stress. The patient is nervous/anxious.          Physical Exam:   Due to the remote nature of this encounter (virtual encounter), vitals were unable to be obtained.  Height stated at 63 inches.  Weight stated at 123 pounds.      Physical Exam  Neurological:      Mental Status: She is alert.   Psychiatric:         Attention and Perception: Attention and perception normal.         Mood and Affect: Affect normal. Mood is anxious.         Speech: Speech normal.         Behavior: Behavior is cooperative.         Thought Content: Thought content is not paranoid or delusional. Thought content does not include homicidal or suicidal ideation. Thought content does not include homicidal or suicidal plan.         Cognition and Memory: Cognition is impaired.      Comments: Pt is A&O x4, however, there is confusion evident           Mental Status Exam:   Hygiene:   good  Cooperation:  Cooperative  Eye Contact:  Good  Psychomotor Behavior:   Pt's son reports pt is nervous and shaking; this APRN is not able to witness due to virtual limitations  Affect:  Appropriate  Mood: anxious  Speech:  Normal  Thought Process:  Goal oriented with some confusion  Thought Content:  Mood congruent  Suicidal:  None  Homicidal:  None  Hallucinations:  None  Delusion:  None  Memory:  Deficits  Orientation:   Person, Place, Time, and Situation  Reliability:  poor to fair  Insight: poor to fair  Judgement:  poor to fair  Impulse Control:  Fair        Patient Health Questionnaire-9 (PHQ-9) (Depression Screening Tool)  Little interest or pleasure in doing things? (P) 3-->nearly every day   Feeling down, depressed, or hopeless? (P) 2-->more than half the days   Trouble falling or staying asleep, or sleeping too much? (P) 2-->more than half the days   Feeling tired or having little energy? (P) 3-->nearly every day   Poor appetite or overeating? (P) 1-->several days   Feeling bad about yourself - or that you are a failure or have let yourself or your family down? (P) 0-->not at all   Trouble concentrating on things, such as reading the newspaper or watching television? (P) 1-->several days   Moving or speaking so slowly that other people could have noticed? Or the opposite - being so fidgety or restless that you have been moving around a lot more than usual? (P) 0-->not at all   Thoughts that you would be better off dead, or of hurting yourself in some way? (P) 0-->not at all   PHQ-9 Total Score (P) 12   If you checked off any problems, how difficult have these problems made it for you to do your work, take care of things at home, or get along with other people? (P) very difficult     PHQ-9 Total Score: (P) 12      Generalized Anxiety Disorder 7-Item (YASMINE-7) Screening Tool  Feeling nervous, anxious or on edge: (P) More than half the days  Not being able to stop or control worrying: (P) Several days  Worrying too much about different things: (P) Several days  Trouble Relaxing: (P) Several days  Being so restless that it is hard to sit still: (P) Not at all  Feeling afraid as if something awful might happen: (P) Not at all  Becoming easily annoyed or irritable: (P) Not at all  YASMINE 7 Total Score: (P) 5  If you checked any problems, how difficult have these problems made it for you to do your work, take care of things at home, or  get along with other people: (P) Somewhat difficult      Previous Provider notes and available records reviewed by this APRN at today's encounter.         Lab Results:   Office Visit on 07/23/2024   Component Date Value Ref Range Status   • Color 07/23/2024 Yellow  Yellow, Straw, Dark Yellow, Delfina Final   • Clarity, UA 07/23/2024 Clear  Clear Final   • Specific Gravity  07/23/2024 1.025  1.005 - 1.030 Final   • pH, Urine 07/23/2024 5.5  5.0 - 8.0 Final   • Leukocytes 07/23/2024 Small (1+) (A)  Negative Final   • Nitrite, UA 07/23/2024 Negative  Negative Final   • Protein, POC 07/23/2024 1+ (A)  Negative mg/dL Final   • Glucose, UA 07/23/2024 Negative  Negative mg/dL Final   • Ketones, UA 07/23/2024 Negative  Negative Final   • Urobilinogen, UA 07/23/2024 0.2 E.U./dL  Normal, 0.2 E.U./dL Final   • Bilirubin 07/23/2024 Negative  Negative Final   • Blood, UA 07/23/2024 1+ (A)  Negative Final   • Lot Number 07/23/2024 [id number]   Final   • Expiration Date 07/23/2024 4/30/25   Final   • Urine Culture 07/23/2024 Final report   Final   • Result 1 07/23/2024 Comment   Final    Comment: Mixed urogenital dale  10,000-25,000 colony forming units per mL           Assessment & Plan  Problems Addressed this Visit    None  Visit Diagnoses       Generalized anxiety disorder  (Chronic)   -  Primary    Relevant Orders    Ambulatory Referral to Psychiatry    Sleep difficulties  (Chronic)       Relevant Orders    Ambulatory Referral to Psychiatry          Diagnoses         Codes Comments    Generalized anxiety disorder    -  Primary ICD-10-CM: F41.1  ICD-9-CM: 300.02     Sleep difficulties     ICD-10-CM: G47.9  ICD-9-CM: 780.50             Visit Diagnoses:    ICD-10-CM ICD-9-CM   1. Generalized anxiety disorder  F41.1 300.02   2. Sleep difficulties  G47.9 780.50         Rule out MDD     GOALS:  Short Term Goals: Patient will be compliant with medication, and patient will have no significant medication related side effects.  Patient  will be engaged in psychotherapy as indicated.  Patient will report subjective improvement of symptoms.  Long term goals: To stabilize mood and treat/improve subjective symptoms, the patient will stay out of the hospital, the patient will be at an optimal level of functioning, and the patient will take all medications as prescribed.  The patient verbalized understanding and agreement with goals that were mutually set.      TREATMENT PLAN: Continue supportive psychotherapy efforts and medications as indicated.  Medication and treatment options, both pharmacological and non-pharmacological treatment options, discussed during today's visit, including any off label use of medication. Patient acknowledged and verbally consented with current treatment plan and was educated on the importance of compliance with treatment and follow-up appointments.      -Continue Buspar 2.5 mg PO QHS for anxiety   -This APRN discussed with pt and her son that from a safety standpoint she does not feel comfortable adjusting pt's medication as the patient has been to the hospital x2 since her last appointment on 7/25/24. The neurologist at the first hospital visit felt the pt's complaints were related to changes in psychotropic medications. At the second hospital visit, the son reported the provider concurred. Of note, this APRN instructed pt to wait approximately one week before starting Remeron to allow the Seroquel time to clear her system and to prevent any potential interactions between the Remeron and the antibiotic. Due to pt's sensitivity to medications and complaints of dizziness, vertigo, and others at small dosages of medications, a higher level of care is recommended with an in-person psychiatrist so they can safely monitor her better. This APRN will place an urgent referral and contact pt's PCP to inform him of situation. Until pt can get an appointment with an in-person psychiatrist, it would be in her best interest to follow up  with PCP to monitor vitals and any other complaints.   -This APRN advised pt's son to take pt to the ED if symptoms worsen; he verbalized understanding.      MEDICATION ISSUES:  Discussed treatment plan and medication options of prescribed medication as well as the risks, benefits, any black box warnings, and side effects including potential falls, possible impaired driving, and metabolic adversities among others, including any off label use of medication. Patient is agreeable to call the office with any worsening of symptoms or onset of side effects, or if any concerns or questions arise. The contact information for the office is made available to the patient. Patient is agreeable to call 911 or go to the nearest ER should they begin having any SI/HI, or if any urgent concerns arise.       VERBAL INFORMED CONSENT FOR MEDICATION:  The patient was educated that their proposed/prescribed psychotropic medication(s) has potential risks, side effects, adverse effects, and black box warnings; and these have been discussed with the patient.  The patient has been informed that their treatment and medication dosage is to be individualized, and may even be above or below the recommended range/dosage due to patient individualization and response, but medication is prescribed using a shared decision making approach, and no medication or dosage will be prescribed without the patient's verbal consent.  The reason for the use of the medication including any off label use and alternative modes of treatment other than or in addition to medication has been considered and discussed, the probable consequences of not receiving the proposed treatment have been discussed, and any treatment side effects, black box warnings, and cautions associated with treatment have been discussed with the patient.  The patient is allowed ample time to openly discuss and ask questions regarding the proposed medication(s) and treatment plan and the patient  verbalizes understanding the reasons for the use of the medication, its potential risks and benefits, other alternative treatment(s), and the probable consequences that may occur if the proposed medication is not given.  The patient has been given ample time to ask questions and study the information and find the information to be specific, accurate, and complete.  The patient gives verbal consent for the medication(s) proposed/prescribed, they verbalized understanding that they can refuse and withdraw consent at any time with the assistance of this APRN, and the patient has verbally confirmed that they are aware, and are willing, to take the prescribed medication and follow the treatment plan with the known possible risks, side effect, black box warnings, and any potential medication interactions, and the patient reports they will be worse off without this medication and treatment plan.  The patient is advised to contact this APRN/this office if any questions or concerns arise at any time (at [phone]), or call 911/go to the closest emergency department if needed or outside of office hours.      SUICIDE RISK ASSESSMENT AND SAFETY PLAN: Unalterable demographics and a history of mental health intervention indicate this patient is in a high risk category compared to the general population. At present, the patient denies active SI/HI, intentions, or plans at this time and agrees to seek immediate care should such thoughts develop. The patient verbalizes understanding of how to access emergency care if needed and agrees to do so. Consideration of suicide risk and protective factors such as history, current presentation, individual strengths and weaknesses, psychosocial and environmental stressors and variables, psychiatric illness and symptoms, medical conditions and pain, took place in this interview. Based on those considerations, the patient is determined: within individual baseline and presenting no imminent  risk for suicide or homicide. Other recommendations: The patient does not meet the criteria for inpatient admission and is not a safety risk to self or others at today's visit. Inpatient treatment offers no significant advantages over outpatient treatment for this patient at today's visit.  The patient was given ample time for questions and fully participated in treatment planning. The patient was encouraged to call the clinic with any questions or concerns.  The patient was informed of access to emergency care. If patient were to develop any significant symptomatology, suicidal ideation, homicidal ideation, any concerns, or feel unsafe at any time they are to call the clinic and if unable to get immediate assistance should immediately call 911 or go to the nearest emergency room.  Patient contracted verbally for the following: If you are experiencing an emotional crisis or have thoughts of harming yourself or others, please go to your nearest local emergency room or call 911. Will continue to re-assess medication response and side effects frequently to establish efficacy and ensure safety. Risks, any black box warnings, side effects, off label usage, and benefits of medication and treatment discussed with patient, along with potential adverse side effects of current and/or newly prescribed medication, alternative treatment options, and OTC medications.  Patient verbalized understanding of potential risks, any off label use of medication, any black box warnings, and any side effects in their own words. The patient verbalized understanding and agreed to comply with the safety plan discussed in their own words.  Patient given the number to the office. Number also discussed of the 24- hour suicide hotline.       MEDS ORDERED DURING VISIT:  No orders of the defined types were placed in this encounter.       Treatment plan completed: 10/25/23    Progress toward goal: Not at goal    Functional Status: Severe  impairment    Prognosis: Fair with Ongoing Treatment       This patient will not be seen for the in person visits due to the following exception(s): It would be a hardship for this patient to see a provider in person.    It is my professional opinion that the patient is clinically stable and an in person visit will risk worsening the patient's condition creating undue hardship.           This document has been electronically signed by TOÑO Anthony  August 8, 2024 14:40 EDT    Visit Time (face to face direct patient care):  In/Start Time: 12:46 PM.  Out/Stop: 1:43 PM.  57 minutes of face to face direct patient care with the patient spent in coordination of care and counseling the patient regarding diagnoses and treatment planning. Answered any questions patient had with medication and treatment plan.       Total Time spent by this APRN for today's encounter:  120 total minutes were spent by this APRN on charting/documentation, review of past medical records, direct face to face patient care, coordination of care    Some of the data in this electronic note has been brought forward from a previous encounter, any necessary changes have been made, it has been reviewed by this APRN, and it is accurate.    Please note that portions of this note were completed with a voice recognition program. Efforts were made to edit dictation, but occasionally words are mistranscribed.

## 2024-08-10 ENCOUNTER — PATIENT MESSAGE (OUTPATIENT)
Dept: FAMILY MEDICINE CLINIC | Facility: CLINIC | Age: 89
End: 2024-08-10
Payer: MEDICARE

## 2024-08-10 DIAGNOSIS — T43.205D: ICD-10-CM

## 2024-08-10 DIAGNOSIS — Z63.6 CAREGIVER STRESS: ICD-10-CM

## 2024-08-10 DIAGNOSIS — F41.9 ANXIETY: Primary | ICD-10-CM

## 2024-08-10 SDOH — SOCIAL STABILITY - SOCIAL INSECURITY: DEPENDENT RELATIVE NEEDING CARE AT HOME: Z63.6

## 2024-08-13 ENCOUNTER — OFFICE VISIT (OUTPATIENT)
Dept: FAMILY MEDICINE CLINIC | Facility: CLINIC | Age: 89
End: 2024-08-13
Payer: MEDICARE

## 2024-08-13 VITALS
HEART RATE: 103 BPM | BODY MASS INDEX: 21.26 KG/M2 | SYSTOLIC BLOOD PRESSURE: 158 MMHG | OXYGEN SATURATION: 98 % | DIASTOLIC BLOOD PRESSURE: 98 MMHG | WEIGHT: 120 LBS

## 2024-08-13 DIAGNOSIS — F29 PSYCHOSIS, UNSPECIFIED PSYCHOSIS TYPE: ICD-10-CM

## 2024-08-13 DIAGNOSIS — Z63.6 CAREGIVER STRESS: ICD-10-CM

## 2024-08-13 DIAGNOSIS — F41.9 ANXIETY: Primary | ICD-10-CM

## 2024-08-13 RX ORDER — LORAZEPAM 1 MG/1
TABLET ORAL
Qty: 90 TABLET | Refills: 0 | Status: SHIPPED | OUTPATIENT
Start: 2024-08-13

## 2024-08-13 SDOH — SOCIAL STABILITY - SOCIAL INSECURITY: DEPENDENT RELATIVE NEEDING CARE AT HOME: Z63.6

## 2024-08-13 NOTE — PROGRESS NOTES
Follow Up Office Visit      Date of Visit:  2024   Patient Name: Katie Vaughn  : 1935   MRN: [id number]     Chief Complaint:    Chief Complaint   Patient presents with    Anxiety       History of Present Illness: Katie Vaughn is a 88 y.o. female who is here today for follow up.    History of Present Illness  The patient is an 88-year-old female brought in today by her son. We are following up on issues related to her anxiety and mental health. She is accompanied by her son.    She was referred here by a psychiatric nurse practitioner to discuss her urgent symptoms and to discuss a higher level of care for her anxiety and mental health issues.      Her symptoms have worsened recently, with increased detachment and restlessness. She expressed a desire to get up yesterday but was unsure of her surroundings. She has made random comments and has called 911 on several occasions. Her symptoms began after hospital discharge, including dizziness, generalized weakness, and muscle spasms in her hands and legs.  Thorough workup has been done through the ER.  She has had blood work urine cultures and MRI.  Her son and his brother are overwhelmed. She is currently taking buspirone 2.55 mg at bedtime.  On 2024, she reported feeling unwell and dizzyand her son noticed tingling in her tongue, which raised concerns about a stroke. An MRI ruled out a stroke. A neurologist, Dr. Nam, reviewed the scans, and informed him that her brain was fine for her age and that it was nothing to be concerned about. He advised her to monitor her blood pressure. Her son wonders if a panic attack was causing her symptoms. She has difficulty remembering events and registers. She had a nervous breakdown last year, before starting doxepin, and was then switched to citalopram, temazepam, Remeron, and Seroquel. She was given short-term Klonopin in 10/2023 or 2023. She has experienced side effects of all of these medications,  including night sweats and poor sleep.  Her son has noticed memory issues in the last 2 to 3 years, but nothing significant. She was able to dress herself this morning, but needed assistance. Her son reports that she first had a psychotic episode in , which took a few weeks and months to recover. She had already been on lorazepam for 15 years previously. Her son reports that lorazepam helped her calm down in the past. Her son reports that he helped her shower last night. Her son reports that she has been eating well for the last couple of days, but son has to coax her into it.  Her son reports that she is frustrated as she can not get her thoughts and think straight. She wants to get up and do things in the house like she always does, but she can not maintain.       Subjective      Review of Systems:   Review of Systems   Neurological:  Positive for memory problem and confusion.   Psychiatric/Behavioral:  Positive for agitation and stress. The patient is nervous/anxious.        Past Medical History:   Past Medical History:   Diagnosis Date    Anxiety     Cataract     Disease of thyroid gland     Diverticulitis     Ovarian cancer 2003    Positive TB test        Past Surgical History:   Past Surgical History:   Procedure Laterality Date    APPENDECTOMY      CHOLECYSTECTOMY      HYSTERECTOMY         Family History:   Family History   Problem Relation Age of Onset    Lung cancer Mother     Colon cancer Paternal Aunt     Colon cancer Maternal Uncle     Depression Sister     Suicide Attempts Nephew     Drug abuse Nephew     OCD Grandson        Social History:   Social History     Socioeconomic History    Marital status:    Tobacco Use    Smoking status: Former     Current packs/day: 0.00     Average packs/day: 1 pack/day for 15.0 years (15.0 ttl pk-yrs)     Types: Cigarettes     Start date:      Quit date:      Years since quittin.6    Smokeless tobacco: Never   Vaping Use    Vaping status: Never  Used   Substance and Sexual Activity    Alcohol use: No    Drug use: No    Sexual activity: Defer       Medications:     Current Outpatient Medications:     busPIRone (BUSPAR) 5 MG tablet, Take 1/2 tablet PO QAM and every afternoon (Patient taking differently: Take 0.5 tablets by mouth Daily. Take 1/2 tablet PO QAM and every afternoon), Disp: 90 tablet, Rfl: 0    dicyclomine (BENTYL) 10 MG capsule, 1 po 30 min before meals prn abd cramps, Disp: 90 capsule, Rfl: 2    metoprolol succinate XL (TOPROL-XL) 50 MG 24 hr tablet, Take 1 tablet by mouth Daily., Disp: 90 tablet, Rfl: 1    Synthroid 100 MCG tablet, Take 1 tablet by mouth., Disp: , Rfl:     Allergies:   Allergies   Allergen Reactions    Latex Itching, Dermatitis and Rash    Betadine [Povidone Iodine] Itching     Pt says her skin also blistered    Sulfa Antibiotics Swelling    Penicillins Rash       Objective     Physical Exam:  Vital Signs:   Vitals:    08/13/24 1309   BP: 158/98   Pulse: 103   SpO2: 98%   Weight: 54.4 kg (120 lb)     Body mass index is 21.26 kg/m².     Physical Exam  Psychiatric:         Mood and Affect: Mood is anxious.         Behavior: Behavior is agitated.         Cognition and Memory: Cognition is impaired. Memory is impaired.         Judgment: Judgment is inappropriate.     Physical Exam      Procedures    Results  Laboratory Studies  TSH slightly over five.    Imaging  MRI showed no obvious findings. CT scans were clear. Blood counts were all normal.  Assessment / Plan      Assessment/Plan:   There are no diagnoses linked to this encounter.   Assessment & Plan  1. Anxiety and mental health issues.  The MRI does not reveal any obvious findings, suggesting a combination of anxiety, panic, and worry. The memory issues could be indicative of Alzheimer's or dementia. BuSpar does not appear to be effective. Her TSH level was slightly over 5, indicating underactive thyroid. She has lost 4 pounds since 03/2024. Her blood pressure has slightly  decreased after a few minutes, but her heart rate is elevated due to anxiety. Ativan will be prescribed, to be taken up to 3 times a day, in the morning and at night. A third pill can be taken during the day if needed. She will discontinue buspirone. Lorazepam will be prescribed, starting with half a tablet tonight. She will provide feedback on her condition in the next 2 days.  There is a scheduled psychiatric appointment upcoming.        Follow Up:   No follow-ups on file.    Dmitry Nicole  [facility]     Patient or patient representative verbalized consent for the use of Ambient Listening during the visit with  Dmitry Nicole MD for chart documentation. 8/14/2024  20:22 EDT

## 2024-08-13 NOTE — TELEPHONE ENCOUNTER
From: Katie Vaughn  To: Dmitry Nicole  Sent: 8/10/2024 1:59 PM EDT  Subject: Katie Vaughn [facility]     Dr Nicole   This is Gabriel Vaughn Jr. I’m Katie’s son and wanted to ask if there is any way you could help us with referring mom to a psychiatrist. The [facility] clinic released mom from their care due to some symptoms mom has experienced in recent weeks. They referred her to a visit with you (which is Tuesday at 1pm) and to see a psychiatrist. But they called the next day and said they didn’t have any appointments in the near future for her psychiatric needs. And since they had deemed her needs as urgent they suggested we look elsewhere. But I’ve tried to contact a local office but so far I’ve had no success in getting her in to see anyone. I was hoping you could put us on a path to getting her some help. Thanks

## 2024-08-19 ENCOUNTER — OUTSIDE FACILITY SERVICE (OUTPATIENT)
Dept: FAMILY MEDICINE CLINIC | Facility: CLINIC | Age: 89
End: 2024-08-19
Payer: MEDICARE

## 2024-08-19 PROCEDURE — G0180 MD CERTIFICATION HHA PATIENT: HCPCS | Performed by: FAMILY MEDICINE

## 2024-08-27 ENCOUNTER — TELEPHONE (OUTPATIENT)
Dept: FAMILY MEDICINE CLINIC | Facility: CLINIC | Age: 89
End: 2024-08-27
Payer: MEDICARE

## 2024-08-27 NOTE — TELEPHONE ENCOUNTER
Caller: Jaleel Vaughn Jr    Relationship: Emergency Contact    Best call back number: [phone]     What was the call regarding: PATIENT WAS REFERRED TO PHYCHIATRY, PCP NEEDED TO REALESE RECORDS AND LAB RESULTS. PATIENT COULD NOT RECIEVE MEDICAITON DUE TO LACK OF RECORDS. [facility] HAS RECORDS THAT ARE NEEDED AS WELL. PLEASE SEND RECORDS AND LAB RESULTS TO [facility] OR CALL [facility].     DR. AHN AT [facility] IN [place]  PHONE: [phone]       Is it okay if the provider responds through MyChart: YES